# Patient Record
Sex: MALE | Race: WHITE | Employment: UNEMPLOYED | URBAN - NONMETROPOLITAN AREA
[De-identification: names, ages, dates, MRNs, and addresses within clinical notes are randomized per-mention and may not be internally consistent; named-entity substitution may affect disease eponyms.]

---

## 2022-02-14 ENCOUNTER — HOSPITAL ENCOUNTER (EMERGENCY)
Age: 62
Discharge: HOME | End: 2022-02-14
Attending: STUDENT IN AN ORGANIZED HEALTH CARE EDUCATION/TRAINING PROGRAM
Payer: OTHER GOVERNMENT

## 2022-02-14 VITALS
BODY MASS INDEX: 18.61 KG/M2 | OXYGEN SATURATION: 99 % | RESPIRATION RATE: 16 BRPM | WEIGHT: 145 LBS | TEMPERATURE: 97.8 F | SYSTOLIC BLOOD PRESSURE: 108 MMHG | HEART RATE: 108 BPM | HEIGHT: 74 IN | DIASTOLIC BLOOD PRESSURE: 90 MMHG

## 2022-02-14 DIAGNOSIS — Z71.1 FEARED CONDITION NOT DEMONSTRATED: Primary | ICD-10-CM

## 2022-02-14 PROCEDURE — 99285 EMERGENCY DEPT VISIT HI MDM: CPT

## 2022-02-14 RX ORDER — ASPIRIN 325 MG
325 TABLET ORAL DAILY
Status: ON HOLD | COMMUNITY
End: 2022-07-21 | Stop reason: HOSPADM

## 2022-02-14 RX ORDER — ATORVASTATIN CALCIUM 10 MG/1
10 TABLET, FILM COATED ORAL DAILY
Status: ON HOLD | COMMUNITY
End: 2022-07-21 | Stop reason: SDUPTHER

## 2022-02-14 NOTE — ED NOTES
Pt AVS reviewed, pt expressed understanding.  Pt d/c at this time     Christine Wellington RN  02/14/22 [id number]

## 2022-02-14 NOTE — ED PROVIDER NOTES
[facility] EMERGENCY DEPARTMENT      CHIEF COMPLAINT  Altered Mental Status (Pt [facility]. Pt appears A&Ox4, pt reports he has been sick with pneumonia recently. Denies any needs today. )     HISTORY OF PRESENT ILLNESS  Janis Hu is a 57 y.o. male  who presents [facility] the ED [facility] with concerns for altered mental status. Patient states that he was being seen there for a checkup after recent diagnosis of pneumonia. He is unsure why exactly he is in here. He denies any current complaints or concerns. Denies cough, chest pain or shortness of breath. Denies fevers. He denies any other complaints or concerns. No other complaints, modifying factors or associated symptoms. I have reviewed the following from the nursing documentation. Past Medical History:   Diagnosis Date    CVA (cerebral vascular accident) [facility])      History reviewed. No pertinent surgical history. History reviewed. No pertinent family history. Social History     Socioeconomic History    Marital status: Not on file     Spouse name: Not on file    Number of children: Not on file    Years of education: Not on file    Highest education level: Not on file   Occupational History    Not on file   Tobacco Use    Smoking status: Former Smoker     Packs/day: 1.00     Years: 54.00     Pack years: 54.00    Smokeless tobacco: Current User     Types: Chew, Snuff   Substance and Sexual Activity    Alcohol use:  Yes     Alcohol/week: 14.0 standard drinks     Types: 14 Cans of beer per week     Comment: 2 twisted teas a day    Drug use: Never    Sexual activity: Not on file   Other Topics Concern    Not on file   Social History Narrative    Not on file     Social Determinants of Health     Financial Resource Strain:     Difficulty of Paying Living Expenses: Not on file   Food Insecurity:     Worried About Running Out of Food in the Last Year: Not on file    Dennise of Food in the Last Year: Not on file Transportation Needs:     Lack of Transportation (Medical): Not on file    Lack of Transportation (Non-Medical): Not on file   Physical Activity:     Days of Exercise per Week: Not on file    Minutes of Exercise per Session: Not on file   Stress:     Feeling of Stress : Not on file   Social Connections:     Frequency of Communication with Friends and Family: Not on file    Frequency of Social Gatherings with Friends and Family: Not on file    Attends Christian Services: Not on file    Active Member of [address] [place] [address] [employer] or Organizations: Not on file    Attends Club or Organization Meetings: Not on file    Marital Status: Not on file   Intimate Partner Violence:     Fear of Current or Ex-Partner: Not on file    Emotionally Abused: Not on file    Physically Abused: Not on file    Sexually Abused: Not on file   Housing Stability:     Unable to Pay for Housing in the Last Year: Not on file    Number of [place] in the Last Year: Not on file    Unstable Housing in the Last Year: Not on file     No current facility-administered medications for this encounter. Current Outpatient Medications   Medication Sig Dispense Refill    aspirin 325 MG tablet Take 325 mg by mouth daily      atorvastatin (LIPITOR) 10 MG tablet Take 10 mg by mouth daily       No Known Allergies    REVIEW OF SYSTEMS  10 systems reviewed, pertinent positives per HPI otherwise noted to be negative. PHYSICAL EXAM  BP (!) 108/90   Pulse 108   Resp 16   Ht 6' 2\" (1.88 m)   Wt 145 lb (65.8 kg)   SpO2 99%   BMI 18.62 kg/m²    GENERAL APPEARANCE: Awake and alert. Cooperative. No acute distress. HENT: Normocephalic. Atraumatic. NECK: Supple. EYES: PERRL. EOM's grossly intact. HEART/CHEST: RRR. No murmurs. LUNGS: Respirations unlabored. CTAB. Good air exchange. Speaking comfortably in full sentences. ABDOMEN: No tenderness. Soft. Non-distended. No masses. No organomegaly. No guarding or rebound.    MUSCULOSKELETAL: No extremity edema. Compartments soft. No deformity. No tenderness in the extremities. All extremities neurovascularly intact. SKIN: Warm and dry. No acute rashes. NEUROLOGICAL: Alert and oriented x 4. CN's 2-12 intact. No gross facial drooping. Strength 5/5, sensation intact. Gait normal.  PSYCHIATRIC: Normal mood and affect. LABS  I have reviewed all labs for this visit. No results found for this visit on 02/14/22. RADIOLOGY  No orders to display     ED COURSE/MDM  Patient seen and evaluated. Old records reviewed. Labs and imaging reviewed and results discussed with patient. Patient is a 69-year-old male, who was sent from the [place] with concerns for possible altered mental status. Full HPI detailed above. Upon arrival in the ED, vitals remarkable for mild tachycardia, otherwise reassuring. Patient is resting comfortably and is in no acute distress. His neurologic exam is normal.  He is completely alert and oriented, can tell me today's date and who the president is. He states that he was sent here from the [place] to \"get checked out\" but he reports that they did not tell him anything more than that. He denies any chest pain, cough, shortness of breath, dysuria hematuria, or any other complaints or concerns aside from being hungry. Did attempt to obtain at least a urine here in the department however patient is requesting to leave and at this time he is alert and oriented and I do not feel that he is holdable. He does remain mildly tachycardic, heart rate was checked after he started walking out of the department I believe is secondary to him ambulating. He is otherwise afebrile and appears nontoxic. Is not altered at this point in time as stated above do not feel that I can hold him given the fact that he is fully alert and oriented and does have a ride home. He is given return precautions for the ED. He is comfortable and agreed with plan of care and was discharged.     During the patient's ED course, the patient was given:  Medications - No data to display     CLINICAL IMPRESSION  1. Feared condition not demonstrated        Blood pressure (!) 108/90, pulse 108, resp. rate 16, height 6' 2\" (1.88 m), weight 145 lb (65.8 kg), SpO2 99 %. DISPOSITION  Irbalta Arreola was discharged to home in good condition. Patient was given scripts for the following medications. I counseled patient how to take these medications. New Prescriptions    No medications on file       Follow-up with:  [facility]) Pre-Services  [phone]  Schedule an appointment as soon as possible for a visit   As needed    [id number] [facility] Emergency Department  [address] [place] [address],[address]  [phone]  Go to   If symptoms worsen      DISCLAIMER: This chart was created using Dragon dictation software. Efforts were made by me to ensure accuracy, however some errors may be present due to limitations of this technology and occasionally words are not transcribed correctly.        Abad Hernandez MD  02/14/22 [id number]

## 2022-06-29 ENCOUNTER — HOSPITAL ENCOUNTER (INPATIENT)
Age: 62
LOS: 23 days | Discharge: HOME HEALTH | DRG: 057 | End: 2022-07-22
Attending: STUDENT IN AN ORGANIZED HEALTH CARE EDUCATION/TRAINING PROGRAM | Admitting: STUDENT IN AN ORGANIZED HEALTH CARE EDUCATION/TRAINING PROGRAM
Payer: OTHER GOVERNMENT

## 2022-06-29 PROBLEM — I63.9 ACUTE CVA (CEREBROVASCULAR ACCIDENT) (HCC): Status: ACTIVE | Noted: 2022-06-29

## 2022-06-29 PROCEDURE — 1280000000 HC REHAB R&B

## 2022-06-29 PROCEDURE — 6370000000 HC RX 637 (ALT 250 FOR IP): Performed by: STUDENT IN AN ORGANIZED HEALTH CARE EDUCATION/TRAINING PROGRAM

## 2022-06-29 RX ORDER — TRAZODONE HYDROCHLORIDE 50 MG/1
50 TABLET ORAL NIGHTLY PRN
Status: DISCONTINUED | OUTPATIENT
Start: 2022-06-29 | End: 2022-07-22 | Stop reason: HOSPADM

## 2022-06-29 RX ORDER — ENOXAPARIN SODIUM 100 MG/ML
40 INJECTION SUBCUTANEOUS DAILY
Status: DISCONTINUED | OUTPATIENT
Start: 2022-06-30 | End: 2022-07-22 | Stop reason: HOSPADM

## 2022-06-29 RX ORDER — NICOTINE 21 MG/24HR
1 PATCH, TRANSDERMAL 24 HOURS TRANSDERMAL DAILY
Status: DISCONTINUED | OUTPATIENT
Start: 2022-06-29 | End: 2022-07-22 | Stop reason: HOSPADM

## 2022-06-29 RX ORDER — ACETAMINOPHEN 325 MG/1
650 TABLET ORAL EVERY 4 HOURS PRN
Status: DISCONTINUED | OUTPATIENT
Start: 2022-06-29 | End: 2022-07-22 | Stop reason: HOSPADM

## 2022-06-29 RX ORDER — ATORVASTATIN CALCIUM 10 MG/1
10 TABLET, FILM COATED ORAL NIGHTLY
Status: DISCONTINUED | OUTPATIENT
Start: 2022-06-29 | End: 2022-07-22 | Stop reason: HOSPADM

## 2022-06-29 RX ORDER — ASPIRIN 81 MG/1
81 TABLET, CHEWABLE ORAL DAILY
Status: DISCONTINUED | OUTPATIENT
Start: 2022-06-30 | End: 2022-07-22 | Stop reason: HOSPADM

## 2022-06-29 RX ORDER — POLYETHYLENE GLYCOL 3350 17 G/17G
17 POWDER, FOR SOLUTION ORAL DAILY PRN
Status: DISCONTINUED | OUTPATIENT
Start: 2022-06-29 | End: 2022-07-22 | Stop reason: HOSPADM

## 2022-06-29 RX ORDER — ACETAMINOPHEN 325 MG/1
650 TABLET ORAL EVERY 4 HOURS PRN
Status: DISCONTINUED | OUTPATIENT
Start: 2022-06-29 | End: 2022-06-29 | Stop reason: SDUPTHER

## 2022-06-29 RX ADMIN — ATORVASTATIN CALCIUM 10 MG: 10 TABLET, FILM COATED ORAL at 21:12

## 2022-06-29 ASSESSMENT — PAIN SCALES - GENERAL
PAINLEVEL_OUTOF10: 0

## 2022-06-29 NOTE — PROGRESS NOTES
NURSING ASSESSMENT:  [address]    [facility]     Rehab Dx/Hx: Acute CVA (cerebrovascular accident) [facility]) [I63.9]   :1960  QDY:[id number]  Date of Admit: 2022  Room #: 0170/0170-01    Subjective:   Patient admitted to [email or web address] from  via stretcher  Alert and oriented x4. Oriented to room and call light system. Oriented to rehab routine and therapy schedules. Informed about care conferences and ordering of meals with PCA. Drug / Medication Review:   Medications were reviewed by RN at time of admission  [x]  No potential or actual clinically significant medication issues were noted. []  Potential or actual clinically significant medication issues were found and MD was notified. (Specified below if applicable)   []  Allergy to medication   []  Drug interactions (drug/drug, drug/food, drug/disease interactions)   []  Duplicate drug   []  Omission (drug missing from prescribed regimen)   []  Non adherence   []  Adverse reaction   []  Wrong patient, drug, dose, route, time error   []  Ineffective drug therapy    Patient Mood Interview    Symptom Presence                 Symptom Frequency  0. No (enter 0 in column 2)                 0. Never or 1 day  1. Yes (enter 0-3 in column 2)     1. 2-6 days (several days)  9. No response (leave column 2 blank    2. 7-11 days (half or more days)                                 3. 12-14 days (nearly everyday 1. Symptom Presence 2. Symptom Frequency               Enter scores in boxes     A. Little interest or pleasure in doing things   0 0   B. Feeling down, depressed, or hopeless   0 0   If either A or B is coded 2 or 3, CONTINUE asking the questions below. If not, END the interview. Serafin Newman falling or staying asleep, or sleeping too much       D. Feeling tired or having little energy       E. Poor appetite or overeating       F.  Feelign bad about yourself - or that you are a failure or have let yourself or your family down     G. Trouble concentrating on things, such as reading the newspaper or watching television     H. Moving or speaking so slowly that other people could have noticed. Or the opposite- being so fidgety or restless that you have been moving around a lot more than usual.      I. Thoughts that you would be better off dead, or of hurting yourself in some way. Total Severity: Add scores for all frequency responses in column 2       Social isolation (from [place])     How often do you feel lonely or isolated from those around you? [x] 0. Never  [] 1. Rarely  [] 2. Sometimes  [] 3. Often  [] 4. Always  [] 8. Patient unable to respond. 4 Eyes Skin Assessment   The patient is being assessed for: Admission     I agree that 2 RN's have performed a thorough Head to Toe Skin Assessment on the patient. ALL assessment sites listed below have been assessed. Areas assessed by both nurses:   [x]   Head, Face, and Ears   [x]   Shoulders, Back, and Chest, Abdomen  [x]   Arms, Elbows, and Hands   [x]   Coccyx, Sacrum, and Ischium  [x]   Legs, Feet, and Heel     Does the Patient have Skin Breakdown?   No         Tom Prevention initiated:  Yes   Wound Care Orders initiated:   Not Applicable      [facility] nurse consulted for Pressure Injury (Stage 3,4, Unstageable, DTI, NWPT, Complex wounds)and New or Established Ostomies:   Not Applicable    Primary Nurse eSignature: Killian Pisano  Co-signer eSignature:  Catherine Fang RN

## 2022-06-29 NOTE — LETTER
[address] [address]  Phone: [phone]             July 5, 2022    Patient:    Date of Birth:    Date of Visit: 6/29/2022       To Whom It May Concern:    Bernadette Quintero has an immediate family member with a debilitating condition in our facility beginning 6/29/2022 and is still admitted at this time. .      Sincerely,       Rob Isabel RN         Signature:__________________________________

## 2022-06-29 NOTE — PROGRESS NOTES
ARU PATIENT TREATMENT PLAN  [facility], [address]   [phone]    Indioyvonne Arminda    : 1960  Acct #: [de-identified]  MRN: [id number]   PHYSICIAN:  Catarino Marie MD  Primary Problem    Patient Active Problem List   Diagnosis    Acute CVA (cerebrovascular accident) ([address]    Moderate malnutrition ([facility]       Rehabilitation Diagnosis:     Acute CVA (cerebrovascular accident) ([facility] [I63.9]       ADMIT DATE:2022    Patient Goals:\"to get back to normal\"    Admitting Impairments: Pt. Admitted s/p CVA resulting in Decreased functional mobility ; Decreased endurance;Decreased coordination;Decreased posture;Decreased ADL status; Decreased sensation;Decreased balance;Decreased strength;Decreased safe awareness;Decreased high-level IADLs;Decreased cognition;Decreased fine motor control  Barriers: endurance, weakness  Participation: Good     CARE PLAN     NURSING:  [place] while on this unit will:     [x] Be continent of bowel and bladder     [x] Have an adequate number of bowel movements  [] Urinate with no urinary retention >300ml in bladder  [] Complete bladder protocol with george removal  [] Maintain O2 SATs at ___%  [] Have pain managed while on ARU       [] Be pain free by discharge   [x] Have no skin breakdown while on ARU  [] Have improved skin integrity via wound measurements  [] Have no signs/symptoms of infection at the wound site  [x] Be free from injury during hospitalization   [] Complete education with patient/family with understanding demonstrated for:  [] Adjustment   [x] Other:   Nursing interventions may include bowel/bladder training, education for medical assistive devices, medication education, O2 saturation management, energy conservation, stress management techniques, fall prevention, alarms protocol, seating and positioning, skin/wound care, pressure relief instruction,dressing changes,  infection protection, DVT prophylaxis, and/or assistance with in room safety with transfers to bed, toilet, wheelchair, shower as well as bathroom activities and hygiene. Patient/caregiver education for:   [x] Disease/sustained injury/management      [x] Medication Use   [] Surgical intervention   [x] Safety   [x] Body mechanics and or joint protection   [x] Health maintenance         PHYSICAL THERAPY:  Goals:                  Short Term Goals  Time Frame for Short term goals: 10 days (7/9/22)  Short term goal 1: Pt will perform bed mobility with min A  Short term goal 2: Pt will perform transfers with min A  Short term goal 3: Pt will ambulate 22' with quad cane and mod A  Short term goal 4: Pt will negotiate 4 steps with HR and mod A            Long Term Goals  Time Frame for Long term goals : 21 days (7/20/22)  Long term goal 1: Pt will perform bed mobility mod I  Long term goal 2: Pt will perform transfers with supervision  Long term goal 3: Pt will ambulate 48' with quad cane and CGA  Long term goal 4: Pt will propel wheelchair 150' mod I  Long term goal 5: Pt will negotiate 4 steps with HR and min A  These goals were reviewed with this patient at the time of assessment and Megan Mena is in agreement.      Plan of Care: Pt to be seen 5 out of 7 days per week, 60 mins (exact) per day for 21 days (exact)                   Current Treatment Recommendations: Strengthening,ROM,Balance training,Functional mobility training,Transfer training,Endurance training,Wheelchair mobility training,Gait training,Stair training,Neuromuscular re-education,Manual Therapy - Soft Tissue Mobilization,Patient/Caregiver education & training,Safety education & training,Home exercise program,Equipment evaluation, education, & procurement,Modalities,Therapeutic activities,ADL/Self-care training,IADL training      OCCUPATIONAL THERAPY:  Goals:             Short Term Goals  Time Frame for Short term goals: within 7 days by 7/6/22  Short Term Goal 1: Pt will perform toilet transfer with Mod A and LRAD  Short Term Goal 2: Pt will perform LB dressing with Min A and AE PRN  Short Term Goal 3: Pt will perform UB dressing with Min A and appropriate katlyn techniques  Short Term Goal 4: Pt will perform functional dynamic balance activity in stance for >5 minutes with Min A and LRAD  Short Term Goal 5: Pt will perform 2-3 grooming tasks with Min A for during tasks standing vs sitting with LRAD and Min A for balance :  Long Term Goals  Time Frame for Long term goals : within 21 days by  Long Term Goal 1: Pt will perform TB dressing Mod I  Long Term Goal 2: Pt will perfrom TB bathing Mod I and AE PRN  Long Term Goal 3: Pt will perform simple IADL task in stance with LRAD for >7 minutes with supervision  Long Term Goal 4: Pt will self feed with R hand with Min A  Long Term Goal 5: Add goal after objective R vs L hand assessment completed with  strength, box and blocks, etc. :    These goals were reviewed with this patient at the time of assessment and Rosario Medina is in agreement    Plan of Care:  Pt to be seen 5 out of 7 days per week, 60   mins (exact) per day for 21 days (exact)  Current Treatment Recommendations: Strengthening,Balance training,Functional mobility training,Endurance training,Neuromuscular re-education,Positioning,Equipment evaluation, education, & procurement,Modalities,Patient/Caregiver education & training,Safety education & training,Self-Care / ADL,Home management training,Cognitive/Perceptual training,Coordination training,Sensory integraion      SPEECH THERAPY:   Goals:    Speech/lang/cog goals:             Short-term Goals  Timeframe for Short-term Goals: 14 days (7/13/22)  Goal 4: The pt will complete laryngeal/pharyngeal strengthening exercises in 10/10 trials, min cues. Short-term Goals  Timeframe for Short-term Goals: 14 days (7/13/22)  Goal 1: The pt will complete graded recall tasks with 90% accuracy, no cues.   Goal 2: The pt will complete executive compensatory speech strategies during structured speech tasks with 70% accuracy, mod cues. Timeframe for Short-term Goals: 14 days (7/13/22)  These goals were reviewed with this patient at the time of assessment and Kaur Vasquez is in agreement    Plan of Care: Pt to be seen 5 out of 7 days per week, 60 mins (exact) per day for 21 days (exact)             Dysphagia: HOB 90* and 30\" after meals; small bites/sips; alternate solids/liquids every 3-5 bites; oral care after every meal           Speech/Language/Cognition: Compensatory strategy training and carryover, recall/STM, problem solving, reasoning, exec function, thought organization, attention. CASE MANAGEMENT:  Goals:   Assist patient/family with discharge planning, patient/family counseling,   and coordination with insurance during ARU stay.     QIM / IRF MATTHEW SCORES:  ITEM CURRENT SCORE GOAL   Eating CARE Score: 3 Discharge Goal: Independent   Oral Hygiene CARE Score: 1 Discharge Goal: Independent   Toileting Hygiene CARE Score: 2 Discharge Goal: Independent   Shower/Bathe Self CARE Score: 1 Discharge Goal: Independent   Upper Body Dressing CARE Score: 2 Discharge Goal: Independent   Lower Body Dressing CARE Score: 2 Discharge Goal: Independent   On/Off Footwear CARE Score: 2 Discharge Goal: Independent   Roll Left & Right CARE Score: 3 Discharge Goal: Independent   Sit to Lying  CARE Score: 3 Discharge Goal: Independent   Lying to Sitting EOB CARE Score: 2 Discharge Goal: Independent   Sit to Stand CARE Score: 3 Discharge Goal: Supervision or touching assistance   Chair/Bed to Chair Transfer CARE Score: 3 Discharge Goal: Supervision or touching assistance   Toilet Transfer CARE Score: 2 Discharge Goal: Independent   Car Transfer CARE Score: 1 Discharge Goal: Supervision or touching assistance   Walk 10 Feet CARE Score: 88 Discharge Goal: Supervision or touching assistance   Walk 50 Feet, 2 Turns CARE Score: 88 Discharge Goal: Supervision or touching assistance   Walk 150 Feet CARE Score: 88 Discharge Goal: Supervision or touching assistance   Walk 10 Feet, Uneven Surface CARE Score: 88 Discharge Goal: Supervision or touching assistance   1 Step (Curb) CARE Score: 88 Discharge Goal: Supervision or touching assistance   4 Steps CARE Score: 88 Discharge Goal: Supervision or touching assistance   12 Steps CARE Score: 88 Discharge Goal: Supervision or touching assistance    Object CARE Score: 88 Discharge Goal: Supervision or touching assistance   Wheel 50 feet, 2 turns       Wheel 150 Feet              Rene [place] will be seen a minimum of 3 hours of therapy per day, a minimum of 5 out of 7 days per week. [] In this rare instance due to the nature of this patient's medical involvement, this patient will be seen 15 hours per week (900 minutes within a 7 day period). Treatments may include therapeutic exercises, gait training, neuromuscular re-ed, transfer training, community reintegration, bed mobility, w/c mobility and training, self care, home mgmt, cognitive training, energy conservation,dysphagia tx, speech/language/communication therapy, group therapy, and patient/family education. In addition, dietician/nutritionist may monitor calorie count as well as intake and collaboratively work with SLP on dietary upgrades. Neuropsychology/Psychology may evaluate and provide necessary support.     Medical issues being managed closely and that require 24 hour availability of a physician:   [x] Swallowing Precautions  [x] Bowel/Bladder Fx  [] Weight bearing precautions   [] Wound Care    [] Pain Mgmt   [x] Infection Protection   [x] DVT Prophylaxis   [x] Fall Precautions  [x] Fluid/Electrolyte/Nutrition Balance   [x] Voice Protection   [] Respiratory  [] Other:    Medical Prognosis: [x] Good  [] Fair    [] Guarded   Total expected IRF days 21  Anticipated discharge destination:    [] Home Independently   [] Home Modified Independent

## 2022-06-30 PROBLEM — E44.0 MODERATE MALNUTRITION (HCC): Status: ACTIVE | Noted: 2022-06-30

## 2022-06-30 LAB
ANION GAP SERPL CALCULATED.3IONS-SCNC: 8 MMOL/L (ref 3–16)
BASOPHILS ABSOLUTE: 0.1 K/UL (ref 0–0.2)
BASOPHILS RELATIVE PERCENT: 1.1 %
BUN BLDV-MCNC: 13 MG/DL (ref 7–20)
CALCIUM SERPL-MCNC: 9.5 MG/DL (ref 8.3–10.6)
CHLORIDE BLD-SCNC: 100 MMOL/L (ref 99–110)
CO2: 27 MMOL/L (ref 21–32)
CREAT SERPL-MCNC: 0.8 MG/DL (ref 0.8–1.3)
EOSINOPHILS ABSOLUTE: 0.1 K/UL (ref 0–0.6)
EOSINOPHILS RELATIVE PERCENT: 1.3 %
GFR AFRICAN AMERICAN: >60
GFR NON-AFRICAN AMERICAN: >60
GLUCOSE BLD-MCNC: 111 MG/DL (ref 70–99)
HCT VFR BLD CALC: 43.9 % (ref 40.5–52.5)
HEMATOLOGY PATH CONSULT: NORMAL
HEMATOLOGY PATH CONSULT: YES
HEMOGLOBIN: 14.9 G/DL (ref 13.5–17.5)
LYMPHOCYTES ABSOLUTE: 1.2 K/UL (ref 1–5.1)
LYMPHOCYTES RELATIVE PERCENT: 22.3 %
MCH RBC QN AUTO: 37.7 PG (ref 26–34)
MCHC RBC AUTO-ENTMCNC: 33.8 G/DL (ref 31–36)
MCV RBC AUTO: 111.3 FL (ref 80–100)
MONOCYTES ABSOLUTE: 0.5 K/UL (ref 0–1.3)
MONOCYTES RELATIVE PERCENT: 9.5 %
NEUTROPHILS ABSOLUTE: 3.4 K/UL (ref 1.7–7.7)
NEUTROPHILS RELATIVE PERCENT: 65.8 %
PDW BLD-RTO: 13.7 % (ref 12.4–15.4)
PLATELET # BLD: 173 K/UL (ref 135–450)
PMV BLD AUTO: 7.6 FL (ref 5–10.5)
POTASSIUM REFLEX MAGNESIUM: 4 MMOL/L (ref 3.5–5.1)
RBC # BLD: 3.95 M/UL (ref 4.2–5.9)
SLIDE REVIEW: ABNORMAL
SODIUM BLD-SCNC: 135 MMOL/L (ref 136–145)
WBC # BLD: 5.2 K/UL (ref 4–11)

## 2022-06-30 PROCEDURE — 97116 GAIT TRAINING THERAPY: CPT

## 2022-06-30 PROCEDURE — 97167 OT EVAL HIGH COMPLEX 60 MIN: CPT

## 2022-06-30 PROCEDURE — 97530 THERAPEUTIC ACTIVITIES: CPT

## 2022-06-30 PROCEDURE — 92610 EVALUATE SWALLOWING FUNCTION: CPT

## 2022-06-30 PROCEDURE — 97162 PT EVAL MOD COMPLEX 30 MIN: CPT

## 2022-06-30 PROCEDURE — 80048 BASIC METABOLIC PNL TOTAL CA: CPT

## 2022-06-30 PROCEDURE — 85025 COMPLETE CBC W/AUTO DIFF WBC: CPT

## 2022-06-30 PROCEDURE — 92523 SPEECH SOUND LANG COMPREHEN: CPT

## 2022-06-30 PROCEDURE — 97112 NEUROMUSCULAR REEDUCATION: CPT

## 2022-06-30 PROCEDURE — 1280000000 HC REHAB R&B

## 2022-06-30 PROCEDURE — 6370000000 HC RX 637 (ALT 250 FOR IP): Performed by: STUDENT IN AN ORGANIZED HEALTH CARE EDUCATION/TRAINING PROGRAM

## 2022-06-30 PROCEDURE — 97535 SELF CARE MNGMENT TRAINING: CPT

## 2022-06-30 PROCEDURE — 6360000002 HC RX W HCPCS: Performed by: STUDENT IN AN ORGANIZED HEALTH CARE EDUCATION/TRAINING PROGRAM

## 2022-06-30 RX ADMIN — ATORVASTATIN CALCIUM 10 MG: 10 TABLET, FILM COATED ORAL at 21:00

## 2022-06-30 RX ADMIN — ENOXAPARIN SODIUM 40 MG: 100 INJECTION SUBCUTANEOUS at 08:14

## 2022-06-30 RX ADMIN — TRAZODONE HYDROCHLORIDE 50 MG: 50 TABLET ORAL at 21:00

## 2022-06-30 RX ADMIN — ASPIRIN 81 MG: 81 TABLET, CHEWABLE ORAL at 08:15

## 2022-06-30 ASSESSMENT — PAIN SCALES - GENERAL
PAINLEVEL_OUTOF10: 0
PAINLEVEL_OUTOF10: 1
PAINLEVEL_OUTOF10: 0

## 2022-06-30 NOTE — PROGRESS NOTES
Physical Therapy  Facility/Department: [facility]  Rehabilitation Physical Therapy Initial Assessment    NAME: Mikie Mcduffie  : 1960 (63 y.o.)  MRN: [id number]  CODE STATUS: Full Code    Date of Service: 22      Past Medical History:   Diagnosis Date    CVA (cerebral vascular accident) ([address]      No past surgical history on file. Chart Reviewed: Yes  Patient assessed for rehabilitation services?: Yes  Family / Caregiver Present: No  Referring Practitioner: Luigi Vasquez MD  Referral Date : 22  Diagnosis: acute CVA  Other (Comment): mildly impulsive  General Comment  Comments: Pt supine in bed upon arrival.    Restrictions:  Restrictions/Precautions: Fall Risk  Position Activity Restriction  Other position/activity restrictions: R sided weakness     SUBJECTIVE  Subjective: Pt agreeable to therapy. Eager to get up and walk. Denies pain.     Prior Level of Function:  Social/Functional History  Lives With: Family (brother)  Type of Home: House  Home Layout: Two level,Able to Live on Main level with bedroom/bathroom  Home Access: Level entry (through rear)  Bathroom Shower/Tub: Tub/Shower unit  Bathroom Toilet: Standard  Home Equipment: Cane,Walker, rolling,Wheelchair-manual  Has the patient had two or more falls in the past year or any fall with injury in the past year?: Yes (pt reports more than 5 falls in the last year)  ADL Assistance: Independent  Homemaking Assistance: Independent  Homemaking Responsibilities: Yes  Meal Prep Responsibility: Primary  Laundry Responsibility: Primary  Cleaning Responsibility: Primary  Shopping Responsibility: Primary  Ambulation Assistance: Independent  Transfer Assistance: Independent  Active : Yes  Occupation: Retired  Type of Occupation: adrian     OBJECTIVE  Vision  Vision: Impaired  Vision Exceptions: Wears glasses for reading    Cognition  Overall Cognitive Status: Exceptions  Arousal/Alertness: Appropriate responses to stimuli  Following Commands: Follows one step commands with repetition; Follows one step commands consistently  Attention Span: Attends with cues to redirect  Safety Judgement: Decreased awareness of need for assistance;Decreased awareness of need for safety  Insights: Decreased awareness of deficits  Initiation: Requires cues for some  Sequencing: Requires cues for some    ROM  PROM RLE (degrees)  RLE PROM: WFL  AROM LLE (degrees)  LLE AROM : WFL    Strength  Strength RLE  Strength RLE: Exception  Comment: R hip flex 2/5, hip abd 2/5, knee ext 2/5, ankle PF/DF 3/5  Strength LLE  Strength LLE: WFL    Sensation  Overall Sensation Status: Impaired  Additional Comments: reports numbness on R side of body including UE and LE    Functional Mobility  Bed mobility  Rolling to Left: Minimal assistance  Rolling to Right: Moderate assistance  Supine to Sit: Maximum assistance  Sit to Supine: Minimal assistance  Scooting: Minimal assistance  Transfers  Sit to Stand: Moderate Assistance  Stand to sit: Moderate Assistance  Bed to Chair: Moderate assistance (to L with cues for safety)  Stand Pivot Transfers:  Moderate Assistance  Car Transfer: Dependent/Total (attempted car transfer, however pt unable to clear bottom off wheelchair to transfer into car)  Comment: sit-stand from EOB and wheelchair with mod A and cues for hand placement; pt able to stand from w/c to katlyn walker with mod A; sit-stand from w/c to parallel bars with min A  Balance  Sitting - Static: Fair  Sitting - Dynamic: Fair  Standing - Static: Poor;+  Standing - Dynamic: Poor  Comments: unsafe to attempt picking up object from floor    Environmental Mobility  Ambulation  Surface: level tile  Device: Parallel Bars  Assistance: Minimal assistance  Quality of Gait: step to gait pattern, decresed foot clearance on R with foot dragging ~25% of the time, improved with verbal cues; unsteady at times; highly dependent on use of parallel bar for support  Distance: 10' and 20'  Comments: unsafe to attempt ambulation on uneven surface  More Ambulation?: No  Stairs/Curb  Stairs?: No (unsafe to attempt at this time)  Wheelchair Activities  Wheelchair Size: 20\"  Wheelchair Type: Standard  Wheelchair Cushion: None  Level of Assistance for pressure relief activities: Stand by assistance  Wheelchair Parts Management: Yes  Left Brakes Level of Assistance: Dependent/Total  Right Brakes Level of Assistance: Dependent/Total  Propulsion: No       ASSESSMENT  Vitals  Heart Rate: 75  BP: 118/79  MAP (Calculated): 92  SpO2: 96 %    Activity Tolerance  Activity Tolerance: Patient tolerated evaluation without incident    Assessment  Assessment: Pt is a 59 y/o male who presents to rehab with acute CVA. Pt was independent prior to admit living with brother in 2 story house. Pt currently requires max A for bed mobility and mod A for transfers. Pt also demonstrates decreased safety awareness and some insight into current deficits. Pt would benefit from continued skilled PT to address current limtiations. Treatment Diagnosis: impaired functional mobility  Therapy Prognosis: Good  Decision Making: Medium Complexity  Discharge Recommendations: 24 hour supervision or assist;Patient would benefit from continued therapy after discharge  PT Equipment Recommendations  Equipment Needed:  (TBD pending progress)    CLINICAL IMPRESSION   Pt is a 59 y/o male who presents to rehab with acute CVA. Pt was independent prior to admit living with brother in 2 story house. Pt currently requires max A for bed mobility and mod A for transfers. Pt also demonstrates decreased safety awareness and some insight into current deficits. Pt would benefit from continued skilled PT to address current limtiations.     GOALS  Patient Goals   Patient goals : \"to be able to walk again and use my R arm\"  Short Term Goals  Time Frame for Short term goals: 10 days (7/9/22)  Short term goal 1: Pt will perform bed mobility with min A  Short term goal 2: Pt will perform transfers with min A  Short term goal 3: Pt will ambulate 22' with quad cane and mod A  Short term goal 4: Pt will negotiate 4 steps with HR and mod A  Long Term Goals  Time Frame for Long term goals : 21 days (7/20/22)  Long term goal 1: Pt will perform bed mobility mod I  Long term goal 2: Pt will perform transfers with supervision  Long term goal 3: Pt will ambulate 48' with quad cane and CGA  Long term goal 4: Pt will propel wheelchair 150' mod I  Long term goal 5: Pt will negotiate 4 steps with HR and min A    PLAN OF CARE  Frequency: 1-2 treatment sessions per day, 5-7 days per week  Plan  Plan:  minutes of therapy at least 5 out of 7 days a week  Plan weeks: 21 days  Current Treatment Recommendations: Strengthening;ROM;Balance training;Functional mobility training;Transfer training; Endurance training; Wheelchair mobility training;Gait training;Stair training;Neuromuscular re-education;Manual Therapy - Soft Tissue Mobilization;Patient/Caregiver education & training; Safety education & training;Home exercise program;Equipment evaluation, education, & procurement; Modalities; Therapeutic activities  Safety Devices  Type of Devices: All darrel prominences offloaded; All fall risk precautions in place;Call light within reach; Chair alarm in place;Gait belt;Patient at risk for falls; Left in chair;Nurse notified    EDUCATION  Education  Education Given To: Patient  Education Provided: Role of Therapy; Safety;Equipment; Mobility Training; Fall Prevention Strategies;Transfer Training;Plan of Care  Education Provided Comments: Educated on safety with transfers and positioning of R leg  Education Method: Demonstration;Verbal  Barriers to Learning: Cognition  Education Outcome: Verbalized understanding;Continued education needed    ELOS:   Plan weeks: 21 days    Therapy Time   Individual Concurrent Group Co-treatment   Time In 0900         Time Out 1000         Minutes 60           Timed Code Treatment Minutes: [address] [facility], [address] [place] [address], 06/30/22 at 10:29 AM

## 2022-06-30 NOTE — PROGRESS NOTES
Speech Language Pathology  Facility/Department: [facility]  Initial Speech/Language/Cognitive Assessment    NAME: Le Hilliard  : 1960   MRN: [id number]  ADMISSION DATE: 2022  ADMITTING DIAGNOSIS: has Acute CVA (cerebrovascular accident) [facility]) on their problem list.  DATE ONSET: Pt admitted to [address] [place] [address] on 22    Date of Eval: 2022   Evaluating Therapist: GEN Trevizo    RECENT RESULTS MRI Head WO Contrast (22): IMPRESSION:   1.  Acute infarct involving the left corona radiata and posterior left putamen. No evidence of hemorrhagic transformation or significant mass effect. 2.  Small focal subacute infarct in the right parietal.   3.  Multiple additional scattered remote-appearing infarcts within the bilateral MCA and left PCA distributions. 4.  Moderate chronic small vessel ischemic disease with diffuse cerebral and cerebellar volume loss. Primary Complaint: Pt s/p L MCA CVA, transferred to [address] [place] [address] from [facility]. Pain:  Pain Assessment  Pain Assessment: 0-10  Pain Level: 0    Assessment:  Cognitive Diagnosis: Mild cognitive deficit / reduced insight into deficits, poor safety awareness  Speech Diagnosis: Moderate-severe dysarthria   Pt pleasant and cooperative throughout evaluation, seen upright in recliner. Pt presents with right-sided oral/facial weakness and numbness per pt, moderate-severe dysarthria with pt's responses >1-2 words. When discussing pt's speech, he stated \"well I'm a [place] boy\", but later on did state that his speech has changed/is worse than baseline. He also has multiple teeth missing. The pt reported that he independently managed all of his own household tasks PTA, shared some duties with his brother (I.e. yard work). The pt was administered the [address] ([address] [facility] [facility]) Examination this date to assess cognition.   The pt scored a 26/28 with a score of 25 or greater considered WNL per the SLUMS (*pt has less than a high school education per pt). The pt's score was also adjusted from 30 to 28 d/t drawing aspect for clock omitted (pt unable to write/utilize dominant hand). See pt's scores on each subtest below:    [address] Status (SLUMS) Examination   Section / subtest   Score Comments   Orientation   3/3    Short-term recall   3/5    Calculations   3/3    Naming   3/3    Attention: number repetition   2/2    Visuospatial tasks: Clock drawing and shape identification   4/4 Drawing aspect of clock omitted (*pt unable to write/use dominant hand); target time completed verbally with pt     Auditory comprehension 8/8     Total score:  26/28  Pt has less than a high school education       The pt demonstrated difficulty with short-term recall only. He independently recalled 3/5 words after <5 minute delay. The pt's strengths included orientation, auditory comprehension, abstract language, and visuo-spatial tasks. Per PT/OT, pt demonstrated reduced insight into severity of deficits and was impulsive at times during their evaluations. Pt would benefit from continued ST during acute stay to address high-level cognition/safety awareness, as well as, pt's significant dysarthria. Recommendations:  Requires SLP Intervention: Yes  Duration of Treatment: 5x/week, 21 days    Plan:   Goals:  Short-term Goals  Timeframe for Short-term Goals: 14 days (7/13/22)  Goal 1: The pt will complete graded recall tasks with 90% accuracy, no cues. Goal 2: The pt will complete executive function tasks (i.e. planning, deductive reasoning, inferential, functional organization tasks) with 90% accuracy no cues. Goal 3: The pt will complete high-level problem-solving tasks (*particularly related to safety scenarios) with 95% accuracy, no cues. Goal 4: The pt will complete articulatory agility tasks with 70% accuracy, mod cues. Goal 5:  The pt will effectively use compensatory speech strategies during structured speech tasks with 70% accuracy, mod cues. Long-term Goals  Timeframe for Long-term Goals: 21 days (7/20/22)  Goal 1: The pt will effectively use reviewed compensatory strategies for improved safety and independence upon d/c. Goal 2: The pt will effectively use reviewed compensatory speech strategies for improved speech intelligibility in conversation with unfamiliar listener. Patient/family involved in developing goals and treatment plan: Yes    Subjective:  Social/Functional History  Lives With: Family (Brother)  Active : Yes  Occupation: Retired  Type of Occupation: , adrian   Leisure & Hobbies: hunting, fishing  Additional Comments: pt reported that he did not take any medications PTA. Pt managed his own finances, cleaning, cooking, etc. independently per pt. Vision  Vision Exceptions: Wears glasses for reading  Hearing  Hearing: Within functional limits       Objective: Auditory Comprehension  Comprehension: Exceptions  Basic Questions: [facility]  One Step Commands: [facility]    Reading Comprehension  Reading Status:  (Did not formally assess this date)    Expression  Primary Mode of Expression: Verbal    Verbal Expression  Verbal Expression: Within functional limits    Written Expression  Dominant Hand: Right (RUE flaccid; writing tasks deferred)  Written Expression: Exceptions to [facility]    Pragmatics/Social Functioning  Pragmatics: Within functional limits    Cognition:      Orientation  Overall Orientation Status: Within Functional Limits  Attention  Attention: Within Functional Limits  Memory  Memory: Exceptions to [facility]  Short-term Memory: Mild  Problem Solving  Problem Solving: Exceptions to [facility]  Verbal Reasoning Skills:  Moderate (Reduced safety awareness per PT/OT)  Executive Function Skills:  (To be further assessed)  Managing Finances:  (To be assessed)  Managing Medications:  (To be assessed)  Numeric Reasoning  Numeric Reasoning: Within Functional Limits  Abstract Reasoning  Abstract Reasoning: Within Functional Limits  Safety/Judgment  Safety/Judgment: Exceptions to [facility]  Insight: Moderate    Impulsive: Moderate      Prognosis:  Speech Therapy Prognosis  Prognosis: Good  Prognosis Considerations: Age; Family/Community Support;Severity of Impairments;Medical Diagnosis; Potential;Participation Level;Previous Level of Function  Individuals consulted  Consulted and agree with results and recommendations: Patient    Education:  Patient Education:Pt educated on reason for referral, role of ST, assessment results and recommendations. Patient Education Response: Verbalizes understanding  Safety Devices in place: Yes  Type of devices: Left in chair;Call light within reach; Chair alarm in place    Therapy Time:   Individual Concurrent Group Co-treatment   Time In 1030         Time Out 1115         Minutes 45           eval speech sound rodolfo Palacio M.S. [facility]  Speech-language pathologist  [phone]

## 2022-06-30 NOTE — CARE COORDINATION
Case Management ARU Admission [address]     Rehab Dx/Hx: Acute CVA (cerebrovascular accident) [facility]) [I63.9]   APF:[birth year]  XPW:[id number]  Date of Admit: 6/29/2022  Room #: 6930/0399-41       Objective:  met with the patient to complete initial assessment and review the role of Case Management while on the ARU. Patient educated on team conferences. Discussed family training with the patient/family on how it is encouraged on the unit. Order for \"discharge planning\" has been addressed. Family Present: No   Primary :    Health Care Decision Maker: Brother   Current PCP:  Jp Stacy       Admit date:  6/29/2022   Insurance: VACCN/VA CCN OPTUM   Precert required for SNF:  [] No       [x]Yes   3 Night stay required: [x] No       []Yes   Admitting diagnosis: Acute CVA (cerebrovascular accident) ([address] [I63.9]       Current home situation: Independent PLOF. Lives with brother in a 2 level home with a level entry into home from the back. DME at home: [x] Walker>rolling     [x] Cane       [] RTS        [] BSC      [] Shower Chair        [] O2       [] HHN     [] CPAP       [] BiPap      [] Hospital Bed       [x] W/C>manual        [] Other:    Medical concerns requiring training: no   Medication concerns:  Require financial assistance with meds? [x] No       [] If yes, please explain:   [x] No       []Yes       Services prior to admission: none   Preference for [facility] or OP Therapy: [] Home health       [] Outpatient       [x] No preference   Patient's goal(s): Return to PLOF   Working or volunteering PTA?  unemployed       Transportation needs: brother   Has lack of transportation kept you from medical appointments, meetings, work, or ADL's? [] Yes, it has kept me from medical appointments or from getting my meds  [] Yes, It has kept me from non-medical meetings, appointments, work, or getting things I need  [x] No  [] Pt unable to

## 2022-06-30 NOTE — PLAN OF CARE
Bedside swallow evaluation completed this date. Anshu Watson M.S. [address] [facility]  Speech-language pathologist  [id number]      Speech/lang/cog evaluation completed this date.     Anshu Watson M.S. [address] [facility]  Speech-language pathologist  [phone]

## 2022-06-30 NOTE — CONSULTS
Comprehensive Nutrition Assessment    Type and Reason for Visit:  Consult    Nutrition Recommendations/Plan:   1. Continue soft and bite sized diet, mildly thick liquids  2. Diet texture/liquid consistency per SLP  3. Continue Magic cups BID  4. Encourage PO intakes  5. Monitor nutrition adequacy, pertinent labs, bowel habits, wt changes, and clinical progress     Malnutrition Assessment:  Malnutrition Status: Moderate malnutrition (06/30/22 6429)    Context:  Acute Illness     Findings of the 6 clinical characteristics of malnutrition:  Weight Loss:  Unable to assess     Body Fat Loss:  Mild body fat loss Orbital   Muscle Mass Loss:  Mild muscle mass loss Temples (temporalis),Clavicles (pectoralis & deltoids)    Nutrition Assessment:    Consulted for ONS. Pt newly admitted to ARU s/p acute CVA. Pt reports good appetite, eating dinner during visit. PO intakes % of meals and ONS this admission. Pt endorses weight loss over the past 6mo-1yr, unsure of exact timeframe. States UBW used to be 165#. CBW of 145#. Unable to confirm d/t no wt hx per EMR. Noted mild muscle and fat loss. Encouraged PO intakes and magic cups added BID. Pt voiced understanding, favorable to ONS. Will continue to monitor. Nutrition Related Findings:    Na 135. No BM yet. Wound Type: None       Current Nutrition Intake & Therapies:    Average Meal Intake: %  Average Supplements Intake: %  ADULT DIET; Dysphagia - Soft and Bite Sized; Mildly Thick ([place])  ADULT ORAL NUTRITION SUPPLEMENT; Lunch, Dinner; Frozen Oral Supplement    Anthropometric Measures:  Height: 6' 2\" (188 cm)  Ideal Body Weight (IBW): 190 lbs (86 kg)       Current Body Weight: 145 lb (65.8 kg), 76.3 % IBW. Weight Source: Bed Scale  Current BMI (kg/m2): 18.6  Usual Body Weight: 165 lb (74.8 kg) (per pt)  % Weight Change (Calculated): -12.1                    BMI Categories: Normal Weight (BMI 18.5-24. 9)    Estimated Daily Nutrient Needs:  Energy Requirements Based On: Kcal/kg (25-30)  Weight Used for Energy Requirements: Current  Energy (kcal/day): 2962-9785  Weight Used for Protein Requirements: Current (1.2-1.4)  Protein (g/day): 79-92  Method Used for Fluid Requirements: 1 ml/kcal  Fluid (ml/day): 7033-5981    Nutrition Diagnosis:   · Inadequate oral intake related to inadequate protein-energy intake as evidenced by poor intake prior to [place] Controls loss,Criteria as identified in malnutrition assessment    Nutrition Interventions:   Food and/or Nutrient Delivery: Continue Current Diet,Continue Oral Nutrition Supplement  Nutrition Education/Counseling: No recommendation at this time  Coordination of Nutrition Care: Continue to monitor while inpatient  Plan of Care discussed with: Patient, family    Goals:     Goals: PO intake 50% or greater,prior to discharge       Nutrition Monitoring and Evaluation:   Behavioral-Environmental Outcomes: None Identified  Food/Nutrient Intake Outcomes: Food and Nutrient Intake,Supplement Intake  Physical Signs/Symptoms Outcomes: Biochemical Data,Nutrition Focused Physical Findings,Weight    Discharge Planning:    Continue current diet,Continue Oral Nutrition Supplement     [address]: [id number]

## 2022-06-30 NOTE — PLAN OF CARE
Patient remains free from falls and injuries. Patient does not get up without asking for staff help this shift.   Patient has call light within reach and non skid footwear on. Mauro Lopes RN

## 2022-06-30 NOTE — PROGRESS NOTES
Speech Language Pathology  Clinical Bedside Swallow Assessment  Facility/Department: [facility]        Recommendations:  Diet recommendation: IDDSI 6 Soft and Bite Sized Solids; IDDSI 2 Mildly Thick (nectar) Liquids; Meds crushed in puree as able  Instrumentation: pt will benefit from repeat instrumental swallow study in the future, will continue to monitor  Risk management: upright for all intake, stay upright for at least 30 mins after intake, small bites/sips, set-up assistance / distant supervision with intake, alternate bites/sips, slow rate of intake, general aspiration precautions and hold PO and contact SLP if s/s of aspiration or worsening respiratory status develop. Bandar Frye  : 1960 (60 y.o.)   MRN: [id number]  ROOM: [address] [place] [address]  ADMISSION DATE: 2022  PATIENT DIAGNOSIS(ES): Acute CVA (cerebrovascular accident) ([facility] [I63.9]  No chief complaint on file. Patient Active Problem List    Diagnosis Date Noted    Acute CVA (cerebrovascular accident) ([facility] 2022     Past Medical History:   Diagnosis Date    CVA (cerebral vascular accident) ([facility]      No past surgical history on file. No Known Allergies    DATE ONSET: Pt admitted to [address] [place] [address] on 22    Date of Evaluation: 2022   Evaluating Therapist: GEN Levy    Chart Reviewed: : [x] Yes [] No    Current Diet: ADULT DIET; Dysphagia - Soft and Bite Sized; Mildly Thick ([place])  ADULT ORAL NUTRITION SUPPLEMENT; Lunch, Dinner; Frozen Oral Supplement    Recent Chest Radiography: [] Chest XR   [] CT of Chest  Date: n/a  Impressions: n/a    Pain: no c/o pain    Reason for Referral  Marbin Kurtz was referred for a bedside swallow evaluation to assess the efficiency of their swallow function, identify signs and symptoms of aspiration and make recommendations regarding safe dietary consistencies, effective compensatory strategies, and safe eating environment.     Assessment    Medical record review/interview: Pt s/p L MCA CVA, transferred to [address] [place] [address] from [facility]. Predisposing dysphagia risk factors: Current smoker  Clinical signs of possible chronic dysphagia: N/A  Precipitating dysphagia risk factors: acute neurological event    Patient Complaints:  Odynophagia: [] Yes [x] No  Globus Sensation: [] Yes [x] No  SOB with PO intake: [] Yes [x] No  Increased WOB with PO intake: [] Yes [x] No  Reflux Sx's: [] Yes [x] No  Weight loss: [] Yes [x] No  Coughing/Choking with PO intake: [x] Yes [] No (*pt endorsed occasional difficulty drinking liquids prior to CVA)  Reduced Appetite: [] Yes [x] No    Additional Reported Symptoms/Complaints/Hospital Course: Pt s/p L MCA CVA. MBSS completed on 6/28/22 at [facility]. SLP note states \"Patient presents with oropharyngeal dysphagia secondary to perioral weakness w/ disorganized oral A-P transit, reduced hyolaryngeal elevation and delayed swallow initiation to the pyriforms with thin liquids, resulting in observed trace aspiration of thin liquids during the swallow which is mostly silent in nature. No observed penetration/aspiration of nectar-thick liquids, honey-thick liquids, puree, soft solids or hard solids. Chin tuck, small bolus size and cough/re-swallow did not improve swallow safety with thin liquids. Based on today's assessment, a Dysphagia 3 (mechanical soft) diet with nectar-thick liquids is recommended. Pt may have ice chips and small sips of thin water between meals after oral care\". Pt's goals: \"to get better\"    Vitals/labs:   Temp: n/a  SpO2: 97% on RA  RR: 16/min  BP: 118/79  HR: 84      CBC:   Recent Labs     06/30/22  0738   WBC 5.2   HGB 14.9         BMP:  Recent Labs     06/30/22  0737   *   K 4.0      CO2 27   BUN 13   CREATININE 0.8   GLUCOSE 111*          Cranial nerve exam:   CN V (trigeminal): ophthalmic, maxillary, and mandibular facial sensation- Impaired R  CN VII (facial):  Impaired R and weak  CN IX/X crushed in puree as able  Instrumentation: pt will benefit from repeat instrumental swallow study in the future, will continue to monitor  Risk management: upright for all intake, stay upright for at least 30 mins after intake, small bites/sips, set-up assistance / distant supervision with intake, alternate bites/sips, slow rate of intake, general aspiration precautions and hold PO and contact SLP if s/s of aspiration or worsening respiratory status develop.       Prognosis: Good    Recommended Intervention:   [x] Dysphagia tx  [] Videostroboscopy                      [] NPO   [x] MBS       [x] Speech/Cog Eval    [] Therapeutic PO Trials     [x] Ice Chips   [] Other:  [] FEES                                                 Dysphagia Therapeutic Intervention:   []  Bolus control Exercises  [x]  Oral Motor Exercises  [x]  [employer] Protocol  []  Thermal Stimulation  []  Oral Care    [x]  Vital Stim/NMES  [x]  Laryngeal Exercises  [x]  Patient/Family Education  [x]  Pharyngeal Exercises  []  Therapeutic PO trials with SLP  [x]  Diet tolerance monitoring  []  Other:     Referrals:  [] ENT    [] PT  [] Pulmonology [] GI  [] Neurology  [] RD  [] OT   []     Goals:  Short Term Goals:   Timeframe for Short Term Goals (14 days, 7/13/22)  Goal 1: The patient will tolerate recommended diet with no clinical s/s of aspiration 5/5  Goal 2: The patient/caregiver will demonstrate understanding of compensatory swallow strategies, for improved swallow safety  Goal 3: The patient will tolerate instrumental assessment when able   Goal 4: The patient will complete laryngopharyngeal strengthening exercises with 90% acc given min cues    Long Term Goals:   Timeframe for Long Term Goals (21 days, 7/20/22)  Goal 1: The patient will tolerate least restrictive diet with no clinical s/s of aspiration or worsening respiratory/pulmonary status    Treatment:  Skilled instruction completed with patient re: evidenced based practice regarding recommendations and POC, importance of oral care to reduce adverse affects in the event of aspiration, and instruction of recommended compensatory strategies developed based upon clinical exam. Pt able to recall/demonstrate compensatory strategies with min cues. Pt Education: SLP educated the patient re: Role of SLP, rationale for completion of assessment, recommendations and POC  Pt Education Response: verbalized understanding    Duration/Frequency of Tx: 5x/week for LOS, 21 days (7/20/22)    Individuals Consulted:   [x]  Patient     []  NP         []  RN   []  RD                   []  MD      []  Family Member                        []  PA    []  Other:      Safety Devices / Report:  [x]  All fall risk precautions in place []  Safety handoff completed with RN  []  Bed alarm in place  []  Left in bed     [x]  Chair alarm in place  [x]  Left in chair   [x]  Call light in reach   []  Other:                        Total Treatment Time / Charges       Time in Time out Total Time / units   Swallow Eval/Tx Time  6975 1130 15 min / 1 unit     Signature:  Caryle Kite, M.S. [id number] [facility]  Speech-language pathologist  [id number]

## 2022-06-30 NOTE — PROGRESS NOTES
Occupational Therapy  Facility/Department: [facility]  Rehabilitation Occupational Therapy Evaluation       Date: 22  Patient Name: Estiven Barahona       Room: [facility]  MRN: [id number]  Account: [de-identified]   : 1960  (63 y.o.) Gender: male     Referring Practitioner: Shannan Cole MD  Diagnosis: Acute CVA       Restrictions  Restrictions/Precautions: Fall Risk  Other position/activity restrictions: R sided weakness; up with assist    Subjective  Subjective: Pt in chair upon arrival, agreeable to therapy eval, RN cleared OT; pt denied pain          Vitals  Temp: 98 °F (36.7 °C)  Heart Rate: 84  Resp: 16  BP: 121/77  Oxygen Therapy  SpO2: 98 %  Pulse Oximetry Type: Intermittent  Pulse Oximeter Device Mode: Intermittent  Pulse Oximeter Device Location: Finger  O2 Device: None (Room air)  Level of Consciousness: Alert (0)    Objective     Cognition  Overall Cognitive Status: Exceptions  Arousal/Alertness: Appropriate responses to stimuli  Following Commands: Follows one step commands with repetition; Follows one step commands consistently  Attention Span: Attends with cues to redirect  Memory: Appears intact  Safety Judgement: Decreased awareness of need for assistance;Decreased awareness of need for safety  Problem Solving: Assistance required to generate solutions;Assistance required to identify errors made  Insights: Decreased awareness of deficits  Initiation: Requires cues for some  Sequencing: Requires cues for some  Cognition Comment: impulsive tendencies  Orientation  Orientation Level: Oriented to time;Oriented to situation;Oriented to person;Oriented to place (unable to state name of place but knew it was a place for rehab; accepted orientation to [facility] with street names for further specification)     Perception  Overall Perceptual Status: Impaired  Unilateral Attention: Cues to attend to right side of body;Cues to maintain midline in sitting;Cues to maintain midline in standing  Initiation: Cues to initiate tasks  Motor Planning: Cues to use objects appropriately  Sensation  Overall Sensation Status: Impaired  Additional Comments: reports numbness on R side of body including UE and LE     ROM  LUE AROM (degrees)  LUE AROM : WFL  Left Hand AROM (degrees)  Left Hand AROM: WFL (dupuytren's contracture ulnar side)  RUE PROM (degrees)  RUE PROM: WFL  RUE AROM (degrees)  RUE AROM : Exceptions  RUE General AROM: hypotonic  Right Hand PROM (degrees)  Right Hand PROM: WFL (dupuytren's contracture ulnar side)  Right Hand AROM (degrees)  Right Hand AROM: Exceptions  Right Hand General AROM: hypotonic    Strength    LUE Strength  Gross LUE Strength: WFL  RUE Strength  Gross RUE Strength: Exceptions to WFL (muscle activation felt, 2/5)  R Hand General: 2/5 (minimal AROM digit flexion noted)    Fine Motor Skills  Coordination  Movements Are Fluid And Coordinated: No  Coordination and Movement Description: Fine motor impairments;Gross motor impairments;Decreased speed;Decreased accuracy; Right UE       Hand Assessment  Hand Dominance  Hand Dominance: Right    Functional Mobility  Balance  Sitting Balance: Contact guard assistance  Standing Balance: Maximum assistance  Standing Balance  Time: x30-60 seconds  Activity: static standing at RW at start of session, static standing at edge of chair to trial platform walker and while donning LB clothing over hips  Transfers  Sit to stand: Moderate assistance  Stand to sit: Moderate assistance  Transfer Comments: cues for safe hand placement and for management of RUE  Toilet Transfers  Toilet - Technique: Ambulating  Equipment Used: Standard toilet (with L grab bar)  Toilet Transfer: Maximum assistance  Toilet Transfers Comments: cues for safe hand placement; decreased proprioception when going to initate sit down to toilet  Shower Transfers  Shower - Transfer From: Blanca Burkett - Transfer Type: To  Shower - Transfer To:  Transfer tub bench  Shower - Technique: Ambulating  Shower Transfers: Dependent  Shower Transfers Comments: RW with Max A to TTB in walk in shower; Use of Kyleigh Ohm with Mod A to stand from TTB  Social/Functional History  Bathroom Equipment: Grab bars in shower (bench, not typical bathroom but could use if needed)  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; per OT pt reported   Leisure & Hobbies: hunting    ADL  Feeding: Setup; Thickened liquids  Grooming: Maximum assistance; Increased time to complete;Verbal cueing;Setup  Grooming Skilled Clinical Factors: in stance with Kyleigh Ohm at sink to brush teeth; Assistance to stabilize toothpase in R hand, to screw cap back on; faciliated weight bearing through R hand on Maikel Cashing; Dep for cleaning under fingernails on R hand  UE Bathing: Maximum assistance  UE Bathing Skilled Clinical Factors: assistance to wash back, to rinse UB, and for drying; educated on lifting RUE with L hand for axillary cleaning  LE Bathing: Maximum assistance  LE Bathing Skilled Clinical Factors: to wash posterior buttocks and B lower legs, to rinse, and to dry  UE Dressing: Maximum assistance  UE Dressing Skilled Clinical Factors: educated on katlyn dressing techniques when gown donning  LE Dressing: Maximum assistance  LE Dressing Skilled Clinical Factors: pt able to thread L foot into brief and to pull up pants to knees once threaded, pt managed standing with Mod A to platform walker, required dep Assistance for pulling up over hips  Toileting: Maximum assistance  Toileting Skilled Clinical Factors: cues for task sequencing, pt urinated on toilet with CGA for balance; did not have brief on at time of toileting; anticipate pt would be dependent for posterior robert care after BM    Goals  Patient Goals   Patient goals : \"to get back to normal\"  Short Term Goals  Time Frame for Short term goals: within 7 days by 7/6/22  Short Term Goal 1: Pt will perform toilet transfer with Mod A and LRAD  Short Term Goal 2: Pt will perform LB dressing with Min A and AE PRN  Short Term Goal 3: Pt will perform UB dressing with Min A and appropriate katlyn techniques  Short Term Goal 4: Pt will perform functional dynamic balance activity in stance for >5 minutes with Min A and LRAD  Short Term Goal 5: Pt will perform 2-3 grooming tasks with Min A for during tasks standing vs sitting with LRAD and Min A for balance  Long Term Goals  Time Frame for Long term goals : within 21 days by  Long Term Goal 1: Pt will perform TB dressing Mod I  Long Term Goal 2: Pt will perfrom TB bathing Mod I and AE PRN  Long Term Goal 3: Pt will perform simple IADL task in stance with LRAD for >7 minutes with supervision  Long Term Goal 4: Pt will self feed with R hand with Min A  Long Term Goal 5: Add goal after objective R vs L hand assessment completed with  strength, box and blocks, etc.    Assessment  Performance deficits / Impairments: Decreased functional mobility ; Decreased endurance;Decreased coordination;Decreased posture;Decreased ADL status; Decreased sensation;Decreased balance;Decreased strength;Decreased safe awareness;Decreased high-level IADLs;Decreased cognition;Decreased fine motor control  Assessment: Mr. Gabriel Martinez is a pleasant and very motivated 61 yo male who presents to the ARU s/p L brain stroke with R side hemiparesis. PTA pt lived at home with brother, managing the household, ADLs and IADLs independently, ambulating without AD. Currently, Cheryll Leventhal is most limited by R sided weakness, muscle grade RUE grossly 2/5 witth some noted movement, including bicep activation and digit flexor activation. Max A for ambulation with RW and platform walker, Max A for Bathing and Dressing and for toilet transfer. Pt will benefit from contined OT while admitted.  Upon d/c pt will likely require 24/7 assist/supervision at home and OT  Prognosis: Good  Decision Making: High Complexity  Discharge Recommendations: Home with Home health OT;24 hour supervision or assist  Plan  Times per Week: 5 of 7 days  Current Treatment Recommendations: Strengthening;Balance training;Functional mobility training; Endurance training;Neuromuscular re-education;Positioning;Equipment evaluation, education, & procurement; Modalities; Patient/Caregiver education & training; Safety education & training;Self-Care / ADL; Home management training;Cognitive/Perceptual training;Coordination training;Sensory integraion       Therapy Time   Individual Concurrent Group Co-treatment   Time In 1230         Time Out 1400         Minutes 90         Timed Code Treatment Minutes: 75 Minutes (15 minute eval)       Nikhil Sequeira, OT

## 2022-06-30 NOTE — H&P
Patient: Gallito Zapata  [id number]  Date: 6/30/2022      Chief Complaint: CVA    History of Present Illness/Hospital Course:  Gallito Zapata is a 60year old male with a past medical history significant for left sided stroke due to ICA dissection (2018) without residual deficits, HLD, and nicotine dependence who presented to  on 6/26/22 with acute onset of right face, arm, and leg weakness. Initial NIH was 9. CT head was negative for acute process. He was given tPA. MRI brain revealed acute infarct involving the left corona radiata and posterior left putamen. His hospital course was complicated by hyperbilirubinemia. He was admitted to [facility] on 6/29/22 due to functional deficits below his baseline. Today he reports continued right sided weakness. He reports that he has some strength in his right leg, but is unable to move his right arm. He denies any numbness, tingling, or pain. He is right handed and concerned about not being able to use his right arm. Prior Level of Function:  Independent in self care, indoor mobility, stairs, functional cognition    Current Level of Function:  Setup/clean-up for eating, oral hygiene  Mod assist for toileting hygiene, sit to stand  Max assist for lower body dressing, putting on/taking off footwear, lying to sitting on side of bed  Dependent for chair/bed transfers     has a past medical history of CVA (cerebral vascular accident) ([facility] [address] has no past surgical history on file. reports that he has quit smoking. He has a 54.00 pack-year smoking history. His smokeless tobacco use includes chew and snuff. He reports current alcohol use of about 14.0 standard drinks of alcohol per week. He reports that he does not use drugs. family history is not on file.     Current Facility-Administered Medications   Medication Dose Route Frequency Provider Last Rate Last Admin    acetaminophen (TYLENOL) tablet 650 mg  650 mg Oral Q4H PRN Moi Thompson MD        enoxaparin (LOVENOX) injection 40 mg  40 mg SubCUTAneous Daily Shea Jolly MD   40 mg at 06/30/22 5581    polyethylene glycol (GLYCOLAX) packet 17 g  17 g Oral Daily PRN Shea Jolly MD        aspirin chewable tablet 81 mg  81 mg Oral Daily Shea Jolly MD   81 mg at 06/30/22 0815    atorvastatin (LIPITOR) tablet 10 mg  10 mg Oral Nightly Shea Jolly MD   10 mg at 06/29/22 2112    nicotine (NICODERM CQ) 21 MG/24HR 1 patch  1 patch TransDERmal Daily Shea Jolly MD   1 patch at 06/30/22 0815    traZODone (DESYREL) tablet 50 mg  50 mg Oral Nightly PRN Shea Jolly MD           No Known Allergies    Current Facility-Administered Medications   Medication Dose Route Frequency Provider Last Rate Last Admin    acetaminophen (TYLENOL) tablet 650 mg  650 mg Oral Q4H PRN Shea Jolly MD        enoxaparin (LOVENOX) injection 40 mg  40 mg SubCUTAneous Daily Shea Jolly MD   40 mg at 06/30/22 8707    polyethylene glycol (GLYCOLAX) packet 17 g  17 g Oral Daily PRN Shea Jolly MD        aspirin chewable tablet 81 mg  81 mg Oral Daily Shea Jolly MD   81 mg at 06/30/22 0815    atorvastatin (LIPITOR) tablet 10 mg  10 mg Oral Nightly Shea Jolly MD   10 mg at 06/29/22 2112    nicotine (NICODERM CQ) 21 MG/24HR 1 patch  1 patch TransDERmal Daily Shea Jolly MD   1 patch at 06/30/22 0815    traZODone (DESYREL) tablet 50 mg  50 mg Oral Nightly PRN Shea Jolly MD             REVIEW OF SYSTEMS:   CONSTITUTIONAL: negative for fevers, chills, diaphoresis, activity change, appetite change, fatigue, night sweats and unexpected weight change. EYES: negative for blurred vision, eye discharge, visual disturbance and icterus. HEENT: negative for hearing loss, tinnitus, ear drainage, sinus pressure, nasal congestion, epistaxis and snoring. RESPIRATORY: Negative for hemoptysis, cough, sputum production.    CARDIOVASCULAR: negative for chest pain, palpitations, exertional chest pressure/discomfort, edema, syncope. GASTROINTESTINAL: negative for nausea, vomiting, diarrhea, constipation, blood in stool and abdominal pain. GENITOURINARY: negative for frequency, dysuria, urinary incontinence, decreased urine volume, and hematuria. HEMATOLOGIC/LYMPHATIC: negative for easy bruising, bleeding and lymphadenopathy. ALLERGIC/IMMUNOLOGIC: negative for recurrent infections, angioedema, anaphylaxis and drug reactions. ENDOCRINE: negative for weight changes and diabetic symptoms including polyuria, polydipsia and polyphagia. MUSCULOSKELETAL: negative for pain, joint swelling, decreased range of motion and muscle weakness. NEUROLOGICAL: negative for headaches, slurred speech, unilateral weakness. PSYCHIATRIC/BEHAVIORAL: negative for hallucinations, behavioral problems, confusion and agitation. All pertinent positives are noted in the HPI. Physical Examination:  Vitals:   Patient Vitals for the past 24 hrs:   BP Temp Temp src Pulse Resp SpO2   06/30/22 1446 121/77 -- -- 84 -- 98 %   06/30/22 1004 118/79 -- -- 75 -- 96 %   06/30/22 0813 118/79 98 °F (36.7 °C) Axillary 75 16 96 %   06/29/22 2045 122/84 98.1 °F (36.7 °C) Oral 85 16 --     Psych: Stable mood, normal judgement, normal affect   Const: No distress  Eyes: Conjunctiva noninjected, no icterus noted; pupils equal, round, and reactive to light. HENT: Atraumatic, normocephalic; Oral mucosa moist  Neck: Trachea midline, neck supple. No thyromegaly noted. CV: Regular rate and rhythm, no murmur rub or gallop noted  Resp: Lungs clear to auscultation bilaterally, no rales wheezes or ronchi, no retractions. Respirations unlabored. GI: Soft, nontender, nondistended. Normal bowel sounds. No palpable masses. Neuro: Alert, oriented, appropriate. Right facial droop otherwise no cranial nerve deficits noted. Sensation intact to light touch. Motor examination reveals right hemiparesis. Given the patients complex condition and risk of further medical complications, rehabilitation services cannot be safely provided at a lower level of care such as a skilled nursing facility. I have compared the patients medical and functional status at the time of the preadmission screening and the same on this date, and there are no significant changes. By signing this document, I acknowledge that I have personally performed a full physical examination on this patient within 24 hours of admission to this inpatient rehabilitation facility and have determined the patient to be able to tolerate the above course of treatment at an intensive level for a reasonable period of time. I will be completing a detailed individualized Plan of Care for this patient by day four of the patients stay based upon the Preadmission Screen, this Post-Admission Evaluation, and the therapy evaluations. Rehabilitation Diagnosis:  Stroke, 1.2, Right Body (L Brain)    Assessment and Plan:  Trisha Lagunas is a 60year old male with a past medical history significant for left sided stroke due to ICA dissection (2018) without residual deficits, HLD, and nicotine dependence who presented to  on 6/26/22 with acute onset of right face, arm, and leg weakness, found to have acute CVA. He was admitted to [facility] on 6/29/22 due to functional deficits below his baseline. Acute CVA  - acute infarct involving the left corona radiata and posterior left putamen  - etiology suspected to be small vessel  - s/p tPA  - with right hemiparesis, dysarthria, dysphagia  - secondary stroke prevention with asa and statin  - follow up in stroke clinic outpatient  - PT, OT, ST    Hyperbilirubinemia  - suspected due to [place] Syndrome  - follow up with PCP    Nicotine Dependence  - quit smoking 2 years ago but still wearing patches  - continue nicotine patch    Bowels: Schedule stool softener. Follow bowel movements. Enema or suppository if needed. Bladder: Check PVR x 3. [address] if PVR > 200ml or if any volume is > 500 ml. Sleep: Trazodone provided prn. Follow up appointments: PCP, Stroke clinic    [address] MD 6/30/2022, 3:46 PM

## 2022-07-01 PROCEDURE — 6360000002 HC RX W HCPCS: Performed by: STUDENT IN AN ORGANIZED HEALTH CARE EDUCATION/TRAINING PROGRAM

## 2022-07-01 PROCEDURE — 92526 ORAL FUNCTION THERAPY: CPT

## 2022-07-01 PROCEDURE — 1280000000 HC REHAB R&B

## 2022-07-01 PROCEDURE — 97116 GAIT TRAINING THERAPY: CPT

## 2022-07-01 PROCEDURE — 6370000000 HC RX 637 (ALT 250 FOR IP): Performed by: STUDENT IN AN ORGANIZED HEALTH CARE EDUCATION/TRAINING PROGRAM

## 2022-07-01 PROCEDURE — 92507 TX SP LANG VOICE COMM INDIV: CPT

## 2022-07-01 PROCEDURE — 97129 THER IVNTJ 1ST 15 MIN: CPT

## 2022-07-01 PROCEDURE — 97112 NEUROMUSCULAR REEDUCATION: CPT

## 2022-07-01 PROCEDURE — 97530 THERAPEUTIC ACTIVITIES: CPT

## 2022-07-01 RX ADMIN — ASPIRIN 81 MG: 81 TABLET, CHEWABLE ORAL at 08:20

## 2022-07-01 RX ADMIN — ATORVASTATIN CALCIUM 10 MG: 10 TABLET, FILM COATED ORAL at 20:36

## 2022-07-01 RX ADMIN — TRAZODONE HYDROCHLORIDE 50 MG: 50 TABLET ORAL at 20:36

## 2022-07-01 RX ADMIN — ENOXAPARIN SODIUM 40 MG: 100 INJECTION SUBCUTANEOUS at 08:20

## 2022-07-01 ASSESSMENT — PAIN SCALES - GENERAL
PAINLEVEL_OUTOF10: 0

## 2022-07-01 NOTE — PROGRESS NOTES
Speech Language Pathology  [facility]: ACUTE REHAB UNIT  SPEECH-LANGUAGE PATHOLOGY      [x] Daily  [] Weekly Care Conference Note  [] Discharge    Ruby Chavira      TFW:[birth year]  MID:[id number]  Rehab Dx/Hx: Acute CVA (cerebrovascular accident) ([facility] [address] [I63.9]    Precautions: falls  Home situation: lives with brother, manages own finances, did not take any meds PTA, active   ST Dx: [] Aphasia  [x] Dysarthria  [] Apraxia   [] Oropharyngeal dysphagia [x] Cognitive Impairment  [] Other:   Date of Admit: 6/29/2022  Room #: 0166/0166-01    Current functional status (updated daily):         Pt being seen for : [x] Speech/Language Treatment  [] Dysphagia Treatment [x] Cognitive Treatment  [] Other:  Communication: []WFL  [] Aphasia  [x] Dysarthria  [] Apraxia  [] Pragmatic Impairment [] Non-verbal  [] Hearing Loss  [] Other:   Cognition: [] WFL  [x] Mild  [] Moderate  [] Severe [] Unable to Assess  [] Other:  Memory: [] WFL  [x] Mild  [] Moderate  [] Severe [] Unable to Assess  [] Other:  Behavior: [x] Alert  [x] Cooperative  [x]  Pleasant  [] Confused  [] Agitated  [] Uncooperative  [] Distractible [] Motivated  [] Self-Limiting [] Anxious  [] Other:  Endurance:  [x] Adequate for participation in SLP sessions  [] Reduced overall  [] Lethargic  [] Other:  Safety: [x] No concerns at this time  [] Reduced insight into deficits  []  Reduced safety awareness [] Not following call light procedures  [] Unable to Assess  [] Other:    Current Diet Order:ADULT DIET;  Dysphagia - Soft and Bite Sized; Mildly Thick ([place])  ADULT ORAL NUTRITION SUPPLEMENT; Lunch, Dinner; Frozen Oral Supplement  Swallowing Precautions: upright for all intake, stay upright for at least 30 mins after intake, small bites/sips, set-up assistance / distant supervision with intake, alternate bites/sips, slow rate of intake, general aspiration precautions and hold PO and contact SLP if s/s of aspiration or worsening respiratory status develop.           Date: 7/1/2022      Tx session 1  [id number] Tx session 2  [id number]   Total Timed Code Min 15 0   Total Treatment Minutes 30 30   Individual Treatment Minutes 30 30   Group Treatment Minutes 0 0   Co-Treat Minutes 0 0   Variance/Reason:  0 0   Pain Denies Denies   Pain Intervention [] RN notified  [] Repositioned  [] Intervention offered and patient declined  [x] N/A  [] Other: [] RN notified  [] Repositioned  [] Intervention offered and patient declined  [x] N/A  [] Other:   Subjective     Pt alert and oriented, cooperative and agreeable to participate in therapy. Pt seen sitting upright in bedside chair. Pt alert and oriented, cooperative and agreeable to participate in therapy. Pt seen sitting upright in bedside chair. Objective:  Goals     Dysphagia Goals:  Short-term Goals  Timeframe for Short-term Goals: 14 days (7/13/22)    Goal 1. The patient will tolerate recommended diet without observed clinical signs of aspiration. Did not target. Did not target with recommended diet recommendations. SLP did introduce FFWP (RN aware, sheet left hanging on patients wall in room)  -thin liquids via cup x10  -pt presented with delayed throat clearing on 50% of trials, and one delayed cough        Goal 2. The patient/caregiver will demonstrate understanding of compensatory strategies for improved swallowing safety. Did not target. Pt able to verbalize strict guidelines for FFWP and aspiration precautions. Handout left hanging on pt's wall in room regarding information about FFWP. Goal 3. The patient will tolerate instrumental swallowing procedure Ongoing- not clinically warranted at this time. Ongoing- not clinically warranted at this time. Goal 4: The pt will complete laryngeal/pharyngeal strengthening exercises in 10/10 trials, min cues.     Did not target.  -Effortful swallow: x20  -isometric tongue tip hold for 5 secs: x10  -jaw jut hold for 5 secs: x10  -resistive jaw opening hold for 5 seconds: x10  -falsetto \"ee\": x10     *New goal added 07/01/22*  Goal 5. The pt will complete OME's for improved oral motor function, coordination, strength, and ROM in 10/10 trials, min cues. OME's:  -smile: x10   -pucker: x10  -smile/pucker: x10  -open mouth wide: x10  -cheek puff hold for 3 seconds: x10  -eye/cheek squint: x10  -pucker to affected side: x10   Did not target. Speech/Lang/Cog goals:  Short-term Goals  Timeframe for Short-term Goals: 14 days (7/13/22)    Goal 1: The pt will complete graded recall tasks with 90% accuracy, min-no cues. Functional recall-word progression task:  -pt given a set of three words and asked to repeat them in order that they occur  -ex: Sept, May, Nov= May, Sept, Nov  -100% acc indep  Did not target. Goal 2: Goal 2: The pt will complete executive function tasks (i.e. planning, deductive reasoning, inferential, functional organization tasks) with 90% accuracy no cues. Did not target  Did not target. Goal 3: The pt will complete high-level problem-solving tasks (*particularly related to safety scenarios) with 95% accuracy, no cues. Did not target. Did not target. Goal 4: The pt will complete articulatory agility tasks with 70% accuracy, mod cues. Did not target. Did not target. Goal 5: The pt will effectively use compensatory speech strategies during structured speech tasks with 70% accuracy, mod cues. Discussed use of SLOB acronym for improved speech intelligibility  -slow, loud, over articulation, breath support  -pt's speech was judged to be ~50% intelligible indep, given mod cues for use of strategies   Pt's speech overall 50% intelligible throughout this tx session, however improved indep carryover of speech strategies for improved intelligibility.     Other areas targeted: N/A N/A   Education:   edu provided re: rationale for tx tasks provided, SLOB for speech strategies   Edu provided re: FFWP, strict aspiration precautions, safe Doris IWLSON CCC-SLP  Speech-Language Pathologist  [phone]

## 2022-07-01 NOTE — PROGRESS NOTES
rolling in W/c, pt required cues to pit R foot up with L leg to clear floor  Transfers  Surface: To mat;From chair with arms; Wheelchair  Additional Factors: Set-up; Verbal cues; Increased time to complete;Hand placement cues; With handrails  Device: Walker (and // bars)  Sit to Stand  Assistance Level: Minimal assistance; Requires x 2 assistance  Stand to Sit  Assistance Level: Minimal assistance; Requires x 2 assistance  Sit Pivot  Assistance Level: Moderate assistance  Skilled Clinical Factors: to L, W/C to mat   Neuromuscular Education  Neuromuscular education: Yes  NDT Treatment: Gait ;Upper extremity;Standing;Sitting  Facilitation techniques: OT facilitated R forearm weight bearing while performing elbow props x6 on mat; OT facilitated WB through hand on // bars during ambulation; throughout session OT providing tactile input on shoulder girdle to retract/prevent protaction/winging  Weight Bearing  Weight Bearing Technique: Yes  RUE Weight Bearing: Extended arm standing;Forearm seated  LUE Weight Bearing: Forearm seated  Response To Weight Bearing Technique: improved response to WB technique/tactile/Kashia cueing for outstretch R and L palm     Assessment  Assessment  Assessment: Madison Rojo is progressing in therapy steadily, currently limited by R hemiparesis. Demo'd poor safety awareness, requiring consistent cueing for RUE safety. Functional Mobility: Min A x2 to stand up to // bars; ambulation within // bars  Neuromuscular re education:  WB on fore arm seated and extended hand in stance  Activity was tolerated well, pt required no undue rest breaks. Continue OT POC to address performance deficits in ADLs and functional mobility. Assessment Second Session: Verbal discussion with MD regarding use of Dimple Bertrand MD cleared use. Confirmed with pt that there were no contraindications, pt agreeable and motivated to use e stim. Min A for transfer with RW bed to chair.   Program for pt is under \"jf ot 6\" in Fara tablet, no PMI has been stored on device. - Program G: alternating extension and flexion, 2 trials, 30 s flexion/30s extension   - Program A: grasp/release, OT facilitated shoulder movement with verbal cues and directing pt for visual cue on screen when to activate flexion vs extension, grasping thera sponge and releasing.    - Program A: grasp/release, OT facilitated shoulder flexion and adduction/abduction, pt grasp/release various Squigz with less accuracy then yellow therasponge. Pt also instructed to verbalize color while grasping.    - Program C: Grasp: squeezing thera sponge with wrist neurtal, OT facilitating increased flexion, pt watching for increase feedback. Verbal cue of \"squeeze, squeeze, squeeze\" and relax. Pt with good tolerance of BioNess, minimal redness from pads, resolved quickly. Pt given handout for self PROM, instructed on elbow flexion and supination to begin with over the weekend. Demo'd teach back. Also given list of exercises to work on digit isolation and dextremity of L hand including, digit bends, wrist ext with palm beginning on table, and single digit extension with palm on table. Pt demo'd good teach back, limited by Dupuytren's contracture. Pt left in chair with needs within reach, alarm set, RUE supported on pillows. Activity Tolerance: Patient tolerated treatment well  Discharge Recommendations: Home with Home health OT;24 hour supervision or assist  OT Equipment Recommendations  Equipment Needed: Yes  Mobility Devices: ADL Assistive Devices; Almanzar Dessert; Wheelchair  Walker: Platform Right  Wheelchair: Standard; Wheelchair Cushion Standard  ADL Assistive Devices: Toileting - 3-in-1 Commode;Grab Bars - shower; Shower Chair with back; Reacher;Sock-Aid Hard;Elastic Shoe Laces  Other: Pt will benefit from a BSC to be used in the close confinements of the bedroom overnight to increase independence and safety during toileting considering pt's R sided hemiparesis and decreased dynamic balance. Safety Devices  Safety Devices in place: Yes  Type of devices:  (pt left on mat table with PT with gait belt donned at end of first session)    Patient Education  Education  Education Given To: Patient  Education Provided: Role of Therapy; ADL Function;Equipment; Mobility Training;Transfer Training;Energy Conservation; Safety;Precautions;Plan of Care;DME/Home Modifications  Education Provided Comments: disease specific: safety with R arm  Education Method: Verbal;Demonstration  Barriers to Learning: Cognition  Education Outcome: Verbalized understanding;Continued education needed    Plan  Plan  Times per Week: 5 of 7 days  Current Treatment Recommendations: Strengthening;Balance training;Functional mobility training; Endurance training;Neuromuscular re-education;Positioning;Equipment evaluation, education, & procurement; Modalities; Patient/Caregiver education & training; Safety education & training;Self-Care / ADL; Home management training;Cognitive/Perceptual training;Coordination training;Sensory integraion    Goals  Patient Goals   Patient goals : \"to get back to normal\"  Short Term Goals  Time Frame for Short term goals: within 7 days by 7/6/22  Short Term Goal 1: Pt will perform toilet transfer with Mod A and LRAD  Short Term Goal 2: Pt will perform LB dressing with Min A and AE PRN  Short Term Goal 3: Pt will perform UB dressing with Min A and appropriate katlyn techniques  Short Term Goal 4: Pt will perform functional dynamic balance activity in stance for >5 minutes with Min A and LRAD  Short Term Goal 5: Pt will perform 2-3 grooming tasks with Min A for during tasks standing vs sitting with LRAD and Min A for balance  Long Term Goals  Time Frame for Long term goals : within 21 days by  Long Term Goal 1: Pt will perform TB dressing Mod I  Long Term Goal 2: Pt will perfrom TB bathing Mod I and AE PRN  Long Term Goal 3: Pt will perform simple IADL task in stance with LRAD for >7 minutes with supervision  Long Term Goal 4: Pt will self feed with R hand with Min A  Long Term Goal 5: Add goal after objective R vs L hand assessment completed with  strength, box and blocks, etc.      Therapy Time   Individual Concurrent Group Co-treatment   Time In       0900   Time Out       0930   Minutes       30   Timed Code Treatment Minutes: 30 Minutes     Co-tx collaboration this date to safely meet goals and will have better occupational performance outcomes with in a co-treatment than 1:1 session.     Second Session Therapy Time:   Individual Concurrent Group Co-treatment   Time In 1300        Time Out 1400        Minutes 60          Timed Code Treatment Minutes:  60 Minutes    Total Treatment Minutes:  90 minutes    Rolando Baum, OT

## 2022-07-02 PROCEDURE — 6360000002 HC RX W HCPCS: Performed by: STUDENT IN AN ORGANIZED HEALTH CARE EDUCATION/TRAINING PROGRAM

## 2022-07-02 PROCEDURE — 97530 THERAPEUTIC ACTIVITIES: CPT

## 2022-07-02 PROCEDURE — 97535 SELF CARE MNGMENT TRAINING: CPT

## 2022-07-02 PROCEDURE — 92526 ORAL FUNCTION THERAPY: CPT

## 2022-07-02 PROCEDURE — 97129 THER IVNTJ 1ST 15 MIN: CPT

## 2022-07-02 PROCEDURE — 92507 TX SP LANG VOICE COMM INDIV: CPT

## 2022-07-02 PROCEDURE — 97110 THERAPEUTIC EXERCISES: CPT

## 2022-07-02 PROCEDURE — 6370000000 HC RX 637 (ALT 250 FOR IP): Performed by: STUDENT IN AN ORGANIZED HEALTH CARE EDUCATION/TRAINING PROGRAM

## 2022-07-02 PROCEDURE — 97116 GAIT TRAINING THERAPY: CPT

## 2022-07-02 PROCEDURE — 1280000000 HC REHAB R&B

## 2022-07-02 RX ADMIN — ASPIRIN 81 MG: 81 TABLET, CHEWABLE ORAL at 09:31

## 2022-07-02 RX ADMIN — ENOXAPARIN SODIUM 40 MG: 100 INJECTION SUBCUTANEOUS at 09:31

## 2022-07-02 RX ADMIN — ATORVASTATIN CALCIUM 10 MG: 10 TABLET, FILM COATED ORAL at 21:32

## 2022-07-02 RX ADMIN — TRAZODONE HYDROCHLORIDE 50 MG: 50 TABLET ORAL at 21:32

## 2022-07-02 ASSESSMENT — PAIN SCALES - GENERAL: PAINLEVEL_OUTOF10: 0

## 2022-07-02 NOTE — PROGRESS NOTES
Physical Therapy  Facility/Department: [facility]  Rehabilitation Physical Therapy Treatment Note    NAME: Zeus Johnson  : 1960 (60 y.o.)  MRN: [id number]  CODE STATUS: Full Code    Date of Service: 22       Restrictions:  Restrictions/Precautions: Fall Risk  Position Activity Restriction  Other position/activity restrictions: R sided weakness; up with assist     SUBJECTIVE  Subjective  Subjective: Pt agreeable to PT session this date  Pain: denied pain        Post Treatment Pain Screening: denied pain         OBJECTIVE  Orientation  Overall Orientation Status: Within Functional Limits    Functional Mobility  Balance  Sitting Balance: Supervision  Standing Balance: Supervision  Sit to Stand  Assistance Level: Moderate assistance (Assist w/ RLE: cued pt to position RLE safely with transfers/mov't)  Stand to Sit  Assistance Level: Moderate assistance      Environmental Mobility  Ambulation  Surface: Level surface  Device: Rolling walker (w/ R arm platform)  Distance: 90'  Activity: Within Unit  Assistance Level: Minimal assistance (Min-ModA to assist W/ RUE/RLE positioning and sequencing)  Gait Deviations: Decreased step length right;Decreased weight shift right;Decreased heel strike right; Wide base of support; Unsteady gait; Path deviations; Slow hortencia  Skilled Clinical Factors: Pt req manual cues at patellar tendon and medial HS for knee ext/flex during amb  PM session: pt amb 10' x 2, 90' x 1 platform RW min A ( cues for R LE placement and advancement)     PT Exercises  Am Exercise Treatment: Pt performed: standing balance w/ LUE reaching and placing for 3-5' 2xs w/ sitting  rest between: min mod A for balance and another handler to facilitate the activity.   Pm  Exercise Treatment:A/AROM Exercises: performed BLE TE 15X EACH (A & AAROM RLE): AP, SAQ, QS, GS, SLR, HIP ABD, LAQ, HS    ASSESSMENT/PROGRESS TOWARDS GOALS  Vital Signs  Temp:  (101/69  HR 92  O2 95% RA)    Assessment  Assessment: First session: Pt seen as cotx w/ OT to facilitate improved safety during mobility. Pt performed standing balance activities in platform walker w/ PT facilitating BLE and wt shifts and OT facilitating BUE facilitation. inpm session,  Pt also was able to perform seated balance/wt shifts for UE strengthening and transfers-ambulation in room, bathroom and hallway. Pt will cont to benefit from skilled PT services to improve function towards goals. Pt will cont to benefit from 24 hr sup and continued therapy after d/c. Second Session. Pt will cont to benefit from skilled PT services to improve function towards goals  Activity Tolerance: Patient tolerated treatment well  Discharge Recommendations: 24 hour supervision or assist;Patient would benefit from continued therapy after discharge  PT Equipment Recommendations  Equipment Needed: No    Goals  Short Term Goals  Short term goal 1: Pt will perform bed mobility with min A  -7/02 not met  Short term goal 2: Pt will perform transfers with min A  -7/02 not met  Short term goal 3: Pt will ambulate 25' with quad cane and mod A  -7/02 not met  Short term goal 4: Pt will negotiate 4 steps with HR and mod A  -7/02 not met    PLAN OF CARE/SAFETY  Plan  Plan:  minutes of therapy at least 5 out of 7 days a week  Plan weeks: 21 days  Current Treatment Recommendations: Strengthening;ROM;Balance training;Functional mobility training;Transfer training; Endurance training; Wheelchair mobility training;Gait training;Stair training;Neuromuscular re-education;Manual Therapy - Soft Tissue Mobilization;Patient/Caregiver education & training; Safety education & training;Home exercise program;Equipment evaluation, education, & procurement; Modalities; Therapeutic activities;ADL/Self-care training;IADL training  Safety Devices  Type of Devices: All darrel prominences offloaded; All fall risk precautions in place;Call light within reach;Gait belt;Patient at risk for falls;Nurse notified; Left in bed;Heels elevated for pressure relief;Bed alarm in place (am session: pt left in care of OT staff. pm session, pt left in bed w/ RN aware.)  Restraints  Restraints Initially in Place: No    EDUCATION  Education  Education Given To: Patient  Education Provided: Role of Therapy; Safety;Equipment; Mobility Training; Fall Prevention Strategies;Transfer Training;Plan of Care;Precautions; ADL Function;DME/Home Modifications  Education Provided Comments: Dx specific\" pt educated on importance of mobility for current deficits. Pt educated on importance of awareness of RUE/RLE for safety and improved NM control.   Education Method: Demonstration;Verbal  Barriers to Learning: Cognition  Education Outcome: Verbalized understanding;Continued education needed        Therapy Time   Individual Concurrent Group Co-treatment   Time In       930   Time Out       1000   Minutes       30     Timed Code Treatment Minutes: 30 Minutes     Second Session Therapy Time:   Individual Concurrent Group Co-treatment   Time In 1330        Time Out 1400         Minutes 30           Timed Code Treatment Minutes:  [facility] [facility], 07/02/22 at 3:02 PM     .

## 2022-07-02 NOTE — PROGRESS NOTES
develop.           Date: 7/2/2022      Tx session 1  [id number] Tx session 2  Targeted goals in first session. [id number]   Total Timed Code Min 15 0   Total Treatment Minutes 30 30   Individual Treatment Minutes 30 30   Group Treatment Minutes 0 0   Co-Treat Minutes 0 0   Variance/Reason:  0 0   Pain Denies Denies   Pain Intervention [] RN notified  [] Repositioned  [] Intervention offered and patient declined  [x] N/A  [] Other: [] RN notified  [] Repositioned  [] Intervention offered and patient declined  [x] N/A  [] Other:   Subjective     Pt alert and oriented, cooperative and agreeable to participate in therapy. Pt seen sitting upright in bedside chair. N/A   Objective:  Goals     Dysphagia Goals:  Short-term Goals  Timeframe for Short-term Goals: 14 days (7/13/22)    Goal 1. The patient will tolerate recommended diet without observed clinical signs of aspiration. Observed pt with soft and bite sized solids and nectar thick liquids for breakfast tray. Pt with occasional wet sounding vocal quality (observed prior to accepting any liquids) and pt noted that is his baseline. Pt noted he chews on the left side of his mouth because he has more teeth on that side. Adequate mastication, bolus manipulation, a-p transfer. No coughing observed with solids. Observed several sips of nectar via straw and no anterior loss, coughing, or throat clears observed.      -thin liquids via cup x4 small sips and x1 medium sip.   -Pt demonstrated a slightly delayed throat clear on 1/4 small sips and 1/1 medium sip. N/A       Goal 2. The patient/caregiver will demonstrate understanding of compensatory strategies for improved swallowing safety. Pt required reminders to take small sips with thin liquid trials. N/A     Goal 3. The patient will tolerate instrumental swallowing procedure Ongoing- not clinically warranted at this time.   N/A   Goal 4: The pt will complete laryngeal/pharyngeal strengthening exercises in 10/10 trials, min cues. -jaw jut hold for 5 secs: x10  -resistive jaw opening hold for 5 seconds: x10  -tongue protrusion x10   -tongue elevation x10  N/A     *New goal added 07/01/22*  Goal 5. The pt will complete OME's for improved oral motor function, coordination, strength, and ROM in 10/10 trials, min cues. OME's:  -smile: x10   -smile/pucker: x10  -cheek puff hold with ST lightly tapping the cheeks x10. N/A    Speech/Lang/Cog goals:  Short-term Goals  Timeframe for Short-term Goals: 14 days (7/13/22)    Goal 1: The pt will complete graded recall tasks with 90% accuracy, min-no cues. Functional recall-mental manipulation task:  -pt given a set of three words and asked to repeat them after manipulating the order.   -ex: pot, mug, chair, dime. Repeate the words in order of smallest to largest.   -75% acc (4/5) indep   -increased to 100% accuracy with min cues. N/A    Goal 2: Goal 2: The pt will complete executive function tasks (i.e. planning, deductive reasoning, inferential, functional organization tasks) with 90% accuracy no cues. Did not target  N/A   Goal 3: The pt will complete high-level problem-solving tasks (*particularly related to safety scenarios) with 95% accuracy, no cues. Did not target. N/A   Goal 4: The pt will complete articulatory agility tasks with 70% accuracy, mod cues. Provided mod cuing for dysdiadochokinesia speech tasks. Keely  N/A   Goal 5: The pt will effectively use compensatory speech strategies during structured speech tasks with 70% accuracy, mod cues. Reviewed use of SLOB acronym for improved speech intelligibility  -slow, loud, over articulation, breath support    Created visuals and practiced stopping after a few words and taking a new breath for longer sentences / phrases. -pt's speech was judged to be ~50% intelligible indep and increased throughout the session when pt demonstrated strategies discussed with cuing provided.      N/A   Other areas targeted: N/A N/A   Education:   edu provided re: rationale for tx tasks provided, SLOB for speech strategies        Safety Devices: [x] Call light within reach  [x] Chair alarm activated  [] Bed alarm activated  [] Other: [x] Call light within reach  [x] Chair alarm activated  [] Bed alarm activated  [] Other:    Assessment: Tx session 1: Pt pleasant and cooperative, agreeable to participate. Pt's overall speech intelligibility improved when he was mindful of SLOB techniques and provided cuing. Pt completed OME's (see above) with occasional min cues provided. Pt seen with breakfast tray and also completed thin water PO trials. Pt completed mental manipulation / recall task, improving throughout the task. Pt motivated to improve. Continue goals above. Plan: Continue as per plan of care. Additional Information:     Barriers toward progress: None  Discharge recommendations:  [] Home independently  [x] Home with assistance []  24 hour supervision  [] ECF [] Other:  Continued Tx Upon Discharge: ? [] Yes [] No [x] TBD based on progress while on ARU [] Vital Stim indicated [] Other:   Estimated discharge date: TBD    Interventions used this date:  [x] Speech/Language Treatment  [] Instruction in HEP [] Group [] Dysphagia Treatment [x] Cognitive Treatment   [] Other:       Total Time Breakdown / Charges    Time in Time out Total Time / units   Cognitive Tx 0800 0815 15 min/ 1 unit    Speech Tx 0815 0845 30 min/ 1 unit    Dysphagia Tx 0845 0900 15 min/ 1 unit        Electronically Signed by     Louisa Lebron [address] [place] [address] SLP   [phone]

## 2022-07-02 NOTE — PLAN OF CARE
Problem: Safety - Adult  Goal: Free from fall injury  7/2/2022 1120 by María Bloom RN  Outcome: Progressing  Note: Pt is high fall risk. Pt will remain free from falls. No falls sustained thus far this shift. Call light within reach. Bed side table within reach. Wheels locked. Bed in lowest position. Bed check in place. Pt instructed to call out for assistance. Pt expressed understanding & calls out appropriately. Pt calls out for toileting needs.   7/1/2022 2342 by Taniya Starr RN  Outcome: Progressing

## 2022-07-03 PROCEDURE — 6370000000 HC RX 637 (ALT 250 FOR IP): Performed by: STUDENT IN AN ORGANIZED HEALTH CARE EDUCATION/TRAINING PROGRAM

## 2022-07-03 PROCEDURE — 1280000000 HC REHAB R&B

## 2022-07-03 PROCEDURE — 6360000002 HC RX W HCPCS: Performed by: STUDENT IN AN ORGANIZED HEALTH CARE EDUCATION/TRAINING PROGRAM

## 2022-07-03 RX ADMIN — ATORVASTATIN CALCIUM 10 MG: 10 TABLET, FILM COATED ORAL at 21:24

## 2022-07-03 RX ADMIN — ASPIRIN 81 MG: 81 TABLET, CHEWABLE ORAL at 09:28

## 2022-07-03 RX ADMIN — ENOXAPARIN SODIUM 40 MG: 100 INJECTION SUBCUTANEOUS at 09:28

## 2022-07-03 RX ADMIN — TRAZODONE HYDROCHLORIDE 50 MG: 50 TABLET ORAL at 21:24

## 2022-07-03 ASSESSMENT — PAIN SCALES - GENERAL: PAINLEVEL_OUTOF10: 0

## 2022-07-04 LAB
ANION GAP SERPL CALCULATED.3IONS-SCNC: 12 MMOL/L (ref 3–16)
BASOPHILS ABSOLUTE: 0 K/UL (ref 0–0.2)
BASOPHILS RELATIVE PERCENT: 0.7 %
BUN BLDV-MCNC: 14 MG/DL (ref 7–20)
CALCIUM SERPL-MCNC: 10.1 MG/DL (ref 8.3–10.6)
CHLORIDE BLD-SCNC: 99 MMOL/L (ref 99–110)
CO2: 25 MMOL/L (ref 21–32)
CREAT SERPL-MCNC: 0.7 MG/DL (ref 0.8–1.3)
EOSINOPHILS ABSOLUTE: 0.1 K/UL (ref 0–0.6)
EOSINOPHILS RELATIVE PERCENT: 1.3 %
GFR AFRICAN AMERICAN: >60
GFR NON-AFRICAN AMERICAN: >60
GLUCOSE BLD-MCNC: 173 MG/DL (ref 70–99)
HCT VFR BLD CALC: 44.6 % (ref 40.5–52.5)
HEMATOLOGY PATH CONSULT: NO
HEMOGLOBIN: 15.5 G/DL (ref 13.5–17.5)
LYMPHOCYTES ABSOLUTE: 1.3 K/UL (ref 1–5.1)
LYMPHOCYTES RELATIVE PERCENT: 24 %
MCH RBC QN AUTO: 38.3 PG (ref 26–34)
MCHC RBC AUTO-ENTMCNC: 34.7 G/DL (ref 31–36)
MCV RBC AUTO: 110.3 FL (ref 80–100)
MONOCYTES ABSOLUTE: 0.7 K/UL (ref 0–1.3)
MONOCYTES RELATIVE PERCENT: 13.4 %
NEUTROPHILS ABSOLUTE: 3.4 K/UL (ref 1.7–7.7)
NEUTROPHILS RELATIVE PERCENT: 60.6 %
PDW BLD-RTO: 13.9 % (ref 12.4–15.4)
PLATELET # BLD: 180 K/UL (ref 135–450)
PMV BLD AUTO: 8 FL (ref 5–10.5)
POTASSIUM REFLEX MAGNESIUM: 4.3 MMOL/L (ref 3.5–5.1)
RBC # BLD: 4.04 M/UL (ref 4.2–5.9)
SODIUM BLD-SCNC: 136 MMOL/L (ref 136–145)
WBC # BLD: 5.5 K/UL (ref 4–11)

## 2022-07-04 PROCEDURE — 1280000000 HC REHAB R&B

## 2022-07-04 PROCEDURE — 80048 BASIC METABOLIC PNL TOTAL CA: CPT

## 2022-07-04 PROCEDURE — 97530 THERAPEUTIC ACTIVITIES: CPT

## 2022-07-04 PROCEDURE — 97535 SELF CARE MNGMENT TRAINING: CPT

## 2022-07-04 PROCEDURE — 97129 THER IVNTJ 1ST 15 MIN: CPT

## 2022-07-04 PROCEDURE — 92507 TX SP LANG VOICE COMM INDIV: CPT

## 2022-07-04 PROCEDURE — 97112 NEUROMUSCULAR REEDUCATION: CPT

## 2022-07-04 PROCEDURE — 97116 GAIT TRAINING THERAPY: CPT

## 2022-07-04 PROCEDURE — 92526 ORAL FUNCTION THERAPY: CPT

## 2022-07-04 PROCEDURE — 36415 COLL VENOUS BLD VENIPUNCTURE: CPT

## 2022-07-04 PROCEDURE — 6360000002 HC RX W HCPCS: Performed by: STUDENT IN AN ORGANIZED HEALTH CARE EDUCATION/TRAINING PROGRAM

## 2022-07-04 PROCEDURE — 97110 THERAPEUTIC EXERCISES: CPT

## 2022-07-04 PROCEDURE — 6370000000 HC RX 637 (ALT 250 FOR IP): Performed by: STUDENT IN AN ORGANIZED HEALTH CARE EDUCATION/TRAINING PROGRAM

## 2022-07-04 PROCEDURE — 85025 COMPLETE CBC W/AUTO DIFF WBC: CPT

## 2022-07-04 RX ADMIN — TRAZODONE HYDROCHLORIDE 50 MG: 50 TABLET ORAL at 20:30

## 2022-07-04 RX ADMIN — ATORVASTATIN CALCIUM 10 MG: 10 TABLET, FILM COATED ORAL at 20:30

## 2022-07-04 RX ADMIN — ENOXAPARIN SODIUM 40 MG: 100 INJECTION SUBCUTANEOUS at 08:09

## 2022-07-04 RX ADMIN — ASPIRIN 81 MG: 81 TABLET, CHEWABLE ORAL at 08:09

## 2022-07-04 ASSESSMENT — PAIN SCALES - GENERAL
PAINLEVEL_OUTOF10: 0

## 2022-07-04 NOTE — PROGRESS NOTES
Speech Language Pathology  [facility]: ACUTE REHAB UNIT  SPEECH-LANGUAGE PATHOLOGY      [x] Daily  [] Weekly Care Conference Note  [] Discharge    Shara Lucio      [facility]:[birth year]  YXU:[id number]  Rehab Dx/Hx: Acute CVA (cerebrovascular accident) ([facility] [address] [I63.9]    Precautions: falls  Home situation: lives with brother, manages own finances, did not take any meds PTA, active   ST Dx: [] Aphasia  [x] Dysarthria  [] Apraxia   [] Oropharyngeal dysphagia [x] Cognitive Impairment  [] Other:   Date of Admit: 6/29/2022  Room #: 0166/0166-01    Current functional status (updated daily):         Pt being seen for : [x] Speech/Language Treatment  [] Dysphagia Treatment [x] Cognitive Treatment  [] Other:  Communication: []WFL  [] Aphasia  [x] Dysarthria  [] Apraxia  [] Pragmatic Impairment [] Non-verbal  [] Hearing Loss  [] Other:   Cognition: [] WFL  [x] Mild  [] Moderate  [] Severe [] Unable to Assess  [] Other:  Memory: [] WFL  [x] Mild  [] Moderate  [] Severe [] Unable to Assess  [] Other:  Behavior: [x] Alert  [x] Cooperative  [x]  Pleasant  [] Confused  [] Agitated  [] Uncooperative  [] Distractible [] Motivated  [] Self-Limiting [] Anxious  [] Other:  Endurance:  [x] Adequate for participation in SLP sessions  [] Reduced overall  [] Lethargic  [] Other:  Safety: [x] No concerns at this time  [] Reduced insight into deficits  []  Reduced safety awareness [] Not following call light procedures  [] Unable to Assess  [] Other:    Current Diet Order:ADULT DIET;  Dysphagia - Soft and Bite Sized; Mildly Thick ([place])  ADULT ORAL NUTRITION SUPPLEMENT; Lunch, Dinner; Frozen Oral Supplement  Swallowing Precautions: upright for all intake, stay upright for at least 30 mins after intake, small bites/sips, set-up assistance / distant supervision with intake, alternate bites/sips, slow rate of intake, general aspiration precautions and hold PO and contact SLP if s/s of aspiration or worsening respiratory status develop.           Date: 7/4/2022      Tx session 1  [id number] Tx session 2  All goals targeted in session 1   Total Timed Code Min 60 0   Total Treatment Minutes 60 0   Individual Treatment Minutes 60 0   Group Treatment Minutes 0 0   Co-Treat Minutes 0 0   Variance/Reason:  0 0   Pain Denies    Pain Intervention [] RN notified  [] Repositioned  [] Intervention offered and patient declined  [x] N/A  [] Other: [] RN notified  [] Repositioned  [] Intervention offered and patient declined  [x] N/A  [] Other:   Subjective     Pt alert and oriented, cooperative and agreeable to participate in therapy. Pt seen sitting upright in bedside chair. Pt stated he is \"ready for therapy\" and wants to [facility] hard to improve. \"      Objective:  Goals     Dysphagia Goals:  Short-term Goals  Timeframe for Short-term Goals: 14 days (7/13/22)    Goal 1. The patient will tolerate recommended diet without observed clinical signs of aspiration. Pt seen drinking 6 oz of nectar thick liquid through a straw this date. Pt demonstrated minimal instances of throat clearing during intake, and x2 instances of wet vocal quality. The SLP cued pt to \"clear throat\". Pt able to take small sips from the straw, with min cues from SLP. Pt stated that he was \"fine\" with drinking the thickened liquids since it was recommended to him by \"medical experts that know what they are doing\". Pt stated that he ate his morning meal with \"no trouble swallowing. \" SLP saw pt's food tray after completion of breakfast, pt had eaten 100%. Pt drank x1 sip of TL for trial and produced immediate cough. SLP and pt discussed s/s of aspiration. Pt did not trial anymore TL during tx this date, d/t immediate cough. Goal 2. The patient/caregiver will demonstrate understanding of compensatory strategies for improved swallowing safety. SLP edu pt on safe swallow strategies: sitting upright, small bites/sips, taking break between sips, reasoning for nectar thick liquids. Pt voiced understanding, and stated that he is drinking nectar thick because \"he was told to\". Pt able to describe aspiration and reasoning for thickened liquids/pneumonia risk. Goal 3. The patient will tolerate instrumental swallowing procedure Ongoing- not clinically warranted at this time. Goal 4: The pt will complete laryngeal/pharyngeal strengthening exercises in 10/10 trials, min cues. -Effortful swallow: x5  -isometric tongue tip hold for 5 secs: x10  -jaw jut hold for 5 secs: x10  -falsetto \"ee\": x10    *New goal added 07/01/22*  Goal 5. The pt will complete OME's for improved oral motor function, coordination, strength, and ROM in 10/10 trials, min cues. OME's:  -smile: x10   -pucker: x10  -cheek puff hold for 3 seconds: x10      Speech/Lang/Cog goals:  Short-term Goals  Timeframe for Short-term Goals: 14 days (7/13/22)    Goal 1: The pt will complete graded recall tasks with 90% accuracy, min-no cues. Pt able to complete verbal phrase completion/recall task with 100% accuracy. Pt asked to repeat 1/10 recall d/t unintelligible speech. Pt able to use SLOB technique to improve intelligibility to 100%. Goal 2: Goal 2: The pt will complete executive function tasks (i.e. planning, deductive reasoning, inferential, functional organization tasks) with 90% accuracy no cues. Pt able to complete analyzing problem for medicine labels with 80% accuracy indep. Pt had difficulty reading the label (even with glasses on), so SLP read him label aloud. Goal 3: The pt will complete high-level problem-solving tasks (*particularly related to safety scenarios) with 95% accuracy, no cues. Pt able to complete Time word problem (high level problem solving task) with 60%accuracy indep. Pt able to improve to 80% with mod cues. Pt had difficulty with indep recall specific times/numbers and asked for repeats in 50% of questions.     Goal 4: The pt will complete articulatory agility tasks with 70% accuracy, mod cues.   Did not target. Goal 5: The pt will effectively use compensatory speech strategies during structured speech tasks with 70% accuracy, mod cues. Reviewed use of SLOB acronym for improved speech intelligibility  -slow, loud, over articulation, breath support. SLP wrote reasoning for SLOB technique on visual cue, to remind pt why/when to use strategy. -pt's speech was judged to be ~60% intelligible indep, given mod cues for use of strategies      Other areas targeted: N/A    Education:   edu provided re: rationale for tx tasks provided, SLOB for speech strategies, pt's indep performance on cog/speech tasks      Safety Devices: [x] Call light within reach  [x] Chair alarm activated  [] Bed alarm activated  [] Other: [] Call light within reach  [] Chair alarm activated  [] Bed alarm activated  [] Other:    Assessment:  Pt pleasant and cooperative, agreeable to participate. Pt's overall speech intelligibility judged to be ~60, improving with mod cues for use of speech strategies (SLOB acronym- slow, loud, over articulation, adequate breath support). Pt completed OME's (see above) with occasional min cues provided. Pt motivated to improve. Pt stated his goal was to [facility] hard in therapy\" so he can make progress. Continue goals above. Plan: Continue as per plan of care. Additional Information:     Barriers toward progress: None  Discharge recommendations:  [] Home independently  [x] Home with assistance []  24 hour supervision  [] ECF [] Other:  Continued Tx Upon Discharge: ? [] Yes [] No [x] TBD based on progress while on ARU [] Vital Stim indicated [] Other:   Estimated discharge date: TBD    Interventions used this date:  [x] Speech/Language Treatment  [] Instruction in HEP [] Group [x] Dysphagia Treatment [x] Cognitive Treatment   [] Other:       Total Time Breakdown / Charges    Time in Time out Total Time / units   Cognitive Tx 0845 0900 15 mins/ 1 unit   Speech Tx 0800 0830 30 mins/ 2 units Dysphagia Tx 0830 0845 15 mins 1 unit       Electronically Signed by     Hallie Cano  Speech Language Pathologist  [phone]

## 2022-07-04 NOTE — PROGRESS NOTES
Zeus Johnson  7/4/2022  [id number]    Chief Complaint: Acute CVA (cerebrovascular accident) [facility])    Subjective:   No acute events overnight. Today Danie Duval is seen in his room, resting in bed. He denies any acute complaints at this time. ROS: denies f/c, n/v, cp, sob  Objective:  Patient Vitals for the past 24 hrs:   BP Temp Temp src Pulse Resp SpO2   07/04/22 0808 106/73 98.2 °F (36.8 °C) Oral 87 16 98 %   07/03/22 2115 125/86 98.5 °F (36.9 °C) Oral 91 18 96 %     Gen: No distress, pleasant. Supine in bed  HEENT: Normocephalic, atraumatic. CV: Regular rate and rhythm. Resp: No respiratory distress. Abd: Soft, nontender   Ext: No edema. Neuro: Alert, oriented, appropriately interactive. Wt Readings from Last 3 Encounters:   06/29/22 145 lb 15.1 oz (66.2 kg)   02/14/22 145 lb (65.8 kg)       Laboratory data:   Lab Results   Component Value Date    WBC 5.5 07/04/2022    HGB 15.5 07/04/2022    HCT 44.6 07/04/2022    .3 (H) 07/04/2022     07/04/2022       Lab Results   Component Value Date/Time     07/04/2022 08:34 AM    K 4.3 07/04/2022 08:34 AM    CL 99 07/04/2022 08:34 AM    CO2 25 07/04/2022 08:34 AM    BUN 14 07/04/2022 08:34 AM    CREATININE 0.7 07/04/2022 08:34 AM    GLUCOSE 173 07/04/2022 08:34 AM    CALCIUM 10.1 07/04/2022 08:34 AM        Therapy progress:  PT  Position Activity Restriction  Other position/activity restrictions: R sided weakness; up with assist  Objective     Sit to Stand:  Moderate Assistance  Stand to sit: Moderate Assistance  Bed to Chair: Moderate assistance (to L with cues for safety)  Device: Parallel Bars  Assistance: Minimal assistance  Distance: 8' and 20'  OT  PT Equipment Recommendations  Equipment Needed: No  Toilet - Technique: Ambulating  Equipment Used: Standard toilet (with L grab bar)  Toilet Transfers Comments: cues for safe hand placement; decreased proprioception when going to initate sit down to toilet  Assessment        SLP  Current Diet : Soft and Bite-Sized             Body mass index is 18.74 kg/m². Assessment and Plan:  Cameron Ng is a 60year old male with a past medical history significant for left sided stroke due to ICA dissection (2018) without residual deficits, HLD, and nicotine dependence who presented to  on 6/26/22 with acute onset of right face, arm, and leg weakness, found to have acute CVA. He was admitted to [facility] on 6/29/22 due to functional deficits below his baseline.      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 Follow bowel movements. Enema or suppository if needed.      Bladder: Check PVR x 3. [address] if PVR > 200ml or if any volume is > 500 ml.      Sleep: Trazodone provided prn.      Follow up appointments: PCP, Stroke clinic    Trinity Conley MD 7/4/2022, 2:44 PM

## 2022-07-04 NOTE — PROGRESS NOTES
Physical Therapy  Facility/Department: [facility]  Rehabilitation Physical Therapy Treatment Note    NAME: Nyasia Alvarado  : 1960 (60 y.o.)  MRN: [id number]  CODE STATUS: Full Code    Date of Service: 22       Restrictions:  Restrictions/Precautions: Fall Risk  Position Activity Restriction  Other position/activity restrictions: R sided weakness; up with assist     SUBJECTIVE  Subjective  Subjective: Pt was sitting up in chair upon arrival to room with OT and agreeable to PT and OT co-treatment  Pain: Pt denied pain        Post Treatment Pain Screening: Denies pain         OBJECTIVE  Cognition  Overall Cognitive Status: Exceptions  Arousal/Alertness: Appropriate responses to stimuli  Following Commands: Follows one step commands with repetition; Follows one step commands consistently  Attention Span: Attends with cues to redirect  Memory: Appears intact  Safety Judgement: Decreased awareness of need for assistance;Decreased awareness of need for safety  Problem Solving: Assistance required to generate solutions;Assistance required to identify errors made  Insights: Decreased awareness of deficits  Initiation: Requires cues for some  Sequencing: Requires cues for some  Cognition Comment: impulsive tendencies; decreased awareness of RUE during mobility  Orientation  Overall Orientation Status: Within Functional Limits  Orientation Level: Oriented to time;Oriented to situation;Oriented to person;Oriented to place    Functional Mobility  Balance  Sitting Balance: Supervision  Standing Balance: Contact guard assistance (SBA to CGA at RW and in parallel bars)  Transfers  Surface: From chair with arms; Wheelchair  Additional Factors: Hand placement cues; Increased time to complete;Verbal cues  Device: Walker  Sit to Stand  Assistance Level: Minimal assistance; Moderate assistance (fluctuates b/w Min to Mod A from chair in room and W/C; cues for HP and RUE awareness)  Skilled Clinical Factors: improved RLE placement with verbal cues only this date  Stand to Sit  Assistance Level: Minimal assistance; Moderate assistance      Environmental Mobility  Ambulation  Surface: Level surface  Device: Rolling walker (R platform first trial progressing to R clamshell second trial)  Distance: 110 ft with platform RW + 150 ft with R clamshell RW  Activity: Within Unit  Assistance Level: Minimal assistance  Gait Deviations: Decreased step length right;Decreased heel strike right; Unsteady gait; Path deviations; Slow hortencia;Decreased weight shift bilateral (therapist facilitating increased lateral weight shift onto LLE leading to increased R foot clearance in swing although step length and foot clearance remained inconsistent; cues to maintain SERENE within RW with turns)  Stairs  Stair Height: 6''  Device: One handrail (L handrail)  Number of Stairs: 2  Additional Factors: Verbal cues; Hand placement cues; Non-reciprocal going up;Non-reciprocal going down; Increased time to complete  Assistance Level: Moderate assistance (R knee blocked; step by step cues for sequence)  Skilled Clinical Factors: Pre stair training completed in parallel bars before completing stairs; Pt performed step up onto 6\" step x 3 reps leading with LLE ascending and RLE descending (backward off step) with BUE support; assist to maintain  on R parallel bar  Wheelchair  Surface: Level surface  Device: Standard wheelchair  Additional Factors: Set-up; Verbal cues; Hand placement cues; Increased time to complete  Assistance Level for Propulsion: Stand by assist  Propulsion Method: Left lower extremity;Right lower extremity  Propulsion Quality: Slow velocity; Decreased fluidity  Propulsion Distance: 15 ft from parallel bars to stairs  Skilled Clinical Factors: Pt was able to complete brake managements on w/c with verbal cues and hand over hand initially to manage right break progressing to SBA             PT Exercises  A/AROM Exercises: marches on RLE AAROMx 10 reps      ASSESSMENT/PROGRESS TOWARDS GOALS       Assessment  Assessment: Pt tolerated treatment session well this date with continued slow gains in RUE and RLE strength allowing for gait and stair training progression with increased independence. Pt, however, remains at increased risk of falls as gait quality remains impaired with incsonsitent foot clearance on RLE and Pt easily distracted. Pt will continue to benefit from skilled PT services to maximize return to PLOF for safe d/c home when appropriate. Activity Tolerance: Patient tolerated treatment well  Discharge Recommendations: 24 hour supervision or assist;Patient would benefit from continued therapy after discharge  PT Equipment Recommendations  Equipment Needed: No    Goals  Short Term Goals  Short term goal 1: Pt will perform bed mobility with min A  -7/02 NT  Short term goal 2: Pt will perform transfers with min A  -7/04: not met; fluctuates b/w Min and mod A  Short term goal 3: Pt will ambulate 22' with quad cane and mod A  -7/04: Min A 150 ft with RW (platofmr vs clamshell)  Short term goal 4: Pt will negotiate 4 steps with HR and mod A  -7/04: Mod A 2 steps with HR    PLAN OF CARE/SAFETY  Plan  Plan:  minutes of therapy at least 5 out of 7 days a week  Plan weeks: 21 days  Specific Instructions for Next Treatment: R katlyn strength training and continue mobility progression  Current Treatment Recommendations: Strengthening;ROM;Balance training;Functional mobility training;Transfer training; Endurance training; Wheelchair mobility training;Gait training;Stair training;Neuromuscular re-education;Manual Therapy - Soft Tissue Mobilization;Patient/Caregiver education & training; Safety education & training;Home exercise program;Equipment evaluation, education, & procurement; Modalities; Therapeutic activities;ADL/Self-care training;IADL training  Safety Devices  Type of Devices: All darrel prominences offloaded; All fall risk precautions in place;Gait belt;Patient at risk for falls;Nurse notified (left in W/C with OT)  Restraints  Restraints Initially in Place: No    EDUCATION  Education  Education Given To: Patient  Education Provided: Role of Therapy; Safety;Equipment; Mobility Training; Fall Prevention Strategies;Transfer Training;Plan of Care;Precautions; Home Exercise Program  Education Method: Demonstration;Verbal  Barriers to Learning: Cognition  Education Outcome: Verbalized understanding;Continued education needed        Therapy Time   Individual Concurrent Group Co-treatment   Time In       0905   Time Out       0935   Minutes       30     Timed Code Treatment Minutes: Nikki [address], [address], DPT, 07/04/22 at 11:29 AM        Second session later in AM:  Pt resting in recliner chair upon arrival; agreeable to therapy. Pt denies any pain. Sitting LE exercises: x 20 BLE for all  Heel raise/toe taps with 2# ankle weights  LAQ with 2# ankle weights, slow and less ROM on RLE  Seated march with 2# ankle weight on LLE only  HS curl with green Tband, slow and less ROM on RLE    Standing LE exercises at RW:  Calf raises x 10 with min A support  Squats x 10, note R lateral lean with cues to correct and mod A support    Sit to/from stand at RW x 2 reps: min to mod A    Assessment: Pt required cues to perform slow controlled eccentric movements with LE exercises. Pt demonstrated R lateral lean with squatting activity at RW requiring mod A balance support. Pt returned to recliner with call light in reach and chair alarm on.     Second Session Therapy Time:   Individual Concurrent Group Co-treatment   Time In 1030        Time Out 1100        Minutes 30          Timed Code Treatment Minutes:   30 minutes    Total Treatment Minutes:   60 minutes  Heaven Brownlee, PT, DPT   #[phone]

## 2022-07-04 NOTE — PROGRESS NOTES
Occupational Therapy  Facility/Department: [facility]  Rehabilitation Occupational Therapy Daily Treatment Note    Date: 22  Patient Name: Kenia Ramey       Room: 1625/6428-91  MRN: [id number]  Account: [de-identified]   : 1960  (63 y.o.) Gender: male                    Past Medical History:  has a past medical history of CVA (cerebral vascular accident) ([facility] [address] Past Surgical History:   has no past surgical history on file. Restrictions  Restrictions/Precautions: Fall Risk  Other position/activity restrictions: R sided weakness; up with assist    Subjective  Subjective: Pt seated in chair and agreeable to OT/PT cotx  Restrictions/Precautions: Fall Risk             Objective     Cognition  Overall Cognitive Status: Exceptions  Arousal/Alertness: Appropriate responses to stimuli  Following Commands: Follows one step commands with repetition; Follows one step commands consistently  Attention Span: Attends with cues to redirect  Memory: Appears intact  Safety Judgement: Decreased awareness of need for assistance;Decreased awareness of need for safety  Problem Solving: Assistance required to generate solutions;Assistance required to identify errors made  Insights: Decreased awareness of deficits  Initiation: Requires cues for some  Cognition Comment: impulsive tendencies; decreased awareness of RUE during mobility  Orientation  Overall Orientation Status: Within Functional Limits  Orientation Level: Oriented to time;Oriented to situation;Oriented to person;Oriented to place   Perception  Overall Perceptual Status: Impaired  Unilateral Attention: Cues to attend to right side of body;Cues to maintain midline in sitting;Cues to maintain midline in standing  Initiation: Cues to initiate tasks  Motor Planning: Cues to use objects appropriately     ADL  Grooming/Oral Hygiene  Assistance Level: Supervision;Set-up  Skilled Clinical Factors: pt completed while seated in w/c; pt able to complete all parts  Upper Extremity Dressing  Assistance Level: Moderate assistance  Skilled Clinical Factors: pt cues for katlyn-technique; assist provided for threading RUE and adjusting sleeve on L arm  Lower Extremity Dressing  Assistance Level: Increased time to complete; Moderate assistance;Verbal cues  Skilled Clinical Factors: assist with threading RLE through pants, able to therad LLE, assist wiht B shoes and management of pants up/down with RW x2 A support  Putting On/Taking Off Footwear  Assistance Level: Increased time to complete;Maximum assistance  Skilled Clinical Factors: assist wiht donning B socks and tying shoes          Functional Mobility  Device: Wheelchair;Rolling walker (platform vs clamshell on RW)  Activity: To/From therapy gym  Assistance Level: Moderate assistance;Minimal assistance  Skilled Clinical Factors: use of platform to gym, clamshell on RW from gym; cues for hand placement and pacing; w/c follow provided  Transfers  Surface: From chair with arms; Wheelchair; To chair with arms  Additional Factors: Set-up; Verbal cues; Increased time to complete;Hand placement cues; With handrails  Device: Walker (platform vs clamshell RW, min-modA X2 with WC follow and max cues)  Sit to Stand  Assistance Level: Minimal assistance; Requires x 2 assistance; Moderate assistance  Stand to Sit  Assistance Level: Minimal assistance; Requires x 2 assistance; Moderate assistance  Skilled Clinical Factors: platform vs clamshell RW, min-modA X2 with WC follow and max cues  Bed To/From Chair  Technique: Stand step  Assistance Level: Moderate assistance;Minimal assistance; Requires x 2 assistance  Skilled Clinical Factors: platform vs clamshell RW, min-modA X2 with WC follow and max cues  Stand Pivot  Assistance Level: Moderate assistance; Requires x 2 assistance;Maximum assistance   Neuromuscular Education  Neuromuscular education: Yes  NDT Treatment: Gait ;Upper extremity;Standing;Sitting  Weight Bearing  Weight Bearing Technique: Yes  RUE Weight Bearing: Extended arm standing  Response To Weight Bearing Technique: improved response to WB technique/tactile/Craig cueing for outstretch R and L palm; increased WBing to clamshell vs platform RW during functional mobility tasks; hand ove rhand for safety with clamshell placement  OT Exercises  A/AROM Exercises: AAROM full shoulder fleion to 90* with assist to endpoint x10 reps, elbow flexion/extension and chest press with 2.5# dowel rossi 3x10 reps with AAROM to elbow  Dynamic Standing Balance Exercises: Pt tolerated 4x4 minutes for step up/down in // bars, stair training, and functional mobility with clamshell RW vs platform     Assessment  Assessment  Assessment: Patient progressing towards functional mobility goals, grossly min-modA x2 with WC follow needing mod cues for step length and SERENE but able to tolerate 2 trials-- platform RW and clamshell on RW. Pt with increased ability to complete stair training, OT providing cues for sequencing and safety as well as WBing/support to RUE while PT providing support at RLE in // bars and with step/stairs. Pt was then able to complete grasp/release activity with increased extension/flexion. Cont OT POC. Activity Tolerance: Patient tolerated treatment well  Discharge Recommendations: Home with Home health OT;24 hour supervision or assist  OT Equipment Recommendations  Equipment Needed: Yes  Mobility Devices: ADL Assistive Devices; [place]; Wheelchair  Walker: Platform Right  Wheelchair: Standard; Wheelchair Cushion Standard  ADL Assistive Devices: Toileting - 3-in-1 Commode;Grab Bars - shower; Shower Chair with back; Reacher;Sock-Aid Hard;Elastic Shoe Laces  Other: Pt will benefit from a BSC to be used in the close confinements of the bedroom overnight to increase independence and safety during toileting considering pt's R sided hemiparesis and decreased dynamic balance. Safety Devices  Safety Devices in place: Yes  Type of devices: Call light within reach; Chair alarm in place; Left in chair; All fall risk precautions in place; Patient at risk for falls    Patient Education  Education  Education Given To: Patient  Education Provided: Role of Therapy; ADL Function;Equipment; Mobility Training;Transfer Training;Energy Conservation; Safety;Precautions;Plan of Care;DME/Home Modifications  Education Provided Comments: disease specific: safety with R arm  Education Method: Verbal;Demonstration  Barriers to Learning: Cognition  Education Outcome: Verbalized understanding;Continued education needed    Plan  Plan  Times per Week: 5 of 7 days  Times per Day: Daily  Current Treatment Recommendations: Strengthening;Balance training;Functional mobility training; Endurance training;Neuromuscular re-education;Positioning;Equipment evaluation, education, & procurement; Modalities; Patient/Caregiver education & training; Safety education & training;Self-Care / ADL; Home management training;Cognitive/Perceptual training;Coordination training;Sensory integraion    Goals  Patient Goals   Patient goals : \"to get back to normal\"  Short Term Goals  Time Frame for Short term goals: within 7 days by 7/6/22  Short Term Goal 1: Pt will perform toilet transfer with Mod A and LRAD  Short Term Goal 2: Pt will perform LB dressing with Min A and AE PRN  Short Term Goal 3: Pt will perform UB dressing with Min A and appropriate katlyn techniques  Short Term Goal 4: Pt will perform functional dynamic balance activity in stance for >5 minutes with Min A and LRAD  Short Term Goal 5: Pt will perform 2-3 grooming tasks with Min A for during tasks standing vs sitting with LRAD and Min A for balance  Long Term Goals  Time Frame for Long term goals : within 21 days by  Long Term Goal 1: Pt will perform TB dressing Mod I  Long Term Goal 2: Pt will perfrom TB bathing Mod I and AE PRN  Long Term Goal 3: Pt will perform simple IADL task in stance with LRAD for >7 minutes with supervision  Long Term Goal 4: Pt will self feed with R hand with Min A  Long Term Goal 5: Add goal after objective R vs L hand assessment completed with  strength, box and blocks, etc.      Therapy Time   Individual Concurrent Group Co-treatment   Time In 0900 0905   Time Out 0905 0935   Minutes 5     30   Timed Code Treatment Minutes: 35 Minutes     Second Session Therapy Time:   Individual Concurrent Group Co-treatment   Time In 0935        Time Out 1000        Minutes 25          Timed Code Treatment Minutes:  25 Minutes    Total Treatment Minutes:  Loly 41, OTR/L

## 2022-07-05 PROCEDURE — 6360000002 HC RX W HCPCS: Performed by: STUDENT IN AN ORGANIZED HEALTH CARE EDUCATION/TRAINING PROGRAM

## 2022-07-05 PROCEDURE — 97116 GAIT TRAINING THERAPY: CPT

## 2022-07-05 PROCEDURE — 1280000000 HC REHAB R&B

## 2022-07-05 PROCEDURE — 6370000000 HC RX 637 (ALT 250 FOR IP): Performed by: STUDENT IN AN ORGANIZED HEALTH CARE EDUCATION/TRAINING PROGRAM

## 2022-07-05 PROCEDURE — 92526 ORAL FUNCTION THERAPY: CPT

## 2022-07-05 PROCEDURE — 92507 TX SP LANG VOICE COMM INDIV: CPT

## 2022-07-05 PROCEDURE — 97112 NEUROMUSCULAR REEDUCATION: CPT

## 2022-07-05 PROCEDURE — 97530 THERAPEUTIC ACTIVITIES: CPT

## 2022-07-05 RX ADMIN — ASPIRIN 81 MG: 81 TABLET, CHEWABLE ORAL at 08:45

## 2022-07-05 RX ADMIN — ATORVASTATIN CALCIUM 10 MG: 10 TABLET, FILM COATED ORAL at 20:30

## 2022-07-05 RX ADMIN — ENOXAPARIN SODIUM 40 MG: 100 INJECTION SUBCUTANEOUS at 08:45

## 2022-07-05 RX ADMIN — TRAZODONE HYDROCHLORIDE 50 MG: 50 TABLET ORAL at 20:30

## 2022-07-05 ASSESSMENT — PAIN SCALES - GENERAL
PAINLEVEL_OUTOF10: 2
PAINLEVEL_OUTOF10: 0
PAINLEVEL_OUTOF10: 0

## 2022-07-05 NOTE — PROGRESS NOTES
Speech Language Pathology  [facility]: ACUTE REHAB UNIT  SPEECH-LANGUAGE PATHOLOGY      [x] Daily  [] Weekly Care Conference Note  [] Discharge    Maite Mckeon      OGA:[birth year]  [facility][phone]  Rehab Dx/Hx: Acute CVA (cerebrovascular accident) ([facility] [address] [I63.9]    Precautions: falls  Home situation: lives with brother, manages own finances, did not take any meds PTA, active   ST Dx: [] Aphasia  [x] Dysarthria  [] Apraxia   [] Oropharyngeal dysphagia [x] Cognitive Impairment  [] Other:   Date of Admit: 6/29/2022  Room #: 0166/0166-01    Current functional status (updated daily):         Pt being seen for : [x] Speech/Language Treatment  [] Dysphagia Treatment [x] Cognitive Treatment  [] Other:  Communication: []WFL  [] Aphasia  [x] Dysarthria  [] Apraxia  [] Pragmatic Impairment [] Non-verbal  [] Hearing Loss  [] Other:   Cognition: [] WFL  [x] Mild  [] Moderate  [] Severe [] Unable to Assess  [] Other:  Memory: [] WFL  [x] Mild  [] Moderate  [] Severe [] Unable to Assess  [] Other:  Behavior: [x] Alert  [x] Cooperative  [x]  Pleasant  [] Confused  [] Agitated  [] Uncooperative  [] Distractible [] Motivated  [] Self-Limiting [] Anxious  [] Other:  Endurance:  [x] Adequate for participation in SLP sessions  [] Reduced overall  [] Lethargic  [] Other:  Safety: [x] No concerns at this time  [] Reduced insight into deficits  []  Reduced safety awareness [] Not following call light procedures  [] Unable to Assess  [] Other:    Current Diet Order:ADULT DIET;  Dysphagia - Soft and Bite Sized; Mildly Thick ([place])  ADULT ORAL NUTRITION SUPPLEMENT; Lunch, Dinner; Frozen Oral Supplement  Swallowing Precautions: upright for all intake, stay upright for at least 30 mins after intake, small bites/sips, set-up assistance / distant supervision with intake, alternate bites/sips, slow rate of intake, general aspiration precautions and hold PO and contact SLP if s/s of aspiration or worsening respiratory status develop.           Date: 7/5/2022      Tx session 1  0120-9450 Tx session 2  All goals targeted in session 1   Total Timed Code Min 0    Total Treatment Minutes 60    Individual Treatment Minutes 60    Group Treatment Minutes 0    Co-Treat Minutes 0    Variance/Reason:  0    Pain Denies    Pain Intervention [] RN notified  [] Repositioned  [] Intervention offered and patient declined  [x] N/A  [] Other: [] RN notified  [] Repositioned  [] Intervention offered and patient declined  [x] N/A  [] Other:   Subjective     Pt alert and oriented, cooperative and agreeable to participate in therapy. Pt seen sitting upright in bedside chair. Objective:  Goals     Dysphagia Goals:  Short-term Goals  Timeframe for Short-term Goals: 14 days (7/13/22)    Goal 1. The patient will tolerate recommended diet without observed clinical signs of aspiration. Oral care completed with pt today (brushing teeth, swish and spit concept) tolerating without any overt s/s of aspiration. FWWP implemented:  -pt observed with small single cup sips  -x15 total  -x2 instances of delayed throat clearing and wet vocal quality      Goal 2. The patient/caregiver will demonstrate understanding of compensatory strategies for improved swallowing safety. SLP provided edu provided importance of FFWP, safe swallow strategies, aspiration risk, current diet recommendations. Goal 3. The patient will tolerate instrumental swallowing procedure Ongoing- not clinically warranted at this time. Discussed MBS vs FEES, pt had a previous MBS, but is agreeable to FEES in future. Goal 4: The pt will complete laryngeal/pharyngeal strengthening exercises in 10/10 trials, min cues.     Exercises completed with vital stim/NMES in place:  -placement 3a at 8.0mA for 25 mins  -pt tolerated without any adverse reactions or side effects    -Effortful swallow: x20  -isometric tongue tip hold for 5 secs: x20  -jaw jut hold for 5 secs: x20  -falsetto \"ee\": x20  -CTAR: x20      *New goal added 07/01/22*  Goal 5. The pt will complete OME's for improved oral motor function, coordination, strength, and ROM in 10/10 trials, min cues. Exercises completed with vital stim/NMES in place:  -placement 4a at 7.0mA for 20 mins  -pt tolerated without any adverse reactions or side effects    OME's:  -smile: x30  -pucker: x30  -smile/pucker: x30  -cheek puff hold for 3 seconds: x30  -cheek/eye squint: x30  -pucker to affected side: x30  -lingual protrusion: x30  -lingual lateralization corner to corner of lips: x30      Speech/Lang/Cog goals:  Short-term Goals  Timeframe for Short-term Goals: 14 days (7/13/22)    Goal 1: The pt will complete graded recall tasks with 90% accuracy, min-no cues. Did not target. Goal 2: The pt will complete executive function tasks (i.e. planning, deductive reasoning, inferential, functional organization tasks) with 90% accuracy no cues. Did not target. Goal 3: The pt will complete high-level problem-solving tasks (*particularly related to safety scenarios) with 95% accuracy, no cues. Did not target. Goal 4: The pt will complete articulatory agility tasks with 70% accuracy, mod cues. Did not target. Goal 5: The pt will effectively use compensatory speech strategies during structured speech tasks with 70% accuracy, mod cues. Pt's overall speech judged to be ~50% intelligible throughout conversation today. Pt required mod cues to slow rate and over articulation. Pt stated he becomes frustrated at times, but is learning to adapt to strategies. Other areas targeted: N/A    Education:       Safety Devices: [x] Call light within reach  [x] Chair alarm activated  [] Bed alarm activated  [] Other: [] Call light within reach  [] Chair alarm activated  [] Bed alarm activated  [] Other:    Assessment: Pt pleasant and cooperative, agreeable to participate. Oral care completed with pt, FFWP implemented.  Pt observed with thin liquids via cup x15 sips total. x2 instances of delayed throat clearing and wet vocal quality noted. Pt tolerated vital stim/NMES placement 4a for oral motor function at 7.0mA for 20 mins, and placement 3a for pharyngeal/laryngeal strengthening at 8.0mA for 25 mins, tolerating without any adverse reactions or side effects. Pt completed all strengthening exercises with vital stim in place. Pt required mod cues for use of speech strategies for improved intelligibility today, specifically over articulation and slow rate. Pt aware of speech difficulty, but stated he is learning to use his strategies. Pt remains motivated to improve. Continue goals above. Plan: Continue as per plan of care. Additional Information:     Barriers toward progress: None  Discharge recommendations:  [] Home independently  [x] Home with assistance []  24 hour supervision  [] ECF [] Other:  Continued Tx Upon Discharge: ? [] Yes [] No [x] TBD based on progress while on ARU [] Vital Stim indicated [] Other:   Estimated discharge date: TBD    Interventions used this date:  [x] Speech/Language Treatment  [] Instruction in HEP [] Group [x] Dysphagia Treatment [] Cognitive Treatment   [] Other: Total Time Breakdown / Charges    Time in Time out Total Time / units   Cognitive Tx -- -- --   Speech Tx 1315 1330 15 min/ 1 unit    Dysphagia Tx 1230 1315 45 min/ 1 unit        Electronically Signed by     Francis Wallis. A CCC-SLP  Speech-Language Pathologist  [id number]

## 2022-07-05 NOTE — PROGRESS NOTES
Occupational Therapy  Facility/Department: [address] [place] [address]  Rehabilitation Occupational Therapy Daily Treatment Note    Date: 22  Patient Name: Zohreh Armijo       Room: [facility]6/2798-07  MRN: [id number]  Account: [de-identified]   : 1960  (63 y.o.) Gender: male                    Past Medical History:  has a past medical history of CVA (cerebral vascular accident) ([facility] [address] Past Surgical History:   has no past surgical history on file. Restrictions  Restrictions/Precautions: Fall Risk  Other position/activity restrictions: R sided weakness; up with assist    Subjective  Subjective: Pt up in chair upon arrival or OT/PT for co tx, reporting some R sided pain d/t \"sleeping funny\"  Restrictions/Precautions: Fall Risk             Objective     Cognition  Arousal/Alertness: Appropriate responses to stimuli  Following Commands: Follows one step commands with repetition; Follows one step commands consistently  Attention Span: Attends with cues to redirect  Memory: Appears intact  Safety Judgement: Decreased awareness of need for assistance;Decreased awareness of need for safety  Problem Solving: Assistance required to generate solutions;Assistance required to identify errors made  Insights: Decreased awareness of deficits  Initiation: Requires cues for some  Sequencing: Requires cues for some  Cognition Comment: impulsive tendencies; decreased awareness of RUE during mobility  Orientation  Overall Orientation Status: Within Functional Limits  Orientation Level: Oriented to time;Oriented to situation;Oriented to person;Oriented to place   Perception  Overall Perceptual Status: Impaired  Unilateral Attention: Cues to attend to right side of body;Cues to maintain midline in sitting;Cues to maintain midline in standing  Initiation: Cues to initiate tasks  Motor Planning: Cues to use objects appropriately                Functional Mobility  Device: Rolling walker (with R clam shell)  Activity: To/From therapy gym  Assistance Level: Minimal assistance  Skilled Clinical Factors: cues for hand placement and R side attention, cues for safety during transfers  Transfers  Surface: To mat;From mat; To chair with arms;From chair with arms  Additional Factors: Set-up; Verbal cues; Increased time to complete;Hand placement cues  Device: Walker (R clamshell)  Sit to Stand  Assistance Level: Moderate assistance; Requires x 2 assistance  Stand to Sit  Assistance Level: Minimal assistance; Requires x 2 assistance  Skilled Clinical Factors: cues for safety   Neuromuscular Education  Neuromuscular education: Yes  NDT Treatment: Gait ;Upper extremity;Standing;Sitting  Weight Bearing  Weight Bearing Technique: Yes  RUE Weight Bearing: Extended arm standing;Forearm standing  LUE Weight Bearing: Forearm standing  Response To Weight Bearing Technique: good response to WB, R hand WB into clam shell/functional flexion of hand while in stance with arm extended; In stance with PT faciliating posturing and Leg extension, OT facilitating WB through forearms onto Red yoga ball while flexing at hips forward x5, to R x5 and to L x5; verbal and tactile cues to maintain midline in stance, pt with R side lean as he fatigued     Assessment  Assessment  Assessment: Co-tx collaboration this date to safely meet goals and will have better occupational performance outcomes with in a co-treatment than 1:1 session. Marcial Jessica is progressing in therapy steadily, currently limited by R hemiparesis and safety awareness. Demo'd poor safety awareness, requiring consistent cueing during transfers and mobility. Functional Mobility: Grossly Min A x1 for ambulation with RW and R slam shell, Mod A x2 for sit to stand  TA: In stance to grasp/release bean bags onto corresponding colored dot with R hand crossing midline; x15 hip flexion in stance with WB onto forearms in different planes with yoga ball on mat table   Activity was tolerated well, pt required no undue rest breaks. Continue OT POC to address performance deficits in ADLs and functional mobility. Activity Tolerance: Patient tolerated treatment well  Discharge Recommendations: Home with Home health OT;24 hour supervision or assist  OT Equipment Recommendations  Equipment Needed: Yes  Mobility Devices: ADL Assistive Devices; [place]; Wheelchair  Walker: Rolling (R clam shell on RW)  Wheelchair: Standard; Wheelchair Cushion Standard  ADL Assistive Devices: Toileting - 3-in-1 Commode;Grab Bars - shower; Shower Chair with back; Reacher;Sock-Aid Hard;Elastic Shoe Laces  Other: Pt will benefit from a BSC to be used in the close confinements of the bedroom overnight to increase independence and safety during toileting considering pt's R sided hemiparesis and decreased dynamic balance. Safety Devices  Safety Devices in place: Yes  Type of devices: Call light within reach; Chair alarm in place; Left in chair; All fall risk precautions in place; Patient at risk for falls;Gait belt  Restraints  Initially in place: No    Patient Education  Education  Education Given To: Patient  Education Provided: Role of Therapy; ADL Function;Equipment; Mobility Training;Transfer Training;Energy Conservation; Safety;Precautions;Plan of Care;DME/Home Modifications  Education Provided Comments: disease specific: safety with R arm  Education Method: Verbal;Demonstration  Barriers to Learning: Cognition  Education Outcome: Verbalized understanding;Continued education needed    Plan  Plan  Times per Week: 5 of 7 days  Times per Day: Daily  Specific Instructions for Next Treatment: Box and Blocks assessment  Current Treatment Recommendations: Strengthening;Balance training;Functional mobility training; Endurance training;Neuromuscular re-education;Positioning;Equipment evaluation, education, & procurement; Modalities; Patient/Caregiver education & training; Safety education & training;Self-Care / ADL; Home management training;Cognitive/Perceptual training;Coordination training;Sensory integraion    Goals  Patient Goals   Patient goals : \"to get back to normal\"   Short Term Goals  Time Frame for Short term goals: within 7 days by 7/6/22   Short Term Goal 1: Pt will perform toilet transfer with Mod A and LRAD   Short Term Goal 2: Pt will perform LB dressing with Min A and AE PRN   Short Term Goal 3: Pt will perform UB dressing with Min A and appropriate katlyn techniques   Short Term Goal 4: Pt will perform functional dynamic balance activity in stance for >5 minutes with Min A and LRAD   Short Term Goal 5: Pt will perform 2-3 grooming tasks with Min A for during tasks standing vs sitting with LRAD and Min A for balance     Long Term Goals  Time Frame for Long term goals : within 21 days by 7/20/22  Long Term Goal 1: Pt will perform TB dressing Mod I   Long Term Goal 2: Pt will perfrom TB bathing Mod I and AE PRN   Long Term Goal 3: Pt will perform simple IADL task in stance with LRAD for >7 minutes with supervision   Long Term Goal 4: Pt will self feed with R hand with Min A   Long Term Goal 5: Add goal after objective R vs L hand assessment completed with  strength, box and blocks, etc.       Therapy Time   Individual Concurrent Group Co-treatment   Time In       0900   Time Out       0930   Minutes       30   Timed Code Treatment Minutes: 30 Minutes       Radhaine Cons, OT

## 2022-07-05 NOTE — PLAN OF CARE
No new skin breakdown noted at this time. Patient is able to ambulate and shift in bed/chair with minimal assist from staff. Bony prominences have slight blanchable redness.  Marta Velasco RN

## 2022-07-05 NOTE — PROGRESS NOTES
Occupational Therapy  Facility/Department: [facility] [place]  Rehabilitation Occupational Therapy Daily Treatment Note    Date: 22  Patient Name: Justin Pabon       Room: 9014/5890-58  MRN: [id number]  Account: [de-identified]   : 1960  (63 y.o.) Gender: male                    Past Medical History:  has a past medical history of CVA (cerebral vascular accident) ([facility] [address] Past Surgical History:   has no past surgical history on file. Restrictions  Restrictions/Precautions: Fall Risk  Other position/activity restrictions: R sided weakness; up with assist    Subjective  Subjective: Pt up in chair and agreeable to therapy, no pain  Restrictions/Precautions: Fall Risk             Objective     Cognition  Overall Cognitive Status: Exceptions  Arousal/Alertness: Appropriate responses to stimuli  Following Commands: Follows one step commands with repetition; Follows one step commands consistently  Attention Span: Attends with cues to redirect  Memory: Appears intact  Safety Judgement: Decreased awareness of need for assistance;Decreased awareness of need for safety  Problem Solving: Assistance required to generate solutions;Assistance required to identify errors made  Insights: Decreased awareness of deficits  Initiation: Requires cues for some  Sequencing: Requires cues for some  Cognition Comment: impulsive tendencies; decreased awareness of RUE during mobility  Orientation  Overall Orientation Status: Within Functional Limits  Orientation Level: Oriented to time;Oriented to situation;Oriented to person;Oriented to place         ADL  Grooming/Oral Hygiene  Assistance Level: Supervision;Set-up  Skilled Clinical Factors: pt completed seated in chair; pt able to complete all parts              OT Exercises  Exercise Treatment: seated for AAROM, completed table top activities with RUE and wash cloth, some assist with R elbow and forearm support.  Improved ability with shoulder extension but required assit with flexion Strengthening;Balance training;Functional mobility training; Endurance training;Neuromuscular re-education;Positioning;Equipment evaluation, education, & procurement; Modalities; Patient/Caregiver education & training; Safety education & training;Self-Care / ADL; Home management training;Cognitive/Perceptual training;Coordination training;Sensory integraion    Goals  Patient Goals   Patient goals : \"to get back to normal\" (Simultaneous filing. User may not have seen previous data.)  Short Term Goals  Time Frame for Short term goals: within 7 days by 7/6/22 (Simultaneous filing. User may not have seen previous data.)  Short Term Goal 1: Pt will perform toilet transfer with Mod A and LRAD (Simultaneous filing. User may not have seen previous data.)  Short Term Goal 2: Pt will perform LB dressing with Min A and AE PRN (Simultaneous filing. User may not have seen previous data.)  Short Term Goal 3: Pt will perform UB dressing with Min A and appropriate katlyn techniques (Simultaneous filing. User may not have seen previous data.)  Short Term Goal 4: Pt will perform functional dynamic balance activity in stance for >5 minutes with Min A and LRAD (Simultaneous filing. User may not have seen previous data.)  Short Term Goal 5: Pt will perform 2-3 grooming tasks with Min A for during tasks standing vs sitting with LRAD and Min A for balance (Simultaneous filing. User may not have seen previous data.)  Long Term Goals  Time Frame for Long term goals : within 21 days by (Simultaneous filing. User may not have seen previous data.)  Long Term Goal 1: Pt will perform TB dressing Mod I (Simultaneous filing. User may not have seen previous data.)  Long Term Goal 2: Pt will perfrom TB bathing Mod I and AE PRN (Simultaneous filing. User may not have seen previous data.)  Long Term Goal 3: Pt will perform simple IADL task in stance with LRAD for >7 minutes with supervision (Simultaneous filing.  User may not have seen previous data.)  Long

## 2022-07-05 NOTE — PROGRESS NOTES
[facility]  7/5/2022  [id number]    Chief Complaint: Acute CVA (cerebrovascular accident) [facility])    Subjective:   No acute events overnight. Today Alis Son is seen in his room, napping. He reports improvement in his right arm strength. He denies acute complaints at this time. ROS: denies f/c, n/v, cp, sob    Objective:  Patient Vitals for the past 24 hrs:   BP Temp Temp src Pulse Resp SpO2   07/05/22 0946 102/66 -- -- 87 -- 99 %   07/05/22 0904 102/66 -- -- 87 -- 99 %   07/05/22 0843 107/69 97.6 °F (36.4 °C) Oral 92 16 94 %   07/04/22 1930 105/71 98.3 °F (36.8 °C) Oral 86 16 97 %     Gen: No distress, pleasant. Asleep in chair  HEENT: Normocephalic, atraumatic. CV: extremities well perfused  Resp: No respiratory distress. Abd: Soft, nontender   Ext: No edema. Neuro: Alert, oriented, appropriately interactive. Wt Readings from Last 3 Encounters:   06/29/22 145 lb 15.1 oz (66.2 kg)   02/14/22 145 lb (65.8 kg)       Laboratory data:   Lab Results   Component Value Date    WBC 5.5 07/04/2022    HGB 15.5 07/04/2022    HCT 44.6 07/04/2022    .3 (H) 07/04/2022     07/04/2022       Lab Results   Component Value Date/Time     07/04/2022 08:34 AM    K 4.3 07/04/2022 08:34 AM    CL 99 07/04/2022 08:34 AM    CO2 25 07/04/2022 08:34 AM    BUN 14 07/04/2022 08:34 AM    CREATININE 0.7 07/04/2022 08:34 AM    GLUCOSE 173 07/04/2022 08:34 AM    CALCIUM 10.1 07/04/2022 08:34 AM        Therapy progress:  PT  Position Activity Restriction  Other position/activity restrictions: R sided weakness; up with assist  Objective     Sit to Stand:  Moderate Assistance  Stand to sit: Moderate Assistance  Bed to Chair: Moderate assistance (to L with cues for safety)  Device: Parallel Bars  Assistance: Minimal assistance  Distance: 8' and 20'  OT  PT Equipment Recommendations  Equipment Needed: No  Toilet - Technique: Ambulating  Equipment Used: Standard toilet (with L grab bar)  Toilet Transfers Comments: cues for safe hand placement; decreased proprioception when going to initate sit down to toilet  Assessment        SLP  Current Diet : Soft and Bite-Sized             Body mass index is 18.74 kg/m². Assessment and Plan:  Miryam Rainey is a 60year old male with a past medical history significant for left sided stroke due to ICA dissection (2018) without residual deficits, HLD, and nicotine dependence who presented to  on 6/26/22 with acute onset of right face, arm, and leg weakness, found to have acute CVA. He was admitted to [facility] on 6/29/22 due to functional deficits below his baseline.      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 Follow bowel movements. Enema or suppository if needed.      Bladder: Check PVR x 3. [facility] if PVR > 200ml or if any volume is > 500 ml.      Sleep: Trazodone provided prn.      Follow up appointments: PCP, Stroke clinic    Trinity Randolph MD 7/5/2022, 5:41 PM

## 2022-07-05 NOTE — PROGRESS NOTES
Physical Therapy  Facility/Department: [address] [place] [address]  Rehabilitation Physical Therapy Treatment Note    NAME: Jammie Duty  : 1960 (63 y.o.)  MRN: [id number]  CODE STATUS: Full Code    Date of Service: 22     Restrictions:  Restrictions/Precautions: Fall Risk  Position Activity Restriction  Other position/activity restrictions: R sided weakness; up with assist     SUBJECTIVE  Subjective  Subjective: Pt agreeable to therapy. Up in chair upon arrival.  Pain: Reports 3/10 pain in R shoulder        OBJECTIVE     Functional Mobility  Balance  Sitting Balance: Supervision  Standing Balance: Minimal assistance  Transfers  Device: Walker  Sit to Stand  Assistance Level: Moderate assistance  Skilled Clinical Factors: verbal cues for sequencing and RLE placement  Stand to Sit  Assistance Level: Minimal assistance  Skilled Clinical Factors: cues for safety      Environmental Mobility  Ambulation  Surface: Level surface  Device: Rolling walker (with R clamshell)  Distance: 120' x2 + short distances in gym  Assistance Level: Minimal assistance  Gait Deviations: Decreased step length right;Decreased heel strike right; Unsteady gait; Path deviations; Slow hortencia;Decreased weight shift bilateral  Skilled Clinical Factors: cues for weight shifting to L and increased R foot clearance during swing phase on R  Stairs  Stair Height: 6''  Device: One handrail (L)  Number of Stairs: 4  Additional Factors: Verbal cues; Hand placement cues; Non-reciprocal going up;Non-reciprocal going down; Increased time to complete  Assistance Level: Minimal assistance  Skilled Clinical Factors: cues for safety and sequencing stairs    PT Exercises  Dynamic Standing Balance Exercises: dynamic standing activity at table top with dynamic reaching outside SERENE x3 minutes with CGA;  Forward ball rolling on mat with red therapy ball - pt using BUEs on ball to roll ball forward x5 and diagonally to R and L x5 each with min A and cues to prevent R knee from buckling. 2nd session:  Pt up in chair upon arrival. Agreeable to therapy. Pt reports pain but does not rate. Sit-stands with mod A from a variety of surfaces with cues for technique and hand placement. Ambulation: 125' + short distances in gym with RW and clamshell on R, partial step through gait pattern, tactile cues to facilitate weight shifting to L to improve R foot clearance during swing phase  Dynamic balance: sidestepping R and L in parallel bars with focus on R foot clearance and step height; Forward and sidestepping up onto 4\" step with RLE with good foot clearance and accuracy x10 reps; Forward toe taps onto cone with RLE x5 reps with fair accuracy. Pt left sitting up in chair with all needs in reach and chair alarm on. ASSESSMENT/PROGRESS TOWARDS GOALS  Vital Signs  Heart Rate: 87  BP: 102/66  MAP (Calculated): 78  SpO2: 99 %    Assessment  Assessment: Pt continues to demonstrate improvement with gait training and strength in RLE; pt is grossly min/mod A. Verbal and tactile cues for safety with transfers and ambulation. Will benefit from continued therapy to address current limitations and maximize independence.   Activity Tolerance: Patient tolerated treatment well  Discharge Recommendations: 24 hour supervision or assist;Patient would benefit from continued therapy after discharge  PT Equipment Recommendations  Equipment Needed: No    Goals  Patient Goals   Patient goals : \"to be able to walk again and use my R arm\"  Short Term Goals  Time Frame for Short term goals: 10 days (7/9/22)  Short term goal 1: Pt will perform bed mobility with min A  -7/05 NT  Short term goal 2: Pt will perform transfers with min A  -7/05: not met; fluctuates b/w Min and mod A  Short term goal 3: Pt will ambulate 22' with quad cane and mod A  -7/05 Goal met  Short term goal 4: Pt will negotiate 4 steps with HR and mod A  -7/05 Goal met  Long Term Goals  Time Frame for Long term goals : 21 days (7/20/22)  Long term goal 1: Pt will perform bed mobility mod I  Long term goal 2: Pt will perform transfers with supervision  Long term goal 3: Pt will ambulate 48' with quad cane and CGA 7/05 update goal: Pt will ambulate 150' with RW and clamshell with supervision  Long term goal 4: Pt will propel wheelchair 150' mod I; 7/05 discontinue goal  Long term goal 5: Pt will negotiate 4 steps with HR and min A    PLAN OF CARE/SAFETY  Plan  Plan:  minutes of therapy at least 5 out of 7 days a week  Plan weeks: 21 days  Specific Instructions for Next Treatment: R katlyn strength training and continue mobility progression  Current Treatment Recommendations: Strengthening;ROM;Balance training;Functional mobility training;Transfer training; Endurance training; Wheelchair mobility training;Gait training;Stair training;Neuromuscular re-education;Manual Therapy - Soft Tissue Mobilization;Patient/Caregiver education & training; Safety education & training;Home exercise program;Equipment evaluation, education, & procurement; Modalities; Therapeutic activities;ADL/Self-care training;IADL training  Safety Devices  Type of Devices: All fall risk precautions in place;Call light within reach; Chair alarm in place;Gait belt;Nurse notified; Left in chair;Patient at risk for falls    EDUCATION  Education  Education Given To: Patient  Education Provided: Role of Therapy; Safety;Equipment; Mobility Training; Fall Prevention Strategies;Transfer Training;Plan of Care;Precautions; Home Exercise Program  Education Provided Comments: Safety with gait and transfers  Education Method: Demonstration;Verbal  Barriers to Learning: Cognition  Education Outcome: Verbalized understanding;Continued education needed    Therapy Time   Individual Concurrent Group Co-treatment   Time In       0900   Time Out       0930   Minutes       30     Timed Code Treatment Minutes: 30 Minutes      Second Session Therapy Time:   Individual Concurrent Group Co-treatment   Time In 1330        Time Out 1400        Minutes 30          Timed Code Treatment Minutes:  30    Total Treatment Minutes:  Esvin PT, 07/05/22 at 9:58 AM

## 2022-07-05 NOTE — PATIENT CARE CONFERENCE
[facility]  Inpatient Rehabilitation  Weekly Team Conference Note    Date: 2022  Patient Name: Karo Henderson        MRN: [id number]    : 1960  (60 y.o.)  Gender: male   Referring Practitioner: Akilah Solorzano MD  Diagnosis: acute CVA      Interventions to be utilized toward barriers to discharge, per discipline:  [address] observed barriers to dc: Limited family support, Communication deficit, Lower extremity weakness and Medication managment  Nursing interventions:  Family Education: no  Fall Risk:  Yes      PHYSICAL THERAPY  Physical therapy observed barriers to dc:    Baseline: independent without device   Current level: mod A for sit-stands, ambulating 150' with RW and clamshell and min A; min A for stair negotiation   Barriers to DC: R sided weakness, impaired safety awareness, decreased high level balance   Needs in order to achieve dc home/next level of care: will need 24hr assist; clamshell attachment for RW      Physical therapy interventions:   Current Treatment Recommendations: Strengthening,ROM,Balance training,Functional mobility training,Transfer training,Endurance training,Wheelchair mobility training,Gait training,Stair training,Neuromuscular re-education,Manual Therapy - Soft Tissue Mobilization,Patient/Caregiver education & training,Safety education & training,Home exercise program,Equipment evaluation, education, & procurement,Modalities,Therapeutic activities,ADL/Self-care training,IADL training    PT Goals:            Short Term Goals  Time Frame for Short term goals: 10 days (22)  Short term goal 1: Pt will perform bed mobility with min A  -7/05 NT  Short term goal 2: Pt will perform transfers with min A  -7/05: not met; fluctuates b/w Min and mod A  Short term goal 3: Pt will ambulate 22' with quad cane and mod A  -7/05 Goal met  Short term goal 4: Pt will negotiate 4 steps with HR and mod A  -7/05 Goal met            Long Term Goals  Time Frame for Long term goals : 21 days (7/20/22)  Long term goal 1: Pt will perform bed mobility mod I  Long term goal 2: Pt will perform transfers with supervision  Long term goal 3: Pt will ambulate 48' with quad cane and CGA 7/05 update goal: Pt will ambulate 150' with RW and clamshell with supervision  Long term goal 4: Pt will propel wheelchair 150' mod I; 7/05 discontinue goal  Long term goal 5: Pt will negotiate 4 steps with HR and min A    PT Assessment:    Assessment  Assessment: Pt continues to demonstrate improvement with gait training and strength in RLE; pt is grossly min/mod A. Verbal and tactile cues for safety with transfers and ambulation. Will benefit from continued therapy to address current limitations and maximize independence. Activity Tolerance: Patient tolerated treatment well  Discharge Recommendations: 24 hour supervision or assist;Patient would benefit from continued therapy after discharge  PT Equipment Recommendations  Equipment Needed: No    OCCUPATIONAL THERAPY  Occupational therapy observed barriers to dc:    Baseline: independent with no AD   Current level: Mod A x2 for sit to stand, Min A with clam shell on RW for mobility, Set up for oral care, Mod A for dressing, Max A for footwear   Barriers to DC: R sided weakness, safety awareness, high level of A req for ADLs and transfers    Needs in order to achieve dc home/next level of care: confirm 24/7 supervision at home (per pt report he lives with his brother); Toileting - 3-in-1 Commode;Grab Bars - shower; Shower Chair with back;  Reacher; Sock-Aid Hard; Elastic Shoe Laces    Occupational Therapy interventions:  Current Treatment Recommendations: Strengthening,Balance training,Functional mobility training,Endurance training,Neuromuscular re-education,Positioning,Equipment evaluation, education, & procurement,Modalities,Patient/Caregiver education & training,Safety education & training,Self-Care / ADL,Home management training,Cognitive/Perceptual training,Coordination training,Sensory integraion    OT Goals:  Patient Goals   Patient goals : \"to get back to normal\" (Simultaneous filing. User may not have seen previous data.)  Short Term Goals  Time Frame for Short term goals: within 7 days by 7/6/22 (Simultaneous filing. User may not have seen previous data.)  Short Term Goal 1: Pt will perform toilet transfer with Mod A and LRAD (Simultaneous filing. User may not have seen previous data.)  Short Term Goal 2: Pt will perform LB dressing with Min A and AE PRN (Simultaneous filing. User may not have seen previous data.)  Short Term Goal 3: Pt will perform UB dressing with Min A and appropriate katlyn techniques (Simultaneous filing. User may not have seen previous data.)  Short Term Goal 4: Pt will perform functional dynamic balance activity in stance for >5 minutes with Min A and LRAD (Simultaneous filing. User may not have seen previous data.)  Short Term Goal 5: Pt will perform 2-3 grooming tasks with Min A for during tasks standing vs sitting with LRAD and Min A for balance (Simultaneous filing. User may not have seen previous data.)  Long Term Goals  Time Frame for Long term goals : within 21 days by (Simultaneous filing. User may not have seen previous data.)  Long Term Goal 1: Pt will perform TB dressing Mod I (Simultaneous filing. User may not have seen previous data.)  Long Term Goal 2: Pt will perfrom TB bathing Mod I and AE PRN (Simultaneous filing. User may not have seen previous data.)  Long Term Goal 3: Pt will perform simple IADL task in stance with LRAD for >7 minutes with supervision (Simultaneous filing. User may not have seen previous data.)  Long Term Goal 4: Pt will self feed with R hand with Min A (Simultaneous filing. User may not have seen previous data.)  Long Term Goal 5: Add goal after objective R vs L hand assessment completed with  strength, box and blocks, etc. (Simultaneous filing.  User may not have seen previous data.)    OT Assessment: [place] Sousa is progressing in therapy steadily, currently limited by R hemiparesis and safety awareness. Demo'd poor safety awareness, requiring consistent cueing during transfers and mobility. Functional Mobility: Grossly Min A x1 for ambulation with RW and R slam shell, Mod A x2 for sit to stand  TA: In stance to grasp/release bean bags onto corresponding colored dot with R hand crossing midline; x15 hip flexion in stance with WB onto forearms in different planes with yoga ball on mat table   Activity was tolerated well, pt required no undue rest breaks. Continue OT POC to address performance deficits in ADLs and functional mobility. SPEECH THERAPY  Speech therapy observed barriers to dc:    Baseline: lives with brother, manages own finances, was not taking any meds PTA, active    Current level: mod-severe dysarthria, mod oropharyngeal dysphagia, mild high level cog    Barriers to DC: reduced speech intelligibility    Needs in order to achieve dc home/next level of care: carryover of compensatory strategies for improved speech intelligibility, tolerance of LRD, carryover of compensatory strategies for improved cognition    Speech Therapy interventions:  Dysphagia: Diet tolerance monitoring, safe swallow strategies, therapeutic PO trials with SLP, swallow strengthening exercises, vital stim/NMES  Speech/Language/Cognition: Compensatory strategy training and carryover, recall/STM, problem solving, reasoning, exec function, thought organization, attention. Dysphagia Goals:  Timeframe for Long-term Goals: 21 days (7/20/22)  Goal 1: The pt will tolerate safest and least restrictive diet without clinical s/s of aspiration or change in respiratory status. 07/05: progressing, ongoing     Short-term Goals  Timeframe for Short-term Goals: 14 days (7/13/22)  Goal 1: The pt will complete laryngeal/pharyngeal strengthening exercises in 10/10 trials, min cues.  07/05: progressing, ongoing  Goal 2. The patient will tolerate recommended diet without observed clinical signs of aspiration. 07/05: progressing, ongoing  Goal 3. The patient/caregiver will demonstrate understanding of compensatory strategies for improved swallowing safety. 07/05: progressing, ongoing  Goal 4. The patient will tolerate instrumental swallowing procedure. 07/05: not clinically warranted at this time, ongoing- will complete prior to d/c  Goal 5. The pt will complete OME's for improved oral motor function, coordination, strength, and ROM in 10/10 trials, min cues. 07/05: progressing, ongoing    Speech/Language/Cog Goals:  Timeframe for Long-term Goals: 21 days (7/20/22)  Goal 1: The pt will effectively use reviewed compensatory strategies for improved safety and independence upon d/c.07/05: progressing, ongoing  Goal 2: The pt will effectively use reviewed compensatory speech strategies for improved speech intelligibility in conversation with unfamiliar listener. 07/05: progressing, ongoing     Short-term Goals  Timeframe for Short-term Goals: 14 days (7/13/22)  Goal 1: The pt will complete graded recall tasks with 90% accuracy, min-no cues. 07/05: progressing, ongoing  Goal 2: The pt will complete executive function tasks (i.e. planning, deductive reasoning, inferential, functional organization tasks) with 90% accuracy no cues. 07/05: progressing, ongoing  Goal 3: The pt will complete high-level problem-solving tasks (*particularly related to safety scenarios) with 95% accuracy, no cues. 07/05: progressing, ongoing  Goal 4: The pt will complete articulatory agility tasks with 70% accuracy, mod cues. 07/05: progressing, ongoing  Goal 5: The pt will effectively use compensatory speech strategies during structured speech tasks with 70% accuracy, mod cues. 07/05: progressing, ongoing  Timeframe for Short-term Goals: 14 days (7/13/22)    ST Assessment:   Pt pleasant and cooperative, agreeable to participate.  Oral care completed with pt, FFWP implemented. Pt observed with thin liquids via cup x15 sips total. x2 instances of delayed throat clearing and wet vocal quality noted. Pt tolerated vital stim/NMES placement 4a for oral motor function at 7.0mA for 20 mins, and placement 3a for pharyngeal/laryngeal strengthening at 8.0mA for 25 mins, tolerating without any adverse reactions or side effects. Pt completed all strengthening exercises with vital stim in place. Pt required mod cues for use of speech strategies for improved intelligibility today, specifically over articulation and slow rate. Pt aware of speech difficulty, but stated he is learning to use his strategies. Pt remains motivated to improve. NUTRITION  Weight: 145 lb 15.1 oz (66.2 kg) (66.2 Kg) / Body mass index is 18.74 kg/m². Diet Order: ADULT DIET; Dysphagia - Soft and Bite Sized; Mildly Thick ([place] [address])  ADULT ORAL NUTRITION SUPPLEMENT; Lunch, Dinner; Frozen Oral Supplement  PO Meals Eaten (%): 76 - 100%  Education: No recommendation at this time    CASE MANAGEMENT  Assessment:58 yr old male. DxAcute CVA (cerebrovascular accident). Independent PLOF. Lives with brother in a 2 level home with a level entry into home from the back. Patient owns a rolling walker, cane and manual wheel chair. Therapy recommendations are 24 hour supervision or assist;Patient would benefit from continued therapy after discharge. Patient has [employer] for insurance, will have to go through them for home care approval.        Interdisciplinary Goals:   1.) Pt will perform functional dynamic balance activity in stance for >5 minutes with Min A and LRAD  2.) Pt will ambulate 150' with RW and CGA  3.) Pt will carryover use of speech strategies for improved speech intelligibility across all disciplines given min cues       [x]  Family Training discussed at conference and to be scheduled.       Discharge Plan   Estimated discharge date: 7/22/2022  Destination: Home health vs OP  Pass:No Services at Discharge: Home health vs OP PT/OT/ST  Equipment at Discharge: Shower chair, and RW with clam shell    Team Members Present at Conference:  : Justina Osei    Occupational Therapist: Dayna Garcia, OT, OTR/L   Physical Therapist: China Johansen, PETE  Speech Therapist: Nikki Reddy, [facility] SLP   Nurse: Ele Hernandez, RN  Dietician: Hue Lopez RD, LD   : Delaney Collins, OTR/L  Psychiatry: N/A    Family members present at conference: No      I led this team conference and I approve the established interdisciplinary plan of care as documented within the medical record of [facility].     MD: Electronically signed by Luigi Vasquez MD on 7/6/2022 at 3:01 PM

## 2022-07-06 PROCEDURE — 6360000002 HC RX W HCPCS: Performed by: STUDENT IN AN ORGANIZED HEALTH CARE EDUCATION/TRAINING PROGRAM

## 2022-07-06 PROCEDURE — 97535 SELF CARE MNGMENT TRAINING: CPT

## 2022-07-06 PROCEDURE — 92507 TX SP LANG VOICE COMM INDIV: CPT

## 2022-07-06 PROCEDURE — 6370000000 HC RX 637 (ALT 250 FOR IP): Performed by: STUDENT IN AN ORGANIZED HEALTH CARE EDUCATION/TRAINING PROGRAM

## 2022-07-06 PROCEDURE — 97112 NEUROMUSCULAR REEDUCATION: CPT

## 2022-07-06 PROCEDURE — 92526 ORAL FUNCTION THERAPY: CPT

## 2022-07-06 PROCEDURE — 1280000000 HC REHAB R&B

## 2022-07-06 PROCEDURE — 97116 GAIT TRAINING THERAPY: CPT

## 2022-07-06 RX ADMIN — ENOXAPARIN SODIUM 40 MG: 100 INJECTION SUBCUTANEOUS at 10:08

## 2022-07-06 RX ADMIN — TRAZODONE HYDROCHLORIDE 50 MG: 50 TABLET ORAL at 21:24

## 2022-07-06 RX ADMIN — ASPIRIN 81 MG: 81 TABLET, CHEWABLE ORAL at 10:09

## 2022-07-06 RX ADMIN — ATORVASTATIN CALCIUM 10 MG: 10 TABLET, FILM COATED ORAL at 21:24

## 2022-07-06 ASSESSMENT — PAIN SCALES - GENERAL
PAINLEVEL_OUTOF10: 0

## 2022-07-06 NOTE — PROGRESS NOTES
Speech Language Pathology  [facility]: ACUTE REHAB UNIT  SPEECH-LANGUAGE PATHOLOGY      [x] Daily  [] Weekly Care Conference Note  [] Discharge    Austin Jalloh      DXY:1/30/[birth year]23  HIA:[id number]  Rehab Dx/Hx: Acute CVA (cerebrovascular accident) ([facility] [address] [I63.9]    Precautions: falls  Home situation: lives with brother, manages own finances, did not take any meds PTA, active   ST Dx: [] Aphasia  [x] Dysarthria  [] Apraxia   [] Oropharyngeal dysphagia [x] Cognitive Impairment  [] Other:   Date of Admit: 6/29/2022  Room #: 0166/0166-01    Current functional status (updated daily):         Pt being seen for : [x] Speech/Language Treatment  [] Dysphagia Treatment [x] Cognitive Treatment  [] Other:  Communication: []WFL  [] Aphasia  [x] Dysarthria  [] Apraxia  [] Pragmatic Impairment [] Non-verbal  [] Hearing Loss  [] Other:   Cognition: [] WFL  [x] Mild  [] Moderate  [] Severe [] Unable to Assess  [] Other:  Memory: [] WFL  [x] Mild  [] Moderate  [] Severe [] Unable to Assess  [] Other:  Behavior: [x] Alert  [x] Cooperative  [x]  Pleasant  [] Confused  [] Agitated  [] Uncooperative  [] Distractible [] Motivated  [] Self-Limiting [] Anxious  [] Other:  Endurance:  [x] Adequate for participation in SLP sessions  [] Reduced overall  [] Lethargic  [] Other:  Safety: [x] No concerns at this time  [] Reduced insight into deficits  []  Reduced safety awareness [] Not following call light procedures  [] Unable to Assess  [] Other:    Current Diet Order:ADULT DIET;  Dysphagia - Soft and Bite Sized; Mildly Thick ([place])  ADULT ORAL NUTRITION SUPPLEMENT; Lunch, Dinner; Frozen Oral Supplement  Swallowing Precautions: upright for all intake, stay upright for at least 30 mins after intake, small bites/sips, set-up assistance / distant supervision with intake, alternate bites/sips, slow rate of intake, general aspiration precautions and hold PO and contact SLP if s/s of aspiration or worsening respiratory status develop.           Date: 7/6/2022      Tx session 1  0930 - 1000  Callie Paz MA CCC-SLP Tx session 2  1400 - 1430  Larisa Mcadams CCC-SLP   Total Timed Code Min 0 0   Total Treatment Minutes 30 30   Individual Treatment Minutes 30 30   Group Treatment Minutes 0 0   Co-Treat Minutes 0 0   Variance/Reason:  0 0   Pain Denies Denies   Pain Intervention [] RN notified  [] Repositioned  [] Intervention offered and patient declined  [x] N/A  [] Other: [] RN notified  [] Repositioned  [] Intervention offered and patient declined  [x] N/A  [] Other:   Subjective     Pt alert and cooperative, agreeable to tx. Pt upright in bedside chair for session. Pt alert and oriented, cooperative and agreeable to participate in therapy. Pt seen sitting upright in bedside chair for tx session. Objective:  Goals     Dysphagia Goals:  Short-term Goals  Timeframe for Short-term Goals: 14 days (7/13/22)    Goal 1. The patient will tolerate recommended diet without observed clinical signs of aspiration. Pt reported completing oral care with OT prior to SLP entering room. FFWP:  -pt required min cues to take small, single sip via cup  -x15 total  -pt with intermittent wet vocal quality and throat clearing  -1x immediate post-swallow cough; suspect larger sip    NTL via cup:  -no overt s/s of aspiration    Goal 2. The patient/caregiver will demonstrate understanding of compensatory strategies for improved swallowing safety. SLP provided edu re: FFWP, safe swallow strategies - small sips, slow rate. Pt verbalized understanding, but benefited from min cues. SLP provided edu re: thickened liquids, FFWP, current diet recs    Goal 3. The patient will tolerate instrumental swallowing procedure Ongoing- not clinically warranted at this time. Ongoing-not clinically warranted at this time, will complete in future. Goal 4: The pt will complete laryngeal/pharyngeal strengthening exercises in 10/10 trials, min cues. slow, loud, over-exaggerate, breath support. Pt benefited from mod cues in order to use during conversational speech. Pt's overall speech judged to be ~50% intelligible today, requiring mod cues for use of speech strategies. Pt aware of reduced intelligibility, asking several times. \"how am I doing with my strategies and speech\"      Other areas targeted: N/a    Education:   SLP edu pt re: SLOB speech strategies, safe swallow strategies, FFWP, rationale of exercises  edu provided re: ongoing use of speech strategies for improved intelligibility, safe swallow strategies, importance of s   Safety Devices: [x] Call light within reach  [x] Chair alarm activated  [] Bed alarm activated  [] Other: [x] Call light within reach  [x] Chair alarm activated  [] Bed alarm activated  [] Other:    Assessment: Tx session 1: Pt alert and cooperative, agreeable to tx. Edu re: SLOB speech strategies - mod cues to implement during conversation. OMEs completed this date given min cues. Pt also benefited from min cues to complete pharyngeal / laryngeal strengthening exercises. Pt with increased difficulty completing effortful swallow this date; pumping noted, as well as multiple attempts to initiate. Pt benefited from min cues to take small, single sips of thin liquid for FFWP. Pt with intermittent wet vocal quality and throat clearing. 1x immediate post-swallow cough. Continue goals above. Tx session 2: Pt pleasant and cooperative, agreeable to participate. Pt tolerated NTL via cup without any overt s/s of aspiration. Vital stim/NMES completed today, placement 3a at 7.5mA for 22 mins, tolerating without any adverse reactions or side effects. Pt completed pharyngeal/laryngeal strengthening exercises x20 with vital stim in place. Pt's overall speech judged to be ~50% intelligible without use of strategies again today, but significantly improved with use of strategies. Pt again, more aware today for use of speech strategies.  Pt remains strongly motivated to improve. Continue goals above. Plan: Continue as per plan of care. Additional Information:     Barriers toward progress: None  Discharge recommendations:  [] Home independently  [x] Home with assistance []  24 hour supervision  [] ECF [] Other:  Continued Tx Upon Discharge: ? [] Yes [] No [x] TBD based on progress while on ARU [] Vital Stim indicated [] Other:   Estimated discharge date: 07/22/22    Interventions used this date:  [x] Speech/Language Treatment  [] Instruction in HEP [] Group [x] Dysphagia Treatment [] Cognitive Treatment   [] Other: Total Time Breakdown / Charges    Time in Time out Total Time / units   Cognitive Tx -- -- --   Speech Tx 0950  1420 1000  1430 10 min / 1 unit  10 min/ 1 unit    Dysphagia Tx 0930  1400 0950  1420 20 min / 1 unit  20 min/ 1 unit        Electronically Signed by     Tx session 1:  Arlene Hampton MA CCC-SLP #[id number]  Speech Language Pathologist    Tx session 2:   Hipolito Knox. KATIE CCC-SLP  Speech-Language Pathologist  [id number]

## 2022-07-06 NOTE — PROGRESS NOTES
Comprehensive Nutrition Assessment    Type and Reason for Visit:  Reassess    Nutrition Recommendations/Plan:   1. Continue soft and bite sized diet, mildly thick liquids  2. Diet texture/liquid consistency per SLP  3. Continue Magic cups BID, add Chocolate Ensure compact BID   4. Please obtain updated weight  5. Monitor nutrition adequacy, pertinent labs, bowel habits, wt changes, and clinical progress     Malnutrition Assessment:  Malnutrition Status: Moderate malnutrition (06/30/22 1649)    Context:  Acute Illness     Findings of the 6 clinical characteristics of malnutrition:    Body Fat Loss:  Mild body fat loss Orbital   Muscle Mass Loss:  Mild muscle mass loss Temples (temporalis),Clavicles (pectoralis & deltoids)    Nutrition Assessment:    Follow up: Pt nutritionally stable AEB PO intakes % of meals and ONS. Pt reports good appetite, eating lunch during visit with >75% consumed. Pt favorable to adding magic cups, requested Ensure. Discussed option of chocolate ensure compact (mildly thick ONS), pt favorable. Most recent weight from 6/29, will order updated weight. Will continue to monitor. Nutrition Related Findings:    Labs reviewed.  7/3. Wound Type: None       Current Nutrition Intake & Therapies:    Average Meal Intake: %  Average Supplements Intake: %  ADULT DIET; Dysphagia - Soft and Bite Sized; Mildly Thick ([place])  ADULT ORAL NUTRITION SUPPLEMENT; Lunch, Dinner; Frozen Oral Supplement    Anthropometric Measures:  Height: 6' 2\" (188 cm)  Ideal Body Weight (IBW): 190 lbs (86 kg)       Current Body Weight: 145 lb (65.8 kg), 76.3 % IBW. Weight Source: Bed Scale  Current BMI (kg/m2): 18.6  Usual Body Weight: 165 lb (74.8 kg) (per pt)  % Weight Change (Calculated): -12.1                    BMI Categories: Normal Weight (BMI 18.5-24. 9)    Estimated Daily Nutrient Needs:  Energy Requirements Based On: Kcal/kg (25-30)  Weight Used for Energy Requirements: Current  Energy (kcal/day): 5917-3707  Weight Used for Protein Requirements: Current (1.2-1.4)  Protein (g/day): 79-92  Method Used for Fluid Requirements: 1 ml/kcal  Fluid (ml/day): 1469-6943    Nutrition Diagnosis:   · Inadequate oral intake related to inadequate protein-energy intake as evidenced by poor intake prior to [place] Controls loss,Criteria as identified in malnutrition assessment    Nutrition Interventions:   Food and/or Nutrient Delivery: Continue Current Diet,Modify Oral Nutrition Supplement  Nutrition Education/Counseling: No recommendation at this time  Coordination of Nutrition Care: Continue to monitor while inpatient  Plan of Care discussed with: Patient    Goals:  Previous Goal Met: Progressing toward Goal(s)  Goals: PO intake 50% or greater,prior to discharge       Nutrition Monitoring and Evaluation:   Behavioral-Environmental Outcomes: None Identified  Food/Nutrient Intake Outcomes: Food and Nutrient Intake,Supplement Intake  Physical Signs/Symptoms Outcomes: Biochemical Data,Nutrition Focused Physical Findings,Weight    Discharge Planning:    Continue current diet,Continue Oral Nutrition Supplement     [address] [facility], [address]: [id number]

## 2022-07-06 NOTE — PROGRESS NOTES
Karo Henderson  7/6/2022  [id number]    Chief Complaint: Acute CVA (cerebrovascular accident) [facility])    Subjective:   No acute events overnight. Today Elliott Martino is seen in his room. He reports that he is feeling well and denies acute complaints at this time. ROS: denies f/c, n/v, cp, sob    Objective:  Patient Vitals for the past 24 hrs:   BP Temp Temp src Pulse Resp SpO2   07/06/22 1000 105/78 97.8 °F (36.6 °C) Oral 92 16 97 %   07/05/22 2030 112/73 97.9 °F (36.6 °C) Oral 88 16 98 %     Gen: No distress, pleasant. Asleep in chair  HEENT: Normocephalic, atraumatic. CV: extremities well perfused  Resp: No respiratory distress. Abd: Soft, nontender   Ext: No edema. Neuro: Alert, oriented, appropriately interactive. Wt Readings from Last 3 Encounters:   06/29/22 145 lb 15.1 oz (66.2 kg)   02/14/22 145 lb (65.8 kg)       Laboratory data:   Lab Results   Component Value Date    WBC 5.5 07/04/2022    HGB 15.5 07/04/2022    HCT 44.6 07/04/2022    .3 (H) 07/04/2022     07/04/2022       Lab Results   Component Value Date/Time     07/04/2022 08:34 AM    K 4.3 07/04/2022 08:34 AM    CL 99 07/04/2022 08:34 AM    CO2 25 07/04/2022 08:34 AM    BUN 14 07/04/2022 08:34 AM    CREATININE 0.7 07/04/2022 08:34 AM    GLUCOSE 173 07/04/2022 08:34 AM    CALCIUM 10.1 07/04/2022 08:34 AM        Therapy progress:  PT  Position Activity Restriction  Other position/activity restrictions: R sided weakness; up with assist; Nectar thick liquids  Objective     Sit to Stand:  Moderate Assistance  Stand to sit: Moderate Assistance  Bed to Chair: Moderate assistance (to L with cues for safety)  Device: Parallel Bars  Assistance: Minimal assistance  Distance: 8' and 20'  OT  PT Equipment Recommendations  Equipment Needed: No  Toilet - Technique: Ambulating  Equipment Used: Standard toilet (with L grab bar)  Toilet Transfers Comments: cues for safe hand placement; decreased proprioception when going to initate sit down to toilet  Assessment        SLP  Current Diet : Soft and Bite-Sized             Body mass index is 18.74 kg/m². Assessment and Plan:  Yo Correa is a 60year old male with a past medical history significant for left sided stroke due to ICA dissection (2018) without residual deficits, HLD, and nicotine dependence who presented to  on 6/26/22 with acute onset of right face, arm, and leg weakness, found to have acute CVA. He was admitted to [facility] on 6/29/22 due to functional deficits below his baseline.      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 Follow bowel movements. Enema or suppository if needed.      Bladder: Check PVR x 3. [facility] if PVR > 200ml or if any volume is > 500 ml.      Sleep: Trazodone provided prn.      Follow up appointments: PCP, Stroke clinic    Services: home health versus outpatient [address]  EDOD: 7/22/22    Interdisciplinary team conference was held today with entire rehab treatment team including PT, OT, SLP, Dietician, RN, and SW. Discussion focused on progress toward rehab goals and discharge planning. Barriers: endurance, dysphagia, balance. Total treatment time >35 min with greater than 50% spent in care coordination. [facility] MD 7/6/2022, 3:01 PM Name band;

## 2022-07-06 NOTE — PROGRESS NOTES
Occupational Therapy  Facility/Department: [facility]  Rehabilitation Occupational Therapy Daily Treatment Note    Date: 22  Patient Name: Shahzad Ball       Room: [facility]4/0451-  MRN: [id number]  Account: [de-identified]   : 1960  (63 y.o.) Gender: male                    Past Medical History:  has a past medical history of CVA (cerebral vascular accident) ([address] Past Surgical History:   has no past surgical history on file. Restrictions  Restrictions/Precautions: Fall Risk  Other position/activity restrictions: R sided weakness; up with assist; Nectar thick liquids    Subjective  Subjective: Pt up in chair, agreeable and motivated for OT session. RN cleared therapy; pt reporting mild pain but did not ellaborate  Restrictions/Precautions: Fall Risk             Objective     Cognition  Overall Cognitive Status: Exceptions  Arousal/Alertness: Appropriate responses to stimuli  Following Commands: Follows one step commands with repetition; Follows one step commands consistently  Attention Span: Appears intact  Memory: Appears intact  Safety Judgement: Decreased awareness of need for assistance;Decreased awareness of need for safety  Problem Solving: Assistance required to generate solutions;Assistance required to identify errors made  Insights: Decreased awareness of deficits  Initiation: Requires cues for some  Sequencing: Requires cues for some  Cognition Comment: impulsive tendencies; decreased awareness of RUE during mobility  Orientation  Overall Orientation Status: Within Functional Limits  Orientation Level: Oriented to time;Oriented to situation;Oriented to person;Oriented to place   Perception  Overall Perceptual Status: Impaired  Unilateral Attention: Cues to attend to right side of body;Cues to maintain midline in sitting;Cues to maintain midline in standing  Initiation: Cues to initiate tasks  Motor Planning: Cues to use objects appropriately     ADL  Grooming/Oral Hygiene  Assistance Level: Minimal assistance  Skilled Clinical Factors: Min A for balance in stance, VC for use of R hand as stabilizer, d/t toothpaste drying out required set up to place on toothbrsuh  Lower Extremity Dressing  Assistance Level: Increased time to complete;Verbal cues; Set-up; Moderate assistance  Skilled Clinical Factors: Mod A for threading R foot into personal PJ pants, and Min A for L leg d/t narrow cuff of pants and non skid socks; pt pulled up with L hand with Min A for balance in stance (pt's pants fell down d/t large size upon standing to doff); dep for tieing pant drawstring          Functional Mobility  Device: Rolling walker (with R clam shell)  Activity: To/From therapy gym  Supine to Sit  Assistance Level: Minimal assistance  Skilled Clinical Factors: cues for safety awareness  Scooting  Assistance Level: Stand by assist  Skilled Clinical Factors: backward into recliner    Transfers  Surface: To chair with arms;From chair with arms  Additional Factors: Set-up; Verbal cues; Increased time to complete;Hand placement cues  Device: Walker (with R clam shell)  Sit to Stand  Assistance Level: Moderate assistance; Requires x 2 assistance  Skilled Clinical Factors: progressing to Min- CGA x2 with education to push up with R hand and L hand from chair with arms  Stand to Sit  Assistance Level: Minimal assistance  Skilled Clinical Factors: cues for hand placement and R side attention, cues for safety during transfers including making sure that both legs touch chair and to walk forward to chair vs walking backward >10 feet     Neuromuscular Education  Neuromuscular education: Yes  NDT Treatment: Gait ;Upper extremity;Standing;Sitting  Facilitation techniques: OT facilitated flexion/extension of R hand with Bioness on Program G, use of grasp program to reach for cones and put into pattern, use of open hand for WB while in stance with arms extended  Weight Bearing  Weight Bearing Technique: Yes  RUE Weight Bearing: Extended arm standing  LUE Weight Bearing: Extended arm standing  Response To Weight Bearing Technique: good response to WB with clam shell and extended arm during mobility; fair response to open hand WB on high/low table while in stance, up to Mod A for balance with L foot on 2\" step to encourage R side WB and increasing self correction, pt reported that he felt \"off balance\" and accepted education as to purpose of activity    Perception: Impaired  Unilateral Attention: Cues to attend to right side of body; Cues to maintain midline in sitting; Cues to maintain midline in standing  Initiation: Cues to initiate tasks   Motor Planning: Cues to use object appropriately     Coordination   Gross Motor: functional mobility with Min-CGA with Max cueing for safety and sequencing with R clam shell on RW  Fine Motor: grasp/release with BioNess and cones, fair response, down grade task next trial to bean bags or sponge. Pt with AROM digit 2-5 extension and flexion though weak and decreased motor speed, minimal thumb abduction AROM this date    OT Exercises  A/AROM Exercises: x15 AAROM with fingers interlaced for elbow flexion and chest press while seated in chair  Dynamic Standing Balance Exercises: up and down 4 steps with Min A, OT facilitating R hand WB and grasp on railing; ambulation with RW and R clamshell in hallway ARU gym around nursing station to [facility] community room  Postural Correction Exercises: VC to correct in stance     Assessment  Assessment  Assessment: Madison Rojo is progressing in therapy well, currently limited by safety awareness and R hemiparesis. Demo'd poor safety awareness, requiring consistent cues for attention to R side during transfers and ambulation.    Functional Mobility: Mod Ax2 progressing up to CGA x2 with cues to push up with B hands from chair   ADL: Mod A for donning pants, Min A for oral care   Neuromuscular re education: BioNess and WB in stance with extended ar, MD perfect serve'ed and verbally campbell'ed e stim. Order placed by OT to acknowledge such. Activity was tolerated well, pt required no undue rest breaks. Continue OT POC to address performance deficits in ADLs and functional mobility. Recommend Home with 24/7 Assist/Supervision , HHOT and Outpatient OT upon d/c. Depending on availability of transport. Activity Tolerance: Patient tolerated treatment well  Discharge Recommendations: Home with Home health OT;24 hour supervision or assist; OP OT  OT Equipment Recommendations  Equipment Needed: Yes  Mobility Devices: ADL Assistive Devices  Walker: Rolling (with R clam shell)  Wheelchair: Standard; Wheelchair Cushion Standard  ADL Assistive Devices: Toileting - 3-in-1 Commode;Grab Bars - shower; Shower Chair with back; Reacher;Sock-Aid Hard;Elastic Shoe Laces  Other: Pt will benefit from a BSC to be used in the close confinements of the bedroom overnight to increase independence and safety during toileting considering pt's R sided hemiparesis and decreased dynamic balance. Safety Devices  Safety Devices in place: Yes  Type of devices: Call light within reach; Chair alarm in place; Left in chair; All fall risk precautions in place; Patient at risk for falls;Gait belt  Restraints  Initially in place: No    Patient Education  Education  Education Given To: Patient  Education Provided: Role of Therapy; ADL Function;Equipment; Mobility Training;Transfer Training;Energy Conservation; Safety;Precautions;Plan of Care;DME/Home Modifications; Home Exercise Program  Education Provided Comments: disease specific: safety with R arm during transfers and ambulation; AAROM with fingers interlaced and use of thera sponge to encourage grasp/release as HEP  Education Method: Verbal;Demonstration  Barriers to Learning: Cognition  Education Outcome: Verbalized understanding;Continued education needed    Plan  Plan  Times per Week: 5 of 7 days  Times per Day: Daily  Specific Instructions for Next Treatment: Box and Blocks assessment and trial of dressing stick and/or reacher for LB dressing  Current Treatment Recommendations: Strengthening;Balance training;Functional mobility training; Endurance training;Neuromuscular re-education;Positioning;Equipment evaluation, education, & procurement; Modalities; Patient/Caregiver education & training; Safety education & training;Self-Care / ADL; Home management training;Cognitive/Perceptual training;Coordination training;Sensory integraion    Goals  Patient Goals   Patient goals : \"to get back to normal\"  Short Term Goals  Time Frame for Short term goals: within 7 days by 7/6/22  Short Term Goal 1: Pt will perform toilet transfer with Mod A and LRAD  Short Term Goal 2: Pt will perform LB dressing with Min A and AE PRN- goal progressing 7/6  Short Term Goal 3: Pt will perform UB dressing with Min A and appropriate katlyn techniques  Short Term Goal 4: Pt will perform functional dynamic balance activity in stance for >5 minutes with Min A and LRAD- GOAL MET 7/6  Short Term Goal 5: Pt will perform 2-3 grooming tasks with Min A for during tasks standing vs sitting with LRAD and Min A for balance  Long Term Goals  Time Frame for Long term goals : within 21 days by  Long Term Goal 1: Pt will perform TB dressing Mod I  Long Term Goal 2: Pt will perfrom TB bathing Mod I and AE PRN  Long Term Goal 3: Pt will perform simple IADL task in stance with LRAD for >7 minutes with supervision  Long Term Goal 4: Pt will self feed with R hand with Min A  Long Term Goal 5: Add goal after objective R vs L hand assessment completed with  strength, box and blocks, etc.            Therapy Time   Individual Concurrent Group Co-treatment   Time In 0830 0900   Time Out 0900 0930   Minutes 30     30   Timed Code Treatment Minutes: 60 Minutes     Co-tx collaboration this date to safely meet goals and will have better occupational performance outcomes with in a co-treatment than 1:1 session.      Kiara Diaz OT

## 2022-07-06 NOTE — PROGRESS NOTES
Physical Therapy  Facility/Department: [facility]  Rehabilitation Physical Therapy Treatment Note    NAME: Teresa Green  : 1960 (63 y.o.)  MRN: [id number]  CODE STATUS: Full Code    Date of Service: 22     Restrictions:  Restrictions/Precautions: Fall Risk  Position Activity Restriction  Other position/activity restrictions: R sided weakness; up with assist     SUBJECTIVE  Subjective  Subjective: Pt agreeable to therapy. Up in gym with OT present. Pain: No c/o pain. OBJECTIVE     Functional Mobility  Balance  Sitting Balance: Supervision  Standing Balance: Minimal assistance  Sit to Stand  Assistance Level: Minimal assistance  Skilled Clinical Factors: cues for hand placement and safety  Stand to Sit  Assistance Level: Minimal assistance  Skilled Clinical Factors: cues for hand placement and sequencing    Environmental Mobility  Ambulation  Surface: Level surface  Device: Rolling walker (with R clamshell)  Distance: 210' + short distances in gym  Assistance Level: Minimal assistance  Gait Deviations: Decreased step length right;Decreased heel strike right; Unsteady gait; Path deviations; Slow hortencia;Decreased weight shift bilateral  Skilled Clinical Factors: tactile cues to faciliate increased L weight shift to improve R foot clearance; verbal cues for heel strike on R  Stairs  Stair Height: 6''  Device: Bilateral handrails  Number of Stairs: 4  Additional Factors: Verbal cues; Hand placement cues; Non-reciprocal going up;Non-reciprocal going down; Increased time to complete  Assistance Level: Minimal assistance  Skilled Clinical Factors: cues for safety and sequencing stairs  Skilled Clinical Factors - Comments: increased time and effort to bring RLE onto step    PT Exercises  Dynamic Standing Balance Exercises: Dynamic reaching activity in standing with min A and L foot on 2\" step to increase strength in RLE - verbal cues to prevent R knee from buckling    2nd session:  Pt up in chair upon arrival. Agreeable to therapy. Denies pain. Sit-stand with min A from a variety of surface heights    Ambulation: 100' and 160' with RW with R clamshell and min A, step through gait pattern with decreased foot clearance on R and decreased heel strike on R. Unsteady at times with cues for keeping SERENE within RW especially with turns    Dynamic standing balance: Sidestepping R and L in parallel bars stepping over bean bags x5 reps and stepping over cones x5 reps with improved foot clearance on R with cues. Step ups on 2\" step in parallel bars with RLE x5 reps and min A    Pt left sitting up in chair with all needs in reach and chair alarm on. ASSESSMENT/PROGRESS TOWARDS GOALS     Assessment  Assessment: Pt demonstrating improvement with sit-stand transfers this date requiring min A from a variety of surfaces. Pt able to tolerate increased gait distance however requires mod verbal cues for safety and min A. Will continue to progress mobility as pt tolerates.   Activity Tolerance: Patient tolerated treatment well  Discharge Recommendations: 24 hour supervision or assist;Patient would benefit from continued therapy after discharge  PT Equipment Recommendations  Equipment Needed: No    Goals  Patient Goals   Patient goals : \"to be able to walk again and use my R arm\"  Short Term Goals  Time Frame for Short term goals: 10 days (7/9/22)  Short term goal 1: Pt will perform bed mobility with min A  -7/05 NT  Short term goal 2: Pt will perform transfers with min A  -7/05: not met; fluctuates b/w Min and mod A  Short term goal 3: Pt will ambulate 22' with quad cane and mod A  -7/05 Goal met  Short term goal 4: Pt will negotiate 4 steps with HR and mod A  -7/05 Goal met  Long Term Goals  Time Frame for Long term goals : 21 days (7/20/22)  Long term goal 1: Pt will perform bed mobility mod I  Long term goal 2: Pt will perform transfers with supervision  Long term goal 3: Pt will ambulate 48' with quad cane and CGA 7/05 update goal: Pt will ambulate 150' with RW and clamshell with supervision  Long term goal 5: Pt will negotiate 4 steps with HR and min A    PLAN OF CARE/SAFETY  Plan  Plan:  minutes of therapy at least 5 out of 7 days a week  Plan weeks: 21 days  Specific Instructions for Next Treatment: R katlyn strength training and continue mobility progression  Current Treatment Recommendations: Strengthening;ROM;Balance training;Functional mobility training;Transfer training; Endurance training; Wheelchair mobility training;Gait training;Stair training;Neuromuscular re-education;Manual Therapy - Soft Tissue Mobilization;Patient/Caregiver education & training; Safety education & training;Home exercise program;Equipment evaluation, education, & procurement; Modalities; Therapeutic activities;ADL/Self-care training;IADL training  Safety Devices  Type of Devices: All fall risk precautions in place;Call light within reach; Chair alarm in place;Gait belt;Nurse notified; Left in chair;Patient at risk for falls    EDUCATION  Education  Education Given To: Patient  Education Provided: Role of Therapy; Safety;Equipment; Mobility Training; Fall Prevention Strategies;Transfer Training;Plan of Care;Precautions; Home Exercise Program  Education Provided Comments: Safety with gait and transfers  Education Method: Demonstration;Verbal  Barriers to Learning: Cognition  Education Outcome: Verbalized understanding;Continued education needed    Therapy Time   Individual Concurrent Group Co-treatment   Time In       0900   Time Out       0930   Minutes       30     Timed Code Treatment Minutes: 30 Minutes      Second Session Therapy Time:   Individual Concurrent Group Co-treatment   Time In  1330         Time Out  1400         Minutes  30         Timed Code Treatment Minutes:  30    Total Treatment Minutes:   PETE Mcallister, 07/06/22 at 9:57 AM

## 2022-07-06 NOTE — PLAN OF CARE
Problem: Safety - Adult  Goal: Free from fall injury  7/6/2022 1351 by FANNY AVILA  Outcome: Progressing  7/6/2022 0035 by Erendira Lund RN  Outcome: Progressing  Note: Pt. Free from falls or self injury at this time. Falls risk protocol maintained. Call light within reach.

## 2022-07-07 LAB
ANION GAP SERPL CALCULATED.3IONS-SCNC: 11 MMOL/L (ref 3–16)
BASOPHILS ABSOLUTE: 0.1 K/UL (ref 0–0.2)
BASOPHILS RELATIVE PERCENT: 1.1 %
BUN BLDV-MCNC: 13 MG/DL (ref 7–20)
CALCIUM SERPL-MCNC: 10.3 MG/DL (ref 8.3–10.6)
CHLORIDE BLD-SCNC: 101 MMOL/L (ref 99–110)
CO2: 26 MMOL/L (ref 21–32)
CREAT SERPL-MCNC: 0.7 MG/DL (ref 0.8–1.3)
EOSINOPHILS ABSOLUTE: 0.1 K/UL (ref 0–0.6)
EOSINOPHILS RELATIVE PERCENT: 1.7 %
GFR AFRICAN AMERICAN: >60
GFR NON-AFRICAN AMERICAN: >60
GLUCOSE BLD-MCNC: 120 MG/DL (ref 70–99)
HCT VFR BLD CALC: 44.2 % (ref 40.5–52.5)
HEMOGLOBIN: 15.5 G/DL (ref 13.5–17.5)
LYMPHOCYTES ABSOLUTE: 1.1 K/UL (ref 1–5.1)
LYMPHOCYTES RELATIVE PERCENT: 22.9 %
MCH RBC QN AUTO: 37.5 PG (ref 26–34)
MCHC RBC AUTO-ENTMCNC: 35.1 G/DL (ref 31–36)
MCV RBC AUTO: 106.9 FL (ref 80–100)
MONOCYTES ABSOLUTE: 0.8 K/UL (ref 0–1.3)
MONOCYTES RELATIVE PERCENT: 16.1 %
NEUTROPHILS ABSOLUTE: 2.9 K/UL (ref 1.7–7.7)
NEUTROPHILS RELATIVE PERCENT: 58.2 %
PDW BLD-RTO: 13.9 % (ref 12.4–15.4)
PLATELET # BLD: 196 K/UL (ref 135–450)
PMV BLD AUTO: 8.2 FL (ref 5–10.5)
POTASSIUM REFLEX MAGNESIUM: 4.1 MMOL/L (ref 3.5–5.1)
RBC # BLD: 4.13 M/UL (ref 4.2–5.9)
SODIUM BLD-SCNC: 138 MMOL/L (ref 136–145)
WBC # BLD: 5 K/UL (ref 4–11)

## 2022-07-07 PROCEDURE — 97112 NEUROMUSCULAR REEDUCATION: CPT

## 2022-07-07 PROCEDURE — 6370000000 HC RX 637 (ALT 250 FOR IP): Performed by: STUDENT IN AN ORGANIZED HEALTH CARE EDUCATION/TRAINING PROGRAM

## 2022-07-07 PROCEDURE — 92507 TX SP LANG VOICE COMM INDIV: CPT

## 2022-07-07 PROCEDURE — 6360000002 HC RX W HCPCS: Performed by: STUDENT IN AN ORGANIZED HEALTH CARE EDUCATION/TRAINING PROGRAM

## 2022-07-07 PROCEDURE — 97530 THERAPEUTIC ACTIVITIES: CPT

## 2022-07-07 PROCEDURE — 97535 SELF CARE MNGMENT TRAINING: CPT

## 2022-07-07 PROCEDURE — 80048 BASIC METABOLIC PNL TOTAL CA: CPT

## 2022-07-07 PROCEDURE — 36415 COLL VENOUS BLD VENIPUNCTURE: CPT

## 2022-07-07 PROCEDURE — 97129 THER IVNTJ 1ST 15 MIN: CPT

## 2022-07-07 PROCEDURE — 97116 GAIT TRAINING THERAPY: CPT

## 2022-07-07 PROCEDURE — 92526 ORAL FUNCTION THERAPY: CPT

## 2022-07-07 PROCEDURE — 85025 COMPLETE CBC W/AUTO DIFF WBC: CPT

## 2022-07-07 PROCEDURE — 1280000000 HC REHAB R&B

## 2022-07-07 RX ORDER — POLYVINYL ALCOHOL 14 MG/ML
2 SOLUTION/ DROPS OPHTHALMIC 2 TIMES DAILY
Status: DISCONTINUED | OUTPATIENT
Start: 2022-07-07 | End: 2022-07-22 | Stop reason: HOSPADM

## 2022-07-07 RX ADMIN — POLYVINYL ALCOHOL 2 DROP: 14 SOLUTION/ DROPS OPHTHALMIC at 12:39

## 2022-07-07 RX ADMIN — TRAZODONE HYDROCHLORIDE 50 MG: 50 TABLET ORAL at 19:39

## 2022-07-07 RX ADMIN — ASPIRIN 81 MG: 81 TABLET, CHEWABLE ORAL at 08:41

## 2022-07-07 RX ADMIN — ENOXAPARIN SODIUM 40 MG: 100 INJECTION SUBCUTANEOUS at 08:41

## 2022-07-07 RX ADMIN — POLYVINYL ALCOHOL 2 DROP: 14 SOLUTION/ DROPS OPHTHALMIC at 19:39

## 2022-07-07 RX ADMIN — ATORVASTATIN CALCIUM 10 MG: 10 TABLET, FILM COATED ORAL at 19:39

## 2022-07-07 ASSESSMENT — PAIN SCALES - GENERAL
PAINLEVEL_OUTOF10: 0

## 2022-07-07 NOTE — PROGRESS NOTES
Occupational Therapy  Facility/Department: [facility]  Rehabilitation Occupational Therapy Daily Treatment Note    Date: 22  Patient Name: Jass Huston       Room: 9268/2795-09  MRN: [id number]  Account: [de-identified]   : 1960  (63 y.o.) Gender: male                    Past Medical History:  has a past medical history of CVA (cerebral vascular accident) ([address] Past Surgical History:   has no past surgical history on file. Restrictions  Restrictions/Precautions: Fall Risk  Other position/activity restrictions: R sided weakness; up with assist; Nectar thick liquids    Subjective  Subjective: Pt up in chair at start of session, reporting R sided pain and cramping during quadruped but did not elaborate; able to continue treatment  Restrictions/Precautions: Fall Risk             Objective     Cognition  Overall Cognitive Status: Exceptions  Arousal/Alertness: Appropriate responses to stimuli  Following Commands: Follows one step commands with repetition; Follows one step commands consistently  Attention Span: Appears intact  Memory: Appears intact  Safety Judgement: Decreased awareness of need for assistance;Decreased awareness of need for safety  Problem Solving: Assistance required to generate solutions;Assistance required to identify errors made  Insights: Decreased awareness of deficits  Initiation: Requires cues for some  Sequencing: Requires cues for some  Orientation  Overall Orientation Status: Within Functional Limits  Orientation Level: Oriented to time;Oriented to situation;Oriented to person;Oriented to place   Perception  Overall Perceptual Status: Impaired  Unilateral Attention: Cues to attend to right side of body;Cues to maintain midline in sitting;Cues to maintain midline in standing  Initiation: Cues to initiate tasks  Motor Planning: Cues to use objects appropriately     ADL  Grooming/Oral Hygiene  Assistance Level: Stand by assist  Skilled Clinical Factors: use of L hand to comb hair and apply deodorant  Upper Extremity Bathing  Assistance Level: Contact guard assist  Skilled Clinical Factors: increased time to apply soap onto washcloth, pt self initiating use of R hand during tasks  Lower Extremity Bathing  Assistance Level: Minimal assistance  Skilled Clinical Factors: Min A for balance while in stance to wash posterior buttocks  Upper Extremity Dressing  Assistance Level: Contact guard assist  Skilled Clinical Factors: pt req cues for katlyn technique with good carry over for t shirt, increased time required to pull down over torso  Lower Extremity Dressing  Assistance Level: Moderate assistance  Skilled Clinical Factors: cues for katlyn technique for brief with assistance to manage RLE into leg holes, better donning with pants  Putting On/Taking Off Footwear  Equipment Provided: Sock aid  Assistance Level: Moderate assistance  Skilled Clinical Factors: education for use of sock aide, assistance to manage onto foot with verbal cues to pull          Functional Mobility  Device: Rolling walker (R clam shell)  Activity: To/From therapy gym; To/From bathroom  Assistance Level: Minimal assistance  Skilled Clinical Factors: cues for hand placement and R side attention, cues for safety during transfers  Transfers  Surface: To chair with arms;From chair with arms (TTB)  Additional Factors: Set-up; Verbal cues; Increased time to complete;Hand placement cues  Device: Walker (R clam shell)  Sit to Stand  Assistance Level: Minimal assistance  Skilled Clinical Factors: Min A x2 progressing to CGA x1, tactile faciliation of R hand to push up from surface  Stand to Sit  Assistance Level: Contact guard assist  Skilled Clinical Factors: cues for safety   Neuromuscular Education  Neuromuscular education: Yes  NDT Treatment: Gait ;Upper extremity;Standing;Sitting;Quadruped  Facilitation techniques: OT faciliated WB onto fists with tactile cueing on R tricep and support of shoulder girdle/elbow to prevent buckling, assisted pt into supported postion with forearms resting on blue yoga ball from quadruped. Mod A x2 to acheive quadruped on mat from standing, maintained position for ~7 minutes. Rolled on torso in front and to R side diagonally, increasing hip extension and weight through B arms. Pt reporting fatigue and R leg cramping and required Mod-Max A to transition to R side lying and eventually supine on mat  Weight Bearing  Weight Bearing Technique: Yes  RUE Weight Bearing: Extended arm standing;Quadriped; High kneeling  LUE Weight Bearing: Quadriped; High kneeling;Extended arm standing  Response To Weight Bearing Technique: improved balance with clam shell and RW this date, 7 minutes spent on mat with yoga ball  OT Exercises  A/AROM Exercises: AAROM for shoulder flexion and abduction/adduction, elbow flexion, supination/pronation, and wrist flexion/extension while supine on mat  Dynamic Standing Balance Exercises: forward and lateral stepping over 5 cones while in // bars with faciliation of grasp onto bar with R hand  Postural Correction Exercises: VC to correct in stance     Assessment  Assessment  Assessment: Marbin Kurtz is progressing in therapy steadily, currently limited by safety awareness and dynamic balance deficits. Demo'd fair safety awareness, requiring consistent cueing for safety during transfers. Functional Mobility: Min A - CGA x1-2 for sit <> stand with R clam shell walker  ADL: Min A for LB bathing while in stance, CGA for UB bathing, Mod A for LB dressing  Neuromuscular edu:  Bioness on RUE  Activity was tolerated well, pt required one prolonged rest break after quadriped . Continue OT POC to address performance deficits in ADLs and functional mobility. Co-tx collaboration this date to safely meet goals and will have better occupational performance outcomes with in a co-treatment than 1:1 session.      Activity Tolerance: Patient tolerated treatment well  Discharge Recommendations: Home with Home health OT;24 hour supervision or assist;Outpatient OT  OT Equipment Recommendations  Equipment Needed: Yes  Walker: Rolling (with R clam shell)  Wheelchair: Standard; Wheelchair Cushion Standard  ADL Assistive Devices: Toileting - 3-in-1 Commode;Grab Bars - shower; Shower Chair with back; Reacher;Sock-Aid Hard;Elastic Shoe Laces  Other: Pt will benefit from a BSC to be used in the close confinements of the bedroom overnight to increase independence and safety during toileting considering pt's R sided hemiparesis and decreased dynamic balance. Safety Devices  Safety Devices in place: Yes  Type of devices: Call light within reach; Chair alarm in place; Left in chair; All fall risk precautions in place; Patient at risk for falls;Gait belt  Restraints  Initially in place: No    Patient Education  Education  Education Given To: Patient  Education Provided: Role of Therapy; ADL Function;Equipment; Mobility Training;Transfer Training;Energy Conservation; Safety;Precautions;Plan of Care;DME/Home Modifications; Home Exercise Program  Education Provided Comments: disease specific: safety with R arm during transfers and ambulation; katlyn techniques  Education Method: Verbal;Demonstration  Barriers to Learning: Cognition  Education Outcome: Verbalized understanding;Continued education needed    Plan  Plan  Times per Week: 5 of 7 days  Times per Day: Daily  Specific Instructions for Next Treatment: trial of dressing stick  Current Treatment Recommendations: Strengthening;Balance training;Functional mobility training; Endurance training;Neuromuscular re-education;Positioning;Equipment evaluation, education, & procurement; Modalities; Patient/Caregiver education & training; Safety education & training;Self-Care / ADL; Home management training;Cognitive/Perceptual training;Coordination training;Sensory integraion    Goals  Patient Goals   Patient goals : \"to get back to normal\"  Short Term Goals  Time Frame for Short term goals: within 7 days by 7/6/22  Short Term Goal 1: Pt will perform toilet transfer with Mod A and LRAD  Short Term Goal 2: Pt will perform LB dressing with Min A and AE PRN- goal progressing 7/6  Short Term Goal 3: Pt will perform UB dressing with Min A and appropriate katlyn techniques- GOAL MET 7/7  Short Term Goal 4: Pt will perform functional dynamic balance activity in stance for >5 minutes with Min A and LRAD- GOAL MET 7/6  Short Term Goal 5: Pt will perform 2-3 grooming tasks with Min A for during tasks standing vs sitting with LRAD and Min A for balance  Long Term Goals  Time Frame for Long term goals : within 21 days by 7/20/22  Long Term Goal 1: Pt will perform TB dressing Mod I  Long Term Goal 2: Pt will perfrom TB bathing Mod I and AE PRN  Long Term Goal 3: Pt will perform simple IADL task in stance with LRAD for >7 minutes with supervision  Long Term Goal 4: Pt will self feed with R hand with Min A  Long Term Goal 5: Pt will increase Block and Box score on R hand by >4 blocks- goal progressing as of assessment 7/7 R hand = 1 block in 60 seconds.       Therapy Time   Individual Concurrent Group Co-treatment   Time In 1030     0900   Time Out 1130     0930   Minutes 60     30   Timed Code Treatment Minutes: 1225 [facility]

## 2022-07-07 NOTE — PROGRESS NOTES
Physical Therapy  Facility/Department: [facility]  Rehabilitation Physical Therapy Treatment Note    NAME: Madison Rojo  : 1960 (63 y.o.)  MRN: [id number]  CODE STATUS: Full Code    Date of Service: 22       Restrictions:  Restrictions/Precautions: Fall Risk  Position Activity Restriction  Other position/activity restrictions: R sided weakness; up with assist; Nectar thick liquids     SUBJECTIVE  Subjective  Subjective: Pt sitting up in chair upon approach. Agreeable to PT tx  Pain: 0/10 pain at rest; 5/10 pain with activity (cramping in R hip) improves with stretching    OBJECTIVE  Cognition  Overall Cognitive Status: Exceptions  Arousal/Alertness: Appropriate responses to stimuli  Following Commands: Follows one step commands with repetition; Follows one step commands consistently  Attention Span: Appears intact  Memory: Appears intact  Safety Judgement: Decreased awareness of need for assistance;Decreased awareness of need for safety  Problem Solving: Assistance required to generate solutions;Assistance required to identify errors made  Insights: Decreased awareness of deficits  Initiation: Requires cues for some  Sequencing: Requires cues for some  Orientation  Overall Orientation Status: Within Functional Limits  Orientation Level: Oriented to time;Oriented to situation;Oriented to person;Oriented to place    Functional Mobility  Supine to Sit  Assistance Level: Moderate assistance  Skilled Clinical Factors: Pt assisted from quadruped to R sidelying with mod(A)x2 then assisted from supine to sitting EOM with mod(A)x1 after rest break  Balance  Sitting Balance: Supervision  Standing Balance: Minimal assistance  Transfers  Surface: From chair with arms; Wheelchair;From mat  Additional Factors: Hand placement cues; Increased time to complete;Verbal cues  Device: Walker (with R clamshell)  Sit to Stand  Assistance Level: Minimal assistance  Skilled Clinical Factors: cues for hand placement and safety and Will await forms.    overall technique; CGA from higher surfaces  Stand to Sit  Assistance Level: Minimal assistance  Skilled Clinical Factors: cues for hand placement and sequencing; poor eccentric control      Environmental Mobility  Ambulation  Surface: Level surface  Device: Rolling walker  Distance: 110 ft x2  Activity: Within Unit      Weight Bearing  Weight Bearing Technique: Yes  RUE Weight Bearing: Quadriped  LUE Weight Bearing: Quadriped  Response To Weight Bearing Technique: Pt tolerates quadriped positiong x7 minutes while OT facilitates RUE WBing and PT facilitates hip positioning. Pt props up on blue exercises ball and performs forward reaches and lateral reaches in tall kneeling position to improve glute and hamstring activation and core control      ASSESSMENT/PROGRESS TOWARDS GOALS       Assessment  Assessment: Pt continues to progress well with therapy. Pt requires CGA for STS from elevated surfaces or min/mod(A) from lower surfaces. Pt requires frequent cues for hand positioning and attention to R hand. Pt tolerates quadriped position and tall kneeling positiong for ~7 minutes this date. Pt would benefit from continued skilled PT to address current deficits.   Activity Tolerance: Patient tolerated treatment well  Discharge Recommendations: 24 hour supervision or assist;Patient would benefit from continued therapy after discharge  PT Equipment Recommendations  Equipment Needed: No    Goals  Patient Goals   Patient goals : \"to be able to walk again and use my R arm\"  Short Term Goals  Time Frame for Short term goals: 10 days (7/9/22)  Short term goal 1: Pt will perform bed mobility with min A  -7/05 NT  Short term goal 2: Pt will perform transfers with min A  -7/05: not met; fluctuates b/w Min and mod A  Short term goal 3: Pt will ambulate 22' with quad cane and mod A  -7/05 Goal met  Short term goal 4: Pt will negotiate 4 steps with HR and mod A  -7/05 Goal met  Long Term Goals  Time Frame for Long term goals : 21 days (7/20/22)  Long term goal 1: Pt will perform bed mobility mod I  Long term goal 2: Pt will perform transfers with supervision  Long term goal 3: Pt will ambulate 48' with quad cane and CGA 7/05 update goal: Pt will ambulate 150' with RW and clamshell with supervision  Long term goal 4: Pt will propel wheelchair 150' mod I; 7/05 discontinue goal  Long term goal 5: Pt will negotiate 4 steps with HR and min A    PLAN OF CARE/SAFETY  Plan  Plan:  minutes of therapy at least 5 out of 7 days a week  Plan weeks: 21 days  Specific Instructions for Next Treatment: R katlyn strength training and continue mobility progression  Current Treatment Recommendations: Strengthening;ROM;Balance training;Functional mobility training;Transfer training; Endurance training; Wheelchair mobility training;Gait training;Stair training;Neuromuscular re-education;Manual Therapy - Soft Tissue Mobilization;Patient/Caregiver education & training; Safety education & training;Home exercise program;Equipment evaluation, education, & procurement; Modalities; Therapeutic activities;ADL/Self-care training;IADL training  Safety Devices  Type of Devices: All fall risk precautions in place;Call light within reach; Chair alarm in place;Gait belt;Nurse notified; Left in chair;Patient at risk for falls    EDUCATION  Education  Education Given To: Patient  Education Provided: Role of Therapy; Safety;Equipment; Mobility Training; Fall Prevention Strategies;Transfer Training;Plan of Care;Precautions; Home Exercise Program  Education Provided Comments: Safety with gait and transfers  Education Method: Demonstration;Verbal  Barriers to Learning: Cognition  Education Outcome: Verbalized understanding;Continued education needed     Second Session:  Subjective: Pt sitting in chair upon arrival, agreeable to PT tx  - Pain: Denies    Objective:   -Bed Mobility: Pt performs sitting EOB to supine with min(A) at BLE.  Pt attempts to use katlyn-technique, but still needs help to complete task    -Transfers: Pt performs transfers from recliner and low therapy mat with min(A). Pt performs blocked practice from therapy mat positioned so hips at 90 degress. 4\" blocked placed under LLE to encourage use of RLE force production, grossly min(A)    -Ambulation: Pt ambulates 110 ft x2 reps with RW with R clamshell and CGA. Pt demo varying step lengths on R with verbal cues to increased hip force production.     -Exercise: Pt performs seated bicep curls, chest press x10 reps with 1.5# rossi + pronation/supination with AAROM and PT assisting with  positioning on rossi. Assessment: Pt tolerates PM PT session well with focus on transfer training and ambulation. Pt functioning below baseline and would benefit from skilled therapy to address current deficits mentioned above.        Plan: Continue PT POC as indicated      Therapy Time   Individual Concurrent Group Co-treatment   Time In 1300     0900   Time Out 1330     0930   Minutes 30     30     Timed Code Treatment Minutes:  Jared [facility], [place], 07/07/22 at 1:41 PM

## 2022-07-07 NOTE — PROGRESS NOTES
Speech Language Pathology  [facility]: ACUTE REHAB UNIT  SPEECH-LANGUAGE PATHOLOGY      [x] Daily  [] Weekly Care Conference Note  [] Discharge    Praneeth Bolus      LWF:1/[id number]/9397  ACU:[id number]  Rehab Dx/Hx: Acute CVA (cerebrovascular accident) ([facility] [address] [I63.9]    Precautions: falls  Home situation: lives with brother, manages own finances, did not take any meds PTA, active   ST Dx: [] Aphasia  [x] Dysarthria  [] Apraxia   [] Oropharyngeal dysphagia [x] Cognitive Impairment  [] Other:   Date of Admit: 6/29/2022  Room #: 0166/0166-01    Current functional status (updated daily):         Pt being seen for : [x] Speech/Language Treatment  [] Dysphagia Treatment [x] Cognitive Treatment  [] Other:  Communication: []WFL  [] Aphasia  [x] Dysarthria  [] Apraxia  [] Pragmatic Impairment [] Non-verbal  [] Hearing Loss  [] Other:   Cognition: [] WFL  [x] Mild  [] Moderate  [] Severe [] Unable to Assess  [] Other:  Memory: [] WFL  [x] Mild  [] Moderate  [] Severe [] Unable to Assess  [] Other:  Behavior: [x] Alert  [x] Cooperative  [x]  Pleasant  [] Confused  [] Agitated  [] Uncooperative  [] Distractible [] Motivated  [] Self-Limiting [] Anxious  [] Other:  Endurance:  [x] Adequate for participation in SLP sessions  [] Reduced overall  [] Lethargic  [] Other:  Safety: [x] No concerns at this time  [] Reduced insight into deficits  []  Reduced safety awareness [] Not following call light procedures  [] Unable to Assess  [] Other:    Current Diet Order:ADULT DIET;  Dysphagia - Soft and Bite Sized; Mildly Thick ([place])  ADULT ORAL NUTRITION SUPPLEMENT; Lunch, Dinner; Frozen Oral Supplement  ADULT ORAL NUTRITION SUPPLEMENT; Breakfast, Dinner; Standard 4 oz Oral Supplement  Swallowing Precautions: upright for all intake, stay upright for at least 30 mins after intake, small bites/sips, set-up assistance / distant supervision with intake, alternate bites/sips, slow rate of intake, general aspiration precautions and hold PO and contact SLP if s/s of aspiration or worsening respiratory status develop.           Date: 7/7/2022      Tx session 1  [id number] Tx session 2  All tx needs met in session 1    Total Timed Code Min 20    Total Treatment Minutes 60    Individual Treatment Minutes 60    Group Treatment Minutes 0    Co-Treat Minutes 0    Variance/Reason:  0    Pain Denies    Pain Intervention [] RN notified  [] Repositioned  [] Intervention offered and patient declined  [x] N/A  [] Other: [] RN notified  [] Repositioned  [] Intervention offered and patient declined  [] N/A  [] Other:   Subjective     Pt alert and cooperative, agreeable to tx. Pt upright in bedside chair for session. Objective:  Goals     Dysphagia Goals:  Short-term Goals  Timeframe for Short-term Goals: 14 days (7/13/22)    Goal 1. The patient will tolerate recommended diet without observed clinical signs of aspiration. Oral care completed with pt; swish and spit concept with thin liquids, no overt s/s of aspiration. FFWP:  -TL via cup  -x15 cup sips  -pt presented with wet vocal quality and delayed throat clearing on 3/15 trials       Pt agreeable to trials of regular solids tomorrow. Goal 2. The patient/caregiver will demonstrate understanding of compensatory strategies for improved swallowing safety. SLP provided edu re: importance of exercises, strict aspiration precautions, FFWP guidelines, current diet recs. Goal 3. The patient will tolerate instrumental swallowing procedure Ongoing- not clinically warranted at this time, will complete in future. Goal 4: The pt will complete laryngeal/pharyngeal strengthening exercises in 10/10 trials, min cues.     Exercises completed with vital stim/NMES in place:  -placement 3a at 8.0 mA for 20 mins  -pt tolerated without any adverse reactions or side effects    -effortful swallow: x20  -isometric tongue tip hold for 5 secs: x20  -jaw jut hold for 5 secs: x20  -CTAR hold for 5 secs: x20  -falsetto \"ee\": x20      *New goal added 07/01/22*  Goal 5. The pt will complete OME's for improved oral motor function, coordination, strength, and ROM in 10/10 trials, min cues. Exercises completed with vital stim/NMES in place:  -placement 4a at 7.0mA for 20 mins  -pt tolerated without any adverse reactions or side effects    OME's given min cues:   -open mouth wide and hold 5 sec: x20  -smile: x20  -pucker: x20  -smile/pucker: x20  -cheek puff hold for 3 seconds: x20  -lingual lateralization corner to corner of lips: x20  -lingual lateralization cheek to cheek: x20  -cheek/eye squint: x20       Speech/Lang/Cog goals:  Short-term Goals  Timeframe for Short-term Goals: 14 days (7/13/22)    Goal 1: The pt will complete graded recall tasks with 90% accuracy, min-no cues. Functional recall of 5 unfamiliar items:  -100% acc indep    Short term recall of the same 5 items after a 10 min delay:  -100% acc indep    Goal 2: The pt will complete executive function tasks (i.e. planning, deductive reasoning, inferential, functional organization tasks) with 90% accuracy no cues. Math word problems:  -100% acc indep     Goal 3: The pt will complete high-level problem-solving tasks (*particularly related to safety scenarios) with 95% accuracy, no cues. Did not target. Goal 4: The pt will complete articulatory agility tasks with 70% accuracy, mod cues. Did not target. Goal 5: The pt will effectively use compensatory speech strategies during structured speech tasks with 70% accuracy, mod cues. SLP edu pt re: SLOB speech strategies - slow, loud, over-exaggerate, breath support. Pt benefited from mod cues in order to use during conversational speech. Pt's speech judged to be ~75% intelligible today, overall improved carryover of slow rate and over articulation.          Other areas targeted: N/a    Education:   Edu provided re: speech intelligibility strategies, importance of exercises, rationale for cog tx tasks      Safety Devices: [x] Call light within reach  [x] Chair alarm activated  [] Bed alarm activated  [] Other: [] Call light within reach  [] Chair alarm activated  [] Bed alarm activated  [] Other:    Assessment: Pt pleasant and cooperative, agreeable to participate. Pt tolerated vital stim/NMES placement 4a at 7.0mA for 20 mins and placement 3a at 8.0mA for 20 mins without any difficulties. Pt completed all oral motor and pharyngeal/laryngeal strengthening exercises with vital stim/NMES in place. Oral care completed, FFWP implemented. Pt presenting with delayed throat clear and wet vocal quality on 3/15 trials. Reviewed strict aspiration precautions. Pt's overall speech intelligibility improved today with indep carryover of strategies, judged to be ~75% intelligible. Pt completed all cognitive tasks (functional/short term recall and math word problems) with 100% acc indep. Pt is making good progress towards all goals and remains strongly motivated to improve. Continue goals above. Plan: Continue as per plan of care. Additional Information:     Barriers toward progress: None  Discharge recommendations:  [] Home independently  [x] Home with assistance []  24 hour supervision  [] ECF [] Other:  Continued Tx Upon Discharge: ? [] Yes [] No [x] TBD based on progress while on ARU [] Vital Stim indicated [] Other:   Estimated discharge date: 07/22/22    Interventions used this date:  [x] Speech/Language Treatment  [] Instruction in HEP [] Group [x] Dysphagia Treatment [] Cognitive Treatment   [] Other: Total Time Breakdown / Charges    Time in Time out Total Time / units   Cognitive Tx 1020 1030 10 min/ 1 unit    Speech Tx 1010 1020 10 min/ 1 unit    Dysphagia Tx 0930 1010 40 min/ 1 unit        Electronically Signed by     Michaelle Oconnor. A CCC-SLP  Speech-Language Pathologist  [id number]

## 2022-07-07 NOTE — PROGRESS NOTES
Estiven Barahona  7/7/2022  [id number]    Chief Complaint: Acute CVA (cerebrovascular accident) [facility])    Subjective:   No acute events overnight. Today Fidencio Mckinney is seen in his room with therapy. He reports continued dry eye. Will add eye drops. He denies other acute complaints at this time. ROS: denies f/c, n/v, cp, sob    Objective:  Patient Vitals for the past 24 hrs:   BP Temp Temp src Pulse Resp SpO2   07/07/22 0830 113/75 97.8 °F (36.6 °C) Oral 93 16 94 %   07/06/22 2115 132/87 98.5 °F (36.9 °C) Oral 83 16 --     Gen: No distress, pleasant. HEENT: Normocephalic, atraumatic. CV: extremities well perfused  Resp: No respiratory distress. Abd: Soft, nontender   Ext: No edema. Neuro: Alert, oriented, appropriately interactive. Ambulated in hallway with therapy assistance    Wt Readings from Last 3 Encounters:   06/29/22 145 lb 15.1 oz (66.2 kg)   02/14/22 145 lb (65.8 kg)       Laboratory data:   Lab Results   Component Value Date    WBC 5.0 07/07/2022    HGB 15.5 07/07/2022    HCT 44.2 07/07/2022    .9 (H) 07/07/2022     07/07/2022       Lab Results   Component Value Date/Time     07/07/2022 08:19 AM    K 4.1 07/07/2022 08:19 AM     07/07/2022 08:19 AM    CO2 26 07/07/2022 08:19 AM    BUN 13 07/07/2022 08:19 AM    CREATININE 0.7 07/07/2022 08:19 AM    GLUCOSE 120 07/07/2022 08:19 AM    CALCIUM 10.3 07/07/2022 08:19 AM        Therapy progress:  PT  Position Activity Restriction  Other position/activity restrictions: R sided weakness; up with assist; Nectar thick liquids  Objective     Sit to Stand:  Moderate Assistance  Stand to sit: Moderate Assistance  Bed to Chair: Moderate assistance (to L with cues for safety)  Device: Parallel Bars  Assistance: Minimal assistance  Distance: 8' and 20'  OT  PT Equipment Recommendations  Equipment Needed: No  Toilet - Technique: Ambulating  Equipment Used: Standard toilet (with L grab bar)  Toilet Transfers Comments: cues for safe hand placement; decreased proprioception when going to initate sit down to toilet  Assessment        SLP  Current Diet : Soft and Bite-Sized             Body mass index is 18.74 kg/m². Assessment and Plan:  Jeffery Medina is a 60year old male with a past medical history significant for left sided stroke due to ICA dissection (2018) without residual deficits, HLD, and nicotine dependence who presented to  on 6/26/22 with acute onset of right face, arm, and leg weakness, found to have acute CVA. He was admitted to [facility] on 6/29/22 due to functional deficits below his baseline.      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 Follow bowel movements. Enema or suppository if needed.      Bladder: Check PVR x 3. [facility] if PVR > 200ml or if any volume is > 500 ml.      Sleep: Trazodone provided prn.      Follow up appointments: PCP, Stroke clinic    Services: home health versus outpatient PT, OT, ST  EDOD: 7/22/22    Wilfred Knox.  José Antonio Robles MD 7/7/2022, 11:49 AM

## 2022-07-08 PROCEDURE — 97110 THERAPEUTIC EXERCISES: CPT

## 2022-07-08 PROCEDURE — 6370000000 HC RX 637 (ALT 250 FOR IP): Performed by: STUDENT IN AN ORGANIZED HEALTH CARE EDUCATION/TRAINING PROGRAM

## 2022-07-08 PROCEDURE — 6360000002 HC RX W HCPCS: Performed by: STUDENT IN AN ORGANIZED HEALTH CARE EDUCATION/TRAINING PROGRAM

## 2022-07-08 PROCEDURE — 92526 ORAL FUNCTION THERAPY: CPT

## 2022-07-08 PROCEDURE — 97116 GAIT TRAINING THERAPY: CPT

## 2022-07-08 PROCEDURE — 97112 NEUROMUSCULAR REEDUCATION: CPT

## 2022-07-08 PROCEDURE — 97129 THER IVNTJ 1ST 15 MIN: CPT

## 2022-07-08 PROCEDURE — 1280000000 HC REHAB R&B

## 2022-07-08 RX ADMIN — ACETAMINOPHEN 650 MG: 325 TABLET ORAL at 19:55

## 2022-07-08 RX ADMIN — ENOXAPARIN SODIUM 40 MG: 100 INJECTION SUBCUTANEOUS at 08:29

## 2022-07-08 RX ADMIN — TRAZODONE HYDROCHLORIDE 50 MG: 50 TABLET ORAL at 19:55

## 2022-07-08 RX ADMIN — ATORVASTATIN CALCIUM 10 MG: 10 TABLET, FILM COATED ORAL at 19:55

## 2022-07-08 RX ADMIN — POLYVINYL ALCOHOL 2 DROP: 14 SOLUTION/ DROPS OPHTHALMIC at 19:55

## 2022-07-08 RX ADMIN — ASPIRIN 81 MG: 81 TABLET, CHEWABLE ORAL at 08:29

## 2022-07-08 RX ADMIN — POLYVINYL ALCOHOL 2 DROP: 14 SOLUTION/ DROPS OPHTHALMIC at 08:31

## 2022-07-08 ASSESSMENT — PAIN DESCRIPTION - DESCRIPTORS: DESCRIPTORS: ACHING

## 2022-07-08 ASSESSMENT — PAIN SCALES - GENERAL
PAINLEVEL_OUTOF10: 0
PAINLEVEL_OUTOF10: 0
PAINLEVEL_OUTOF10: 3
PAINLEVEL_OUTOF10: 0

## 2022-07-08 ASSESSMENT — PAIN DESCRIPTION - ORIENTATION: ORIENTATION: RIGHT

## 2022-07-08 ASSESSMENT — PAIN DESCRIPTION - LOCATION: LOCATION: SHOULDER

## 2022-07-08 NOTE — PLAN OF CARE
Problem: Safety - Adult  Goal: Free from fall injury  7/7/2022 2323 by Skinny Quintero RN  Outcome: Progressing  7/7/2022 1908 by Jayy Bergeron  Outcome: Progressing

## 2022-07-08 NOTE — PROGRESS NOTES
Occupational Therapy  Facility/Department: [facility]  Rehabilitation Occupational Therapy Daily Treatment Note    Date: 22  Patient Name: Korin Jaquez       Room: [facility]7/3405-58  MRN: [id number]  Account: [de-identified]   : 1960  (63 y.o.) Gender: male                    Past Medical History:  has a past medical history of CVA (cerebral vascular accident) ([address] Past Surgical History:   has no past surgical history on file. Restrictions  Restrictions/Precautions: Fall Risk  Other position/activity restrictions: R sided weakness; up with assist; Nectar thick liquids    Subjective  Subjective: pt up in chair aggreeable to therapy; no pain reported  Restrictions/Precautions: Fall Risk             Objective               ADL  Feeding  Assistance Level: Maximum assistance;Minimal assistance  Skilled Clinical Factors: denied need for additional ADLs this date; trialed built up handle on R utensil limited success secondary to limited AROM to bring utensil to mouth. addressed opening containers with therapist initating 25%, Left hand pulling lid off remaining 75% ind with max A to use R hand to stabilize container in hand 3/3 trials              Neuromuscular Education  Neuromuscular education: Yes  NDT Treatment: Gait ;Upper extremity;Standing;Sitting;Quadruped; Lower extremity  Facilitation techniques: OT facilitated WB on BUE through fisted palms during qped position while PT facilitate glute and hip positioning. 4pt quad>3pt WB with Alexei for WS to reach UE shoulder flexion x5 trials each arm, max fatigue. Tall kneelign with large therapy ball w/ PT facilitating hip positioning and glute activation and OT facilitating UE wt shifts (lateral and forward flexion). Pt WB on extended BUE for isometric hold 10 seconds against resistance from therapist 5/5 trials in tall kneeling for core activation and BUE strengthening with max fatigue.  in tall kneeling, completed modified ab roll out to reach forward with BUE on ball to trunk flexion then right self to tall kneeling 5x max fatigue. Single leg standing balance: x1 minute on LLE with R foot on dynamic surface, no UE support, pt able to reach with RUE 10x in and out of SERENE with no LOB and ability to stablize self with balance falter. Pt unable to maintain full knee extension with WB on RLE for single leg standing balance with L foot support on dynamic surface. with max A x2 for 30 seconds, maintined upright position. Weight Bearing  Weight Bearing Technique: Yes  RUE Weight Bearing: Extended arm standing;Quadriped; High kneeling  LUE Weight Bearing: Quadriped; High kneeling;Extended arm standing  OT Exercises  Exercise Treatment: seated for AAROM  A/AROM Exercises: towel glides: horizontal abd/add 10 reps x 2 sets, forward reach/posterior delt row 10reps x 2 sets. AAROM 10reps x 2 sets wrist flex/ext gravity eliminated      Fx ambulation: ind set-up R hand in walker room<>gym with CGA. Assessment  Assessment  Assessment: First Session: Pt completed BUE gravity eliminated HEP with focus on RUE only. Pt focus on opening food containers with use of B hands with mod-max A. Pt CGA for all mobility and transfers with clamshell splint applied to RW. Second session: Co-tx w/ PT to facilitate safe mobility and progression towards goals. Pt tolerated qped position well this date when incorporating alternating arm movements focusing on WS to 3pt WB position, improved ability to maintain position with WS to R side. . Pt reports that static hold in qped during previous session was \"uncomfortable\" and inc cramping in LLE. multiple breaks required. PT facilitated LE positioning and mm activation appropriate for positioning (qped vs tall kneeling vs single leg stance). Pt completed activity in tall kneeling and single leg standing with focus on balance, core strengthening, and trunk righting. Pt continues to demo increased difficulties with WS to both RUE and LE. continue POC.   Activity Tolerance: Patient tolerated treatment well  Discharge Recommendations: Home with Home health OT;24 hour supervision or assist;Outpatient OT  OT Equipment Recommendations  Equipment Needed: Yes  Walker: Rolling  Wheelchair: Standard; Wheelchair Cushion Standard  ADL Assistive Devices: Toileting - 3-in-1 Commode;Grab Bars - shower; Shower Chair with back; Reacher;Sock-Aid Hard;Elastic Shoe Laces  Other: Pt will benefit from a BSC to be used in the close confinements of the bedroom overnight to increase independence and safety during toileting considering pt's R sided hemiparesis and decreased dynamic balance. Safety Devices  Safety Devices in place: Yes  Type of devices: Call light within reach; Chair alarm in place; Left in chair; All fall risk precautions in place; Patient at risk for falls;Gait belt    Patient Education  Education  Education Given To: Patient  Education Provided: Role of Therapy; ADL Function;Equipment; Mobility Training;Transfer Training;Energy Conservation; Safety;Precautions;Plan of Care;DME/Home Modifications; Home Exercise Program  Education Provided Comments: disease specific: built up handle, BUE HEP, WS techniques, dynamic vs standing balance. Education Method: Verbal;Demonstration  Barriers to Learning: Cognition  Education Outcome: Verbalized understanding;Continued education needed    Plan  Plan  Times per Week: 5 of 7 days  Times per Day: Daily  Specific Instructions for Next Treatment: trial of dressing stick, opening containers, cutting food, address STG  Current Treatment Recommendations: Strengthening;Balance training;Functional mobility training; Endurance training;Neuromuscular re-education;Positioning;Equipment evaluation, education, & procurement; Modalities; Patient/Caregiver education & training; Safety education & training;Self-Care / ADL; Home management training;Cognitive/Perceptual training;Coordination training;Sensory integraion    Goals  Patient Goals   Patient goals : \"to get back to normal\"  Short Term Goals  Time Frame for Short term goals: within 7 days by 7/6/22  Short Term Goal 1: Pt will perform toilet transfer with Mod A and LRAD-  Short Term Goal 2: Pt will perform LB dressing with Min A and AE PRN- goal progressing 7/6  Short Term Goal 3: Pt will perform UB dressing with Min A and appropriate katlyn techniques- GOAL MET 7/7  Short Term Goal 4: Pt will perform functional dynamic balance activity in stance for >5 minutes with Min A and LRAD- GOAL MET 7/6  Short Term Goal 5: Pt will perform 2-3 grooming tasks with Min A for during tasks standing vs sitting with LRAD and Min A for balance  Long Term Goals  Time Frame for Long term goals : within 21 days by 7/20/22  Long Term Goal 1: Pt will perform TB dressing Mod I  Long Term Goal 2: Pt will perfrom TB bathing Mod I and AE PRN  Long Term Goal 3: Pt will perform simple IADL task in stance with LRAD for >7 minutes with supervision  Long Term Goal 4: Pt will self feed with R hand with Min A  Long Term Goal 5: Pt will increase Block and Box score on R hand by >4 blocks- goal progressing as of assessment 7/7 R hand = 1 block in 60 seconds.       Therapy Time   Individual Concurrent Group Co-treatment   Time In 1030     1100   Time Out 1100     1130   Minutes 30     30   Timed Code Treatment Minutes: Marcio 1540, [place]

## 2022-07-08 NOTE — PROGRESS NOTES
Speech Language Pathology  [facility]: ACUTE REHAB UNIT  SPEECH-LANGUAGE PATHOLOGY      [x] Daily  [] Weekly Care Conference Note  [] Discharge    Taniya Soto      PZV:7/20/0395  KS:[id number]  Rehab Dx/Hx: Acute CVA (cerebrovascular accident) ([facility] [address] [I63.9]    Precautions: falls  Home situation: lives with brother, manages own finances, did not take any meds PTA, active   ST Dx: [] Aphasia  [x] Dysarthria  [] Apraxia   [] Oropharyngeal dysphagia [x] Cognitive Impairment  [] Other:   Date of Admit: 6/29/2022  Room #: 0166/0166-01    Current functional status (updated daily):         Pt being seen for : [x] Speech/Language Treatment  [] Dysphagia Treatment [x] Cognitive Treatment  [] Other:  Communication: []WFL  [] Aphasia  [x] Dysarthria  [] Apraxia  [] Pragmatic Impairment [] Non-verbal  [] Hearing Loss  [] Other:   Cognition: [] WFL  [x] Mild  [] Moderate  [] Severe [] Unable to Assess  [] Other:  Memory: [] WFL  [x] Mild  [] Moderate  [] Severe [] Unable to Assess  [] Other:  Behavior: [x] Alert  [x] Cooperative  [x]  Pleasant  [] Confused  [] Agitated  [] Uncooperative  [] Distractible [] Motivated  [] Self-Limiting [] Anxious  [] Other:  Endurance:  [x] Adequate for participation in SLP sessions  [] Reduced overall  [] Lethargic  [] Other:  Safety: [x] No concerns at this time  [] Reduced insight into deficits  []  Reduced safety awareness [] Not following call light procedures  [] Unable to Assess  [] Other:    Current Diet Order:ADULT DIET;  Dysphagia - Soft and Bite Sized; Mildly Thick ([place])  ADULT ORAL NUTRITION SUPPLEMENT; Lunch, Dinner; Frozen Oral Supplement  ADULT ORAL NUTRITION SUPPLEMENT; Breakfast, Dinner; Standard 4 oz Oral Supplement  Swallowing Precautions: upright for all intake, stay upright for at least 30 mins after intake, small bites/sips, set-up assistance / distant supervision with intake, alternate bites/sips, slow rate of intake, general aspiration precautions and hold PO and contact SLP if s/s of aspiration or worsening respiratory status develop.           Date: 7/8/2022      Tx session 1  [id number] Tx session 2  All tx needs met in session 1    Total Timed Code Min 15    Total Treatment Minutes 60    Individual Treatment Minutes 60    Group Treatment Minutes 0    Co-Treat Minutes 0    Variance/Reason:  0    Pain Denies    Pain Intervention [] RN notified  [] Repositioned  [] Intervention offered and patient declined  [x] N/A  [] Other: [] RN notified  [] Repositioned  [] Intervention offered and patient declined  [] N/A  [] Other:   Subjective     Pt alert and cooperative, agreeable to tx. Pt upright in bedside chair for session. Objective:  Goals     Dysphagia Goals:  Short-term Goals  Timeframe for Short-term Goals: 14 days (7/13/22)    Goal 1. The patient will tolerate recommended diet without observed clinical signs of aspiration. Oral care completed with pt; swish and spit concept with thin liquids, no overt s/s of aspiration. FFWP:  -TL via cup  -x20 cup sips  -pt presented with wet vocal quality and delayed throat clearing on 5/20 sips  -pt stated \"I clear my throat all the time, this isn't new since the stroke\"    Chemo dimas trials:  -adequate mastication, A-P transit, minimal residue in oral cavity post swallow that pt was able to clear with use of liquid wash  -Pt's diet upgraded to IDDSI Level 7 regular solids this date, diet order changed in Epic, RN notified       Goal 2. The patient/caregiver will demonstrate understanding of compensatory strategies for improved swallowing safety. SLP provided edu re: importance of exercises, strict aspiration precautions, FFWP guidelines, current diet recs. Goal 3. The patient will tolerate instrumental swallowing procedure Ongoing- not clinically warranted at this time, will complete in future. Goal 4: The pt will complete laryngeal/pharyngeal strengthening exercises in 10/10 trials, min cues. Exercises completed with vital stim/NMES in place:  -placement 3a at 7.5mA for 30 mins mins  -pt tolerated without any adverse reactions or side effects    -effortful swallow: x30  -isometric tongue tip hold for 5 secs: x30  -jaw jut hold for 5 secs: x30  -resistive jaw opening hold for 5 secs: x30  -falsetto \"ee\": x20      *New goal added 07/01/22*  Goal 5. The pt will complete OME's for improved oral motor function, coordination, strength, and ROM in 10/10 trials, min cues. Did not target. Speech/Lang/Cog goals:  Short-term Goals  Timeframe for Short-term Goals: 14 days (7/13/22)    Goal 1: The pt will complete graded recall tasks with 90% accuracy, min-no cues. Did not target. Goal 2: The pt will complete executive function tasks (i.e. planning, deductive reasoning, inferential, functional organization tasks) with 90% accuracy no cues. Did not target. Goal 3: The pt will complete high-level problem-solving tasks (*particularly related to safety scenarios) with 95% accuracy, no cues. Problem solving/safety situations:  -100% acc indep     Goal 4: The pt will complete articulatory agility tasks with 70% accuracy, mod cues. Did not directly target. Goal 5: The pt will effectively use compensatory speech strategies during structured speech tasks with 70% accuracy, mod cues. SLP edu pt re: SLOB speech strategies - slow, loud, over-exaggerate, breath support. Pt benefited from mod cues in order to use during conversational speech. Pt's speech judged to be ~75% intelligible today, overall improved carryover of slow rate and over articulation.      Other areas targeted: N/a    Education:   Edu provided re: upgraded diet recs, safe swallow strategies      Safety Devices: [x] Call light within reach  [x] Chair alarm activated  [] Bed alarm activated  [] Other: [] Call light within reach  [] Chair alarm activated  [] Bed alarm activated  [] Other:    Assessment: Pt pleasant and cooperative, agreeable to participate. Pt tolerated vital stim/NMES placement 3a, 7.5mA for 40 mins. Oral care completed, FFWP implemented. Pt presenting with delayed throat clear and wet vocal quality on 5/20 trials (however pt reported he cleared his throat a lot prior to CVA) Reviewed strict aspiration precautions. Pt's solids upgraded to regular solids today, tolerating marcelo cracker trials without any difficulty. Pt's overall speech intelligibility improved again today with indep carryover of strategies, judged to be ~75% intelligible. Pt completed safety situations/problem solving task with 100% acc indep. Pt continues to make good progress towards all goals and is strongly motivated to improve. Continue goals above. Plan: Continue as per plan of care. Additional Information:     Barriers toward progress: None  Discharge recommendations:  [] Home independently  [x] Home with assistance []  24 hour supervision  [] ECF [] Other:  Continued Tx Upon Discharge: ? [] Yes [] No [x] TBD based on progress while on ARU [] Vital Stim indicated [] Other:   Estimated discharge date: 07/22/22    Interventions used this date:  [x] Speech/Language Treatment  [] Instruction in HEP [] Group [x] Dysphagia Treatment [] Cognitive Treatment   [] Other: Total Time Breakdown / Charges    Time in Time out Total Time / units   Cognitive Tx 1015 1030 15 min/ 1 unit    Speech Tx -- -- --   Dysphagia Tx 0930 1015 45 min/ 1 unit        Electronically Signed by     Severa Rex. A CCC-SLP  Speech-Language Pathologist  [id number]

## 2022-07-08 NOTE — PROGRESS NOTES
Physical Therapy  Facility/Department: [facility]  Rehabilitation Physical Therapy Treatment Note    NAME: Guy Balderas  : 1960 (63 y.o.)  MRN: [id number]  CODE STATUS: Full Code    Date of Service: 22       Restrictions:  Restrictions/Precautions: Fall Risk  Position Activity Restriction  Other position/activity restrictions: R sided weakness; up with assist; Nectar thick liquids     SUBJECTIVE  Subjective  Subjective: Pt sitting up in chair upon approach. Agreeable to PT tx  Pain: Pt denies pain this date         OBJECTIVE  Cognition  Overall Cognitive Status: Exceptions  Arousal/Alertness: Appropriate responses to stimuli  Following Commands: Follows one step commands with repetition; Follows one step commands consistently  Attention Span: Appears intact  Memory: Appears intact  Safety Judgement: Decreased awareness of need for assistance;Decreased awareness of need for safety  Problem Solving: Assistance required to generate solutions;Assistance required to identify errors made  Insights: Decreased awareness of deficits  Initiation: Requires cues for some  Sequencing: Requires cues for some  Cognition Comment: impulsive tendencies; decreased awareness of RUE during mobility  Orientation  Overall Orientation Status: Within Functional Limits  Orientation Level: Oriented to time;Oriented to situation;Oriented to person;Oriented to place    Functional Mobility  Bed Mobility  Overall Assistance Level: Stand By Assist  Roll Left  Assistance Level: Stand by assist  Roll Right  Assistance Level: Stand by assist  Sit to Supine  Assistance Level: Stand by assist  Supine to Sit  Assistance Level: Stand by assist  Balance  Sitting Balance: Supervision  Standing Balance: Contact guard assistance  Standing Balance  Activity: See therex  Transfers  Additional Factors: Hand placement cues; Increased time to complete;Verbal cues  Device: Walker (w/ R clamshell)  Sit to Stand  Assistance Level: Minimal assistance  Skilled Clinical Factors: cues for hand placement and RLE placement and safety of overall technique  Stand to Sit  Assistance Level: Minimal assistance;Contact guard assist  Skilled Clinical Factors: cues for hand placement and sequencing; poor eccentric control. pt req Omero from w/ t/f from softer surfaces (chair, bed), but CGA for firmer surfaces. Bed To/From Chair  Technique: Stand pivot  Assistance Level: Contact guard assist      Environmental Mobility  Ambulation  Surface: Level surface  Device: Rolling walker  Distance: 150 ft x 3  Activity: Within Unit  Assistance Level: Minimal assistance  Gait Deviations: Decreased step length right;Decreased heel strike right; Unsteady gait; Path deviations; Slow hortencia;Decreased weight shift bilateral  Skilled Clinical Factors: Pt req tactile cues to faciliate L wt shift and eventually responds well to verbal cues to improve R foot clearance. PT Exercises  Exercise Treatment: Pt performed: standing marches w/ BLE for 2x10 bilat, Sit to stand 2x10 w/ cues for proper RLE/RUE placement    Second Session:     Neuromuscular Education  Neuromuscular Education: Yes  NDT Treatment: Gait ;Upper extremity;Standing;Sitting;Quadruped; Lower extremity  Facilitation techniques:  - OT facilitated WB on BUE through wrists during qped position while PT facilitate glute and hip positioning. Pt was provided w/ cues for wt shift facilitation while OT facilitate alt shoulder flexion in qped position.   -Pt also perform tall kneeling at blue ball, W/ PT facilitating hip positioning and glute activation and OT facilitating UE wt shifts (lateral and forward flexion). - Pt then moved to standing and performed single leg stance on L w/ RLE balancing on bolster. OT facilitated BUE movement during SLS. Pt then attempted activity standing on RLE, but was unable to maintain R quad/glute contraction and req Mod-Max cues from PT to maintain upright position.     Gait:   Pt performed 150 ft of amb x2 w/ CGA and verbal cues for proper sequencing using RLE, especially during turns. ASSESSMENT/PROGRESS TOWARDS GOALS  Vital Signs  Heart Rate: 84  BP: 110/70  BP Location: Right upper arm  MAP (Calculated): 83.33  SpO2: 95 %  O2 Device: None (Room air)    Assessment  Assessment:   First session: Pt presents towards goals well this date. Pt req CGA for STS from firm surfaces and Omero from softer surfaces. Pt requires frequent cues for hand and RLE positioning during mobility. Pt tolerates an inc in amb this date w/ dec in cues req for proper sequencing and attention to RLE. Pt will cont to benefit from skilled PT services to improve function towards goals. Pt will cont to benefit from 24 hr sup and continued therapy after d/c. Second session: Co-tx w/ OT to facilitate safe mobility and improve efficiency. Pt tolerated qped position well this date when incorporating alternating arm movements, facilitated by OT. Pt reports that static hold in qped during previous session was \"uncomfortable\" and inc cramping in LLE. PT facilitated LE positioning and mm activation appropriate for positioning (qped vs tall kneeling vs single leg stance).   Activity Tolerance: Patient tolerated treatment well  Discharge Recommendations: 24 hour supervision or assist;Patient would benefit from continued therapy after discharge  PT Equipment Recommendations  Equipment Needed: No    Goals  Patient Goals   Patient goals : \"to be able to walk again and use my R arm\"  Short Term Goals  Time Frame for Short term goals: 10 days (7/9/22)  Short term goal 1: Pt will perform bed mobility with min A  -7/08: goal met: SBA  Short term goal 2: Pt will perform transfers with min A  -7/08: goal met: CGA-Omero  Short term goal 3: Pt will ambulate 25' with quad cane and mod A  -7/05 Goal met  Short term goal 4: Pt will negotiate 4 steps with HR and mod A  -7/05 Goal met  Long Term Goals  Time Frame for Long term goals : 21 days (7/20/22)  Long term goal 1: Pt will perform bed mobility mod I  Long term goal 2: Pt will perform transfers with supervision  Long term goal 3: Pt will ambulate 48' with quad cane and CGA 7/05 update goal: Pt will ambulate 150' with RW and clamshell with supervision  Long term goal 4: Pt will propel wheelchair 150' mod I; 7/05 discontinue goal  Long term goal 5: Pt will negotiate 4 steps with HR and min A    PLAN OF CARE/SAFETY  Plan  Plan:  minutes of therapy at least 5 out of 7 days a week  Plan weeks: 21 days  Specific Instructions for Next Treatment: R katlyn strength training and continue mobility progression  Current Treatment Recommendations: Strengthening;ROM;Balance training;Functional mobility training;Transfer training; Endurance training; Wheelchair mobility training;Gait training;Stair training;Neuromuscular re-education;Manual Therapy - Soft Tissue Mobilization;Patient/Caregiver education & training; Safety education & training;Home exercise program;Equipment evaluation, education, & procurement; Modalities; Therapeutic activities;ADL/Self-care training;IADL training  Safety Devices  Type of Devices: All fall risk precautions in place;Call light within reach; Chair alarm in place;Gait belt;Nurse notified; Left in chair;Patient at risk for falls    EDUCATION  Education  Education Given To: Patient  Education Provided: Role of Therapy; Safety;Equipment; Mobility Training; Fall Prevention Strategies;Transfer Training;Plan of Care;Precautions; Home Exercise Program  Education Provided Comments: Pt educated on safety w/ sit to stand and t/fs  Education Method: Demonstration;Verbal  Barriers to Learning: Cognition  Education Outcome: Verbalized understanding;Continued education needed        Therapy Time   Individual Concurrent Group Co-treatment   Time In 0900     1100   Time Out 0930     1130   Minutes 30     30     Timed Code Treatment Minutes: [address] [place], [place], 07/08/22 at 2:22 PM

## 2022-07-08 NOTE — PROGRESS NOTES
Fermin Vega  7/8/2022  [id number]    Chief Complaint: Acute CVA (cerebrovascular accident) [facility])    Subjective:   No acute events overnights. Today Meggan Gu is seen in his room working with therapies. He reports dry eye is improved with drops. He denies other acute complaints at this time. ROS: denies f/c, n/v, cp, sob    Objective:  Patient Vitals for the past 24 hrs:   BP Temp Temp src Pulse Resp SpO2   07/08/22 1211 110/70 -- -- 84 -- 95 %   07/08/22 0950 -- -- -- 86 -- --   07/08/22 0815 104/72 97.6 °F (36.4 °C) Oral 86 16 96 %   07/07/22 1915 106/70 97.7 °F (36.5 °C) Oral 83 16 96 %     Gen: No distress, pleasant. HEENT: Normocephalic, atraumatic. CV: extremities well perfused  Resp: No respiratory distress. Abd: Soft, nontender   Ext: No edema. Neuro: Alert, oriented, appropriately interactive. Sits on edge of bed without LOB    Wt Readings from Last 3 Encounters:   06/29/22 145 lb 15.1 oz (66.2 kg)   02/14/22 145 lb (65.8 kg)       Laboratory data:   Lab Results   Component Value Date    WBC 5.0 07/07/2022    HGB 15.5 07/07/2022    HCT 44.2 07/07/2022    .9 (H) 07/07/2022     07/07/2022       Lab Results   Component Value Date/Time     07/07/2022 08:19 AM    K 4.1 07/07/2022 08:19 AM     07/07/2022 08:19 AM    CO2 26 07/07/2022 08:19 AM    BUN 13 07/07/2022 08:19 AM    CREATININE 0.7 07/07/2022 08:19 AM    GLUCOSE 120 07/07/2022 08:19 AM    CALCIUM 10.3 07/07/2022 08:19 AM        Therapy progress:  PT  Position Activity Restriction  Other position/activity restrictions: R sided weakness; up with assist; Nectar thick liquids  Objective     Sit to Stand:  Moderate Assistance  Stand to sit: Moderate Assistance  Bed to Chair: Moderate assistance (to L with cues for safety)  Device: Parallel Bars  Assistance: Minimal assistance  Distance: 8' and 20'  OT  PT Equipment Recommendations  Equipment Needed: No  Toilet - Technique: Ambulating  Equipment Used: Standard toilet (with L grab bar)  Toilet Transfers Comments: cues for safe hand placement; decreased proprioception when going to initate sit down to toilet  Assessment        SLP  Current Diet : Soft and Bite-Sized             Body mass index is 18.74 kg/m². Assessment and Plan:  Trisha Lagunas is a 60year old male with a past medical history significant for left sided stroke due to ICA dissection (2018) without residual deficits, HLD, and nicotine dependence who presented to  on 6/26/22 with acute onset of right face, arm, and leg weakness, found to have acute CVA. He was admitted to [facility] on 6/29/22 due to functional deficits below his baseline.      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 Follow bowel movements. Enema or suppository if needed.      Bladder: Check PVR x 3. [facility] if PVR > 200ml or if any volume is > 500 ml.      Sleep: Trazodone provided prn.      Follow up appointments: PCP, Stroke clinic    Services: home health versus outpatient PT, OT, ST  EDOD: 7/22/22    Kristin Wood.  Sharlene Alvarez MD 7/8/2022, 2:27 PM

## 2022-07-08 NOTE — PLAN OF CARE
Problem: Safety - Adult  Goal: Free from fall injury  7/8/2022 1002 by Raúl Myers RN  Outcome: Progressing  7/7/2022 2323 by Jessy Cordero RN  Outcome: Progressing

## 2022-07-09 PROCEDURE — 1280000000 HC REHAB R&B

## 2022-07-09 PROCEDURE — 6370000000 HC RX 637 (ALT 250 FOR IP): Performed by: STUDENT IN AN ORGANIZED HEALTH CARE EDUCATION/TRAINING PROGRAM

## 2022-07-09 PROCEDURE — 6360000002 HC RX W HCPCS: Performed by: STUDENT IN AN ORGANIZED HEALTH CARE EDUCATION/TRAINING PROGRAM

## 2022-07-09 RX ADMIN — ENOXAPARIN SODIUM 40 MG: 100 INJECTION SUBCUTANEOUS at 09:10

## 2022-07-09 RX ADMIN — ASPIRIN 81 MG: 81 TABLET, CHEWABLE ORAL at 09:11

## 2022-07-09 RX ADMIN — ATORVASTATIN CALCIUM 10 MG: 10 TABLET, FILM COATED ORAL at 21:18

## 2022-07-09 RX ADMIN — POLYVINYL ALCOHOL 2 DROP: 14 SOLUTION/ DROPS OPHTHALMIC at 09:11

## 2022-07-09 RX ADMIN — TRAZODONE HYDROCHLORIDE 50 MG: 50 TABLET ORAL at 21:18

## 2022-07-09 ASSESSMENT — PAIN SCALES - GENERAL
PAINLEVEL_OUTOF10: 0

## 2022-07-09 NOTE — PLAN OF CARE
Problem: Safety - Adult  Goal: Free from fall injury  Flowsheets (Taken 7/9/2022 0658)  Free From Fall Injury: Instruct family/caregiver on patient safety

## 2022-07-09 NOTE — PLAN OF CARE
[facility]     Rehab Dx/Hx: Acute CVA (cerebrovascular accident) [facility]) [I63.9]   XEM:6/62/2289  BBS:[id number]  Date of Admit: 6/29/2022  Room #: 3460/7298-24    Shahzad Ball is on Physical Therapy's weekend walker list.     Patient participated in [facility]?:   [x] Yes; see information below. [] No; patient refused participation due to     Scheduled Therapies this weekend?:  [] Yes, patient received scheduled therapies in addition to weekend walker activity  [x] No, weekend therapy was not regularly scheduled for this patient    Activity Completed:   [x] Walking: hallways   [] Wheelchair mobility:  feet  [] Sat up in chair:  (amount of time)  [] Sat up in chair for meals  [] Other:     Assistance needed:   [] No assist / independent  [x] Supervision / Minimal assist  [] Max assistance  [] Other:     Device used:   [x] Walker:  type of walker  [] Cane  [] Wheelchair  [] None  [] Other    Patient tolerated activity well. Will monitor.      Signature:

## 2022-07-09 NOTE — PLAN OF CARE
Problem: Pain  Goal: Verbalizes/displays adequate comfort level or baseline comfort level  7/8/2022 1002 by Jeremías Ventura RN  Outcome: Progressing

## 2022-07-10 PROCEDURE — 6360000002 HC RX W HCPCS: Performed by: STUDENT IN AN ORGANIZED HEALTH CARE EDUCATION/TRAINING PROGRAM

## 2022-07-10 PROCEDURE — 6370000000 HC RX 637 (ALT 250 FOR IP): Performed by: STUDENT IN AN ORGANIZED HEALTH CARE EDUCATION/TRAINING PROGRAM

## 2022-07-10 PROCEDURE — 1280000000 HC REHAB R&B

## 2022-07-10 RX ADMIN — ENOXAPARIN SODIUM 40 MG: 100 INJECTION SUBCUTANEOUS at 08:01

## 2022-07-10 RX ADMIN — TRAZODONE HYDROCHLORIDE 50 MG: 50 TABLET ORAL at 19:56

## 2022-07-10 RX ADMIN — ASPIRIN 81 MG: 81 TABLET, CHEWABLE ORAL at 08:01

## 2022-07-10 RX ADMIN — POLYVINYL ALCOHOL 2 DROP: 14 SOLUTION/ DROPS OPHTHALMIC at 08:02

## 2022-07-10 RX ADMIN — ACETAMINOPHEN 650 MG: 325 TABLET ORAL at 20:02

## 2022-07-10 RX ADMIN — ATORVASTATIN CALCIUM 10 MG: 10 TABLET, FILM COATED ORAL at 19:56

## 2022-07-10 RX ADMIN — POLYVINYL ALCOHOL 2 DROP: 14 SOLUTION/ DROPS OPHTHALMIC at 19:55

## 2022-07-10 ASSESSMENT — PAIN SCALES - GENERAL
PAINLEVEL_OUTOF10: 0
PAINLEVEL_OUTOF10: 2

## 2022-07-11 LAB
ANION GAP SERPL CALCULATED.3IONS-SCNC: 11 MMOL/L (ref 3–16)
BASOPHILS ABSOLUTE: 0 K/UL (ref 0–0.2)
BASOPHILS RELATIVE PERCENT: 0.9 %
BUN BLDV-MCNC: 18 MG/DL (ref 7–20)
CALCIUM SERPL-MCNC: 10.4 MG/DL (ref 8.3–10.6)
CHLORIDE BLD-SCNC: 101 MMOL/L (ref 99–110)
CO2: 26 MMOL/L (ref 21–32)
CREAT SERPL-MCNC: 0.7 MG/DL (ref 0.8–1.3)
EOSINOPHILS ABSOLUTE: 0.1 K/UL (ref 0–0.6)
EOSINOPHILS RELATIVE PERCENT: 2 %
GFR AFRICAN AMERICAN: >60
GFR NON-AFRICAN AMERICAN: >60
GLUCOSE BLD-MCNC: 197 MG/DL (ref 70–99)
HCT VFR BLD CALC: 43.1 % (ref 40.5–52.5)
HEMOGLOBIN: 15.1 G/DL (ref 13.5–17.5)
LYMPHOCYTES ABSOLUTE: 1.2 K/UL (ref 1–5.1)
LYMPHOCYTES RELATIVE PERCENT: 22.5 %
MCH RBC QN AUTO: 37 PG (ref 26–34)
MCHC RBC AUTO-ENTMCNC: 34.9 G/DL (ref 31–36)
MCV RBC AUTO: 105.9 FL (ref 80–100)
MONOCYTES ABSOLUTE: 0.5 K/UL (ref 0–1.3)
MONOCYTES RELATIVE PERCENT: 9.3 %
NEUTROPHILS ABSOLUTE: 3.5 K/UL (ref 1.7–7.7)
NEUTROPHILS RELATIVE PERCENT: 65.3 %
PDW BLD-RTO: 13.8 % (ref 12.4–15.4)
PLATELET # BLD: 244 K/UL (ref 135–450)
PMV BLD AUTO: 8.8 FL (ref 5–10.5)
POTASSIUM REFLEX MAGNESIUM: 4.2 MMOL/L (ref 3.5–5.1)
RBC # BLD: 4.07 M/UL (ref 4.2–5.9)
SODIUM BLD-SCNC: 138 MMOL/L (ref 136–145)
WBC # BLD: 5.4 K/UL (ref 4–11)

## 2022-07-11 PROCEDURE — 97032 APPL MODALITY 1+ESTIM EA 15: CPT

## 2022-07-11 PROCEDURE — 97129 THER IVNTJ 1ST 15 MIN: CPT

## 2022-07-11 PROCEDURE — 80048 BASIC METABOLIC PNL TOTAL CA: CPT

## 2022-07-11 PROCEDURE — 97112 NEUROMUSCULAR REEDUCATION: CPT

## 2022-07-11 PROCEDURE — 92507 TX SP LANG VOICE COMM INDIV: CPT

## 2022-07-11 PROCEDURE — 97530 THERAPEUTIC ACTIVITIES: CPT

## 2022-07-11 PROCEDURE — 6370000000 HC RX 637 (ALT 250 FOR IP): Performed by: STUDENT IN AN ORGANIZED HEALTH CARE EDUCATION/TRAINING PROGRAM

## 2022-07-11 PROCEDURE — 6360000002 HC RX W HCPCS: Performed by: STUDENT IN AN ORGANIZED HEALTH CARE EDUCATION/TRAINING PROGRAM

## 2022-07-11 PROCEDURE — 1280000000 HC REHAB R&B

## 2022-07-11 PROCEDURE — 85025 COMPLETE CBC W/AUTO DIFF WBC: CPT

## 2022-07-11 PROCEDURE — 36415 COLL VENOUS BLD VENIPUNCTURE: CPT

## 2022-07-11 PROCEDURE — 97116 GAIT TRAINING THERAPY: CPT

## 2022-07-11 PROCEDURE — 92526 ORAL FUNCTION THERAPY: CPT

## 2022-07-11 PROCEDURE — 97110 THERAPEUTIC EXERCISES: CPT

## 2022-07-11 RX ADMIN — ASPIRIN 81 MG: 81 TABLET, CHEWABLE ORAL at 08:43

## 2022-07-11 RX ADMIN — TRAZODONE HYDROCHLORIDE 50 MG: 50 TABLET ORAL at 20:59

## 2022-07-11 RX ADMIN — ATORVASTATIN CALCIUM 10 MG: 10 TABLET, FILM COATED ORAL at 20:59

## 2022-07-11 RX ADMIN — POLYVINYL ALCOHOL 2 DROP: 14 SOLUTION/ DROPS OPHTHALMIC at 08:43

## 2022-07-11 RX ADMIN — ENOXAPARIN SODIUM 40 MG: 100 INJECTION SUBCUTANEOUS at 08:43

## 2022-07-11 RX ADMIN — POLYVINYL ALCOHOL 2 DROP: 14 SOLUTION/ DROPS OPHTHALMIC at 20:59

## 2022-07-11 ASSESSMENT — PAIN SCALES - GENERAL
PAINLEVEL_OUTOF10: 0

## 2022-07-11 NOTE — PROGRESS NOTES
Occupational Therapy  Facility/Department: [address] [place] [address]  Rehabilitation Occupational Therapy Daily Treatment Note    Date: 22  Patient Name: Kaur Vasquez       Room: 37/3386-77  MRN: [id number]  Account: [de-identified]   : 1960  (63 y.o.) Gender: male                    Past Medical History:  has a past medical history of CVA (cerebral vascular accident) ([address] Past Surgical History:   has no past surgical history on file. Restrictions  Restrictions/Precautions: Fall Risk;Swallowing - Thickened Liquids; General Precautions  Other position/activity restrictions: R sided weakness; up with assist; Nectar thick liquids    Subjective  Subjective: Pt in recliner, pleasant, reports R knee pain with walking (10) no present when resting, /74, HR 85, O2 sats 96% on RA. Restrictions/Precautions: Fall Risk;Swallowing - Thickened Liquids; General Precautions             Objective     Cognition  Overall Cognitive Status: Exceptions  Arousal/Alertness: Appropriate responses to stimuli  Following Commands: Follows one step commands with repetition; Follows one step commands consistently  Attention Span: Attends with cues to redirect  Memory: Appears intact  Safety Judgement: Decreased awareness of need for assistance;Decreased awareness of need for safety  Problem Solving: Assistance required to generate solutions;Assistance required to identify errors made  Insights: Decreased awareness of deficits  Initiation: Requires cues for some  Sequencing: Requires cues for some  Cognition Comment: impulsive  Orientation  Overall Orientation Status: Within Functional Limits   Perception  Overall Perceptual Status: Impaired  Unilateral Attention: Cues to attend to right side of body;Cues to maintain midline in sitting;Cues to maintain midline in standing  Initiation: Cues to initiate tasks  Motor Planning: Cues to use objects appropriately  Perseveration: Perseverates during conversation     ADL  Toileting  Assistance majority of attempts. Pt completed 3 reps of grasp Squigz and tossing across body to table on L with poor release to toss only 1-2 feet. Pt ambulated to/from gym with SBA/CGA with RW and clamshell on R side. Continue POC. Activity Tolerance: Patient tolerated treatment well  Discharge Recommendations: Home with Home health OT;24 hour supervision or assist;Outpatient OT  OT Equipment Recommendations  Equipment Needed: Yes  Mobility Devices: ADL Assistive Devices  ADL Assistive Devices: Toileting - 3-in-1 Commode;Grab Bars - shower; Shower Chair with back; Reacher;Sock-Aid Hard;Elastic Shoe Laces  Other: Pt will benefit from a BSC to be used in the close confinements of the bedroom overnight to increase independence and safety during toileting considering pt's R sided hemiparesis and decreased dynamic balance. Safety Devices  Safety Devices in place: Yes  Type of devices: Call light within reach; Chair alarm in place; Left in chair; All fall risk precautions in place; Patient at risk for falls;Gait belt    Patient Education  Education  Education Given To: Patient  Education Provided: Role of Therapy; ADL Function;Equipment; Mobility Training;Transfer Training;Energy Conservation; Safety;Precautions;Plan of Care;DME/Home Modifications; Home Exercise Program  Education Provided Comments: disease specific: built up handle, BUE HEP, dynamic vs standing balance, safety with foot placement  Education Method: Verbal;Demonstration  Barriers to Learning: Cognition  Education Outcome: Verbalized understanding;Continued education needed    Plan  Plan  Times per Week: 5 of 7 days  Current Treatment Recommendations: Strengthening;Balance training;Functional mobility training; Endurance training;Neuromuscular re-education;Positioning;Equipment evaluation, education, & procurement; Modalities; Patient/Caregiver education & training; Safety education & training;Self-Care / ADL; Home management training;Cognitive/Perceptual training;Coordination

## 2022-07-11 NOTE — PROGRESS NOTES
Physical Therapy  Facility/Department: [facility]  Rehabilitation Physical Therapy Treatment Note    NAME: Gayle Roberson  : 1960 (65 y.o.)  MRN: [id number]  CODE STATUS: Full Code    Date of Service: 22       Restrictions:  Restrictions/Precautions: Fall Risk;Swallowing - Thickened Liquids; General Precautions  Position Activity Restriction  Other position/activity restrictions: R sided weakness; up with assist; Nectar thick liquids     SUBJECTIVE  Subjective  Subjective: Pt sitting up in chair upon approach. Agreeable to PT tx  Pain: Pt denies pain this date      OBJECTIVE  Cognition  Overall Cognitive Status: Exceptions  Arousal/Alertness: Appropriate responses to stimuli  Following Commands: Follows one step commands with repetition; Follows one step commands consistently  Attention Span: Attends with cues to redirect  Memory: Appears intact  Safety Judgement: Decreased awareness of need for assistance;Decreased awareness of need for safety  Problem Solving: Assistance required to generate solutions;Assistance required to identify errors made  Insights: Decreased awareness of deficits  Initiation: Requires cues for some  Sequencing: Requires cues for some  Cognition Comment: impulsive  Orientation  Overall Orientation Status: Within Functional Limits  Orientation Level: Oriented to time;Oriented to situation;Oriented to person;Oriented to place    Functional Mobility  Balance  Sitting Balance: Supervision  Standing Balance: Contact guard assistance  Standing Balance  Activity: See therex  Transfers  Additional Factors: Hand placement cues; Increased time to complete;Verbal cues  Device: Walker (RW w/ R clamshell)  Sit to Stand  Assistance Level: Contact guard assist;Minimal assistance  Skilled Clinical Factors: cues for hand placement and RLE placement and safety of overall technique  Stand to Sit  Assistance Level: Minimal assistance;Contact guard assist  Skilled Clinical Factors: cues for hand placement and sequencing; poor eccentric control. pt req Omero from w/ t/f from softer surfaces (chair, bed), but CGA for firmer surfaces. Environmental Mobility  Ambulation  Surface: Level surface  Device: Rolling walker  Distance: 150 ft x2  Activity: Within Unit  Assistance Level: Contact guard assist  Gait Deviations: Decreased step length right;Decreased heel strike right; Unsteady gait; Path deviations; Slow hortencia;Decreased weight shift bilateral  Skilled Clinical Factors: Pt req verbal cues to facilitate R foot clearance  Stairs  Stair Height: 6''  Device: Bilateral handrails  Number of Stairs: 4  Additional Factors: Verbal cues; Hand placement cues; Non-reciprocal going up;Non-reciprocal going down; Increased time to complete  Assistance Level: Contact guard assist  Skilled Clinical Factors: cues for safety and sequencing stairs  Skilled Clinical Factors - Comments: increased time and effort to bring RLE onto step             PT Exercises  Exercise Treatment: Pt performed:     - pt performed 150 ft of amb w/ RW and clamshell and CGA-SBA and no LOB   - Pt performed Standing soccer ball taps (standing at RW w/ clamshell, performing wt shifts and controlled taps w/ bilat feet to soccer ball). For 2x10 bilat. - Pt performed sit to stands w/o UE support for 2x10 w/ CGA. - Pt performed 4 stairs w/ UHR and verbal cues for proper LE sequencing. Pt req CGA for stair navigation.   - Pt then performed 150 ft of amb w/ RW and clamshell and CGA-SBA w/ no LOB. ASSESSMENT/PROGRESS TOWARDS GOALS  Vital Signs  Heart Rate: 85  BP: 115/70  BP Location: Right upper arm  MAP (Calculated): 85  SpO2: 98 %  O2 Device: None (Room air)    Assessment  Assessment: Pt progressing well towards goals this date. Pt was able to perform amb w/ dec assistance/only verbal cues this date to improve R foot clearance. Pt was able to perform balance exercises w/ inc difficulty and no LOB this date.  Pt will cont to benefit from skilled PT services to improve function towards goals. Pt will cont to benefit from 24 hr sup and continued therapy after d/c. Activity Tolerance: Patient tolerated treatment well  Discharge Recommendations: 24 hour supervision or assist;Patient would benefit from continued therapy after discharge  PT Equipment Recommendations  Equipment Needed: No    Goals  Patient Goals   Patient goals : \"to be able to walk again and use my R arm\"  Short Term Goals  Time Frame for Short term goals: 10 days (7/9/22)  Short term goal 1: Pt will perform bed mobility with min A  -7/08: goal met: SBA  Short term goal 2: Pt will perform transfers with min A  -7/08: goal met: CGA-Omero  Short term goal 3: Pt will ambulate 25' with quad cane and mod A  -7/05 Goal met  Short term goal 4: Pt will negotiate 4 steps with HR and mod A  -7/05 Goal met  Long Term Goals  Time Frame for Long term goals : 21 days (7/20/22)  Long term goal 1: Pt will perform bed mobility mod I  Long term goal 2: Pt will perform transfers with supervision  Long term goal 3: Pt will ambulate 48' with quad cane and CGA 7/05 update goal: Pt will ambulate 150' with RW and clamshell with supervision  Long term goal 4: Pt will propel wheelchair 150' mod I; 7/05 discontinue goal  Long term goal 5: Pt will negotiate 4 steps with HR and min A    PLAN OF CARE/SAFETY  Plan  Plan:  minutes of therapy at least 5 out of 7 days a week  Plan weeks: 21 days  Specific Instructions for Next Treatment: R katlyn strength training and continue mobility progression  Current Treatment Recommendations: Strengthening;ROM;Balance training;Functional mobility training;Transfer training; Endurance training; Wheelchair mobility training;Gait training;Stair training;Neuromuscular re-education;Manual Therapy - Soft Tissue Mobilization;Patient/Caregiver education & training; Safety education & training;Home exercise program;Equipment evaluation, education, & procurement; Modalities; Therapeutic activities;ADL/Self-care training;IADL training  Safety Devices  Type of Devices: All fall risk precautions in place;Call light within reach; Chair alarm in place;Gait belt;Nurse notified; Left in chair;Patient at risk for falls    EDUCATION  Education  Education Given To: Patient  Education Provided: Role of Therapy; Safety;Equipment; Mobility Training; Fall Prevention Strategies;Transfer Training;Plan of Care;Precautions; Home Exercise Program  Education Provided Comments: Pt educated on safety w/ sit to stand and t/fs  Education Method: Demonstration;Verbal  Barriers to Learning: Cognition  Education Outcome: Verbalized understanding;Continued education needed        Therapy Time   Individual Concurrent Group Co-treatment   Time In 1230         Time Out 1300         Minutes 30           Timed Code Treatment Minutes: [address], [place], 07/11/22 at 3:27 PM

## 2022-07-11 NOTE — PROGRESS NOTES
Speech Language Pathology  [facility]: ACUTE REHAB UNIT  SPEECH-LANGUAGE PATHOLOGY      [x] Daily  [] Weekly Care Conference Note  [] Discharge    German [place]      VHI:1[birth year]43/[birth year]  RXU:[id number]  Rehab Dx/Hx: Acute CVA (cerebrovascular accident) ([facility] [address] [I63.9]    Precautions: falls  Home situation: lives with brother, manages own finances, did not take any meds PTA, active   ST Dx: [] Aphasia  [x] Dysarthria  [] Apraxia   [] Oropharyngeal dysphagia [x] Cognitive Impairment  [] Other:   Date of Admit: 6/29/2022  Room #: 0166/0166-01    Current functional status (updated daily):         Pt being seen for : [x] Speech/Language Treatment  [] Dysphagia Treatment [x] Cognitive Treatment  [] Other:  Communication: []WFL  [] Aphasia  [x] Dysarthria  [] Apraxia  [] Pragmatic Impairment [] Non-verbal  [] Hearing Loss  [] Other:   Cognition: [] WFL  [x] Mild  [] Moderate  [] Severe [] Unable to Assess  [] Other:  Memory: [] WFL  [x] Mild  [] Moderate  [] Severe [] Unable to Assess  [] Other:  Behavior: [x] Alert  [x] Cooperative  [x]  Pleasant  [] Confused  [] Agitated  [] Uncooperative  [] Distractible [] Motivated  [] Self-Limiting [] Anxious  [] Other:  Endurance:  [x] Adequate for participation in SLP sessions  [] Reduced overall  [] Lethargic  [] Other:  Safety: [x] No concerns at this time  [] Reduced insight into deficits  []  Reduced safety awareness [] Not following call light procedures  [] Unable to Assess  [] Other:    Current Diet Order:ADULT ORAL NUTRITION SUPPLEMENT; Lunch, Dinner; Frozen Oral Supplement  ADULT ORAL NUTRITION SUPPLEMENT; Breakfast, Dinner; Standard 4 oz Oral Supplement  ADULT DIET;  Regular; Mildly Thick (Nectar)  Swallowing Precautions: upright for all intake, stay upright for at least 30 mins after intake, small bites/sips, set-up assistance / distant supervision with intake, alternate bites/sips, slow rate of intake, general aspiration precautions and hold PO and contact SLP if s/s of aspiration or worsening respiratory status develop.           Date: 7/11/2022      Tx session 1  2599-1454 Tx session 2  All tx needs met in session 1    Total Timed Code Min 15    Total Treatment Minutes 60    Individual Treatment Minutes 60    Group Treatment Minutes 0    Co-Treat Minutes 0    Variance/Reason:  0    Pain Denies    Pain Intervention [] RN notified  [] Repositioned  [] Intervention offered and patient declined  [x] N/A  [] Other: [] RN notified  [] Repositioned  [] Intervention offered and patient declined  [] N/A  [] Other:   Subjective     Pt alert and cooperative, agreeable to tx. Pt upright in bedside chair for session. Objective:  Goals     Dysphagia Goals:  Short-term Goals  Timeframe for Short-term Goals: 14 days (7/13/22)    Goal 1. The patient will tolerate recommended diet without observed clinical signs of aspiration. Oral care completed with pt; swish and spit concept with thin liquids, no overt s/s of aspiration. FFWP:  -TL via cup  -x15 cup sips  -x2 instances of delayed throat clear and wet vocal quality    Pt reported no difficulties with recent diet upgraded to regular solids. Goal 2. The patient/caregiver will demonstrate understanding of compensatory strategies for improved swallowing safety. SLP provided edu re: importance of exercises, strict aspiration precautions, FFWP guidelines, current diet recs. Goal 3. The patient will tolerate instrumental swallowing procedure Plan to complete FEES tomorrow with patient. Pt last had a MBSS completed on 06/28 at [facility]. Pt in agreement to complete FEES, will have RN place order if MD is in agreement. Goal 4: The pt will complete laryngeal/pharyngeal strengthening exercises in 10/10 trials, min cues.     Exercises completed with vital stim/NMES in place:  -placement 3a at 8.0mA for 30 mins mins  -pt tolerated without any adverse reactions or side effects    -effortful swallow: x20  -isometric tongue tip hold for 5 secs: x20  -jaw jut hold for 5 secs: x20  -resistive jaw opening hold for 5 secs: x20  -falsetto \"ee\": x20      *New goal added 07/01/22*  Goal 5. The pt will complete OME's for improved oral motor function, coordination, strength, and ROM in 10/10 trials, min cues. Did not target. Speech/Lang/Cog goals:  Short-term Goals  Timeframe for Short-term Goals: 14 days (7/13/22)    Goal 1: The pt will complete graded recall tasks with 90% accuracy, min-no cues. Memory & mental manipulation/ranking task:  -pt given a set of three words and asked to repeat the words given a specific direction  -ex: jaime, plane, fly (rank from smallest to largest)  -100% acc indep     Goal 2: The pt will complete executive function tasks (i.e. planning, deductive reasoning, inferential, functional organization tasks) with 90% accuracy no cues. Did not target. Goal 3: The pt will complete high-level problem-solving tasks (*particularly related to safety scenarios) with 95% accuracy, no cues. Did not target. Goal 4: The pt will complete articulatory agility tasks with 70% accuracy, mod cues. Pt reading 10+ word sentences:  -70% acc indep improving to 100% acc given mod cues for use of slow rate and over articulation  -pt with indep use of natural pause breaks during sentences      Goal 5: The pt will effectively use compensatory speech strategies during structured speech tasks with 70% accuracy, mod cues. SLP edu pt re: SLOB speech strategies - slow, loud, over-exaggerate, breath support. Pt benefited from mod cues in order to use during conversational speech.       Other areas targeted: N/a    Education:   edu provided re: rationale for repeat swallow assessment, speech strategies for improved intelligibility       Safety Devices: [x] Call light within reach  [x] Chair alarm activated  [] Bed alarm activated  [] Other: [] Call light within reach  [] Chair alarm activated  [] Bed alarm activated  [] Other: Assessment: Pt pleasant and cooperative, agreeable to participate. Oral care completed with pt, and FFWP implemented. Pt presented with delayed throat clear and wet vocal quality on 2/15 trials. Pt last had a MBSS at [facility] on 06/28 with suspected silent aspiration of thin liquids. Pt would benefit from repeat instrumental (planning to complete FEES tomorrow if MD is in agreement). Pt tolerated vital stim/NMES placement 3a at 8.0mA for 30 mins without any adverse reactions or side effects, with TL PO trials and completion of pharyngeal/laryngeal strengthening exercises. Pt completed functional recall task with 100% acc indep. Pt completed reading aloud 10+ word sentences with 70% intelligibility, improving to 100% acc given mod cues for use of slow rate and over articulation. Pt remains strongly motivated to improve and is making good progress towards all goals. Continue goals above. Plan: Continue as per plan of care. Additional Information:     Barriers toward progress: None  Discharge recommendations:  [] Home independently  [x] Home with assistance []  24 hour supervision  [] ECF [] Other:  Continued Tx Upon Discharge: ? [] Yes [] No [x] TBD based on progress while on ARU [] Vital Stim indicated [] Other:   Estimated discharge date: 07/22/22    Interventions used this date:  [x] Speech/Language Treatment  [] Instruction in HEP [] Group [x] Dysphagia Treatment [x] Cognitive Treatment   [] Other: Total Time Breakdown / Charges    Time in Time out Total Time / units   Cognitive Tx 0950 1000 10 min/ 1 unit    Speech Tx 0940 0950 10 min/ 1 unit    Dysphagia Tx 0900 0940 40 min/ 1 unit        Electronically Signed by     Akash Sanchez. A CCC-SLP  Speech-Language Pathologist  [id number]

## 2022-07-11 NOTE — PROGRESS NOTES
Miladys Morejon  7/11/2022  [id number]    Chief Complaint: Acute CVA (cerebrovascular accident) [facility])    Subjective:   Patient seen this AM.  Patient did well over the weekend, with no problems noted. He thinks he is making good progress in his therapies with his muscle strength and coordination. Repeat labs this morning are still pending. He denies other acute complaints at this time. ROS: denies f/c, n/v, cp, sob    Objective:  Patient Vitals for the past 24 hrs:   BP Temp Temp src Pulse Resp SpO2   07/11/22 0830 120/77 97.8 °F (36.6 °C) Oral 88 18 97 %   07/10/22 1945 128/86 97.9 °F (36.6 °C) Oral 91 18 96 %     Gen: No distress, pleasant. HEENT: Normocephalic, atraumatic. CV: extremities well perfused  Resp: No respiratory distress. Abd: Soft, nontender   Ext: No edema. Neuro: Alert, oriented, appropriately interactive. Sits on edge of bed without LOB    Wt Readings from Last 3 Encounters:   06/29/22 145 lb 15.1 oz (66.2 kg)   02/14/22 145 lb (65.8 kg)       Laboratory data:   Lab Results   Component Value Date    WBC 5.0 07/07/2022    HGB 15.5 07/07/2022    HCT 44.2 07/07/2022    .9 (H) 07/07/2022     07/07/2022       Lab Results   Component Value Date/Time     07/07/2022 08:19 AM    K 4.1 07/07/2022 08:19 AM     07/07/2022 08:19 AM    CO2 26 07/07/2022 08:19 AM    BUN 13 07/07/2022 08:19 AM    CREATININE 0.7 07/07/2022 08:19 AM    GLUCOSE 120 07/07/2022 08:19 AM    CALCIUM 10.3 07/07/2022 08:19 AM        Therapy progress:  PT  Position Activity Restriction  Other position/activity restrictions: R sided weakness; up with assist; Nectar thick liquids  Objective     Sit to Stand:  Moderate Assistance  Stand to sit: Moderate Assistance  Bed to Chair: Moderate assistance (to L with cues for safety)  Device: Parallel Bars  Assistance: Minimal assistance  Distance: 8' and 20'  OT  PT Equipment Recommendations  Equipment Needed: No  Toilet - Technique: Ambulating  Equipment

## 2022-07-11 NOTE — PROGRESS NOTES
Physical Therapy  Facility/Department: [facility]  Rehabilitation Physical Therapy Treatment Note    NAME: Zeus Johnson  : 1960 (66 y.o.)  MRN: [id number]  CODE STATUS: Full Code    Date of Service: 22       Restrictions:  Restrictions/Precautions: Fall Risk;Swallowing - Thickened Liquids; General Precautions  Position Activity Restriction  Other position/activity restrictions: R sided weakness; up with assist; Nectar thick liquids     SUBJECTIVE  Subjective  Subjective: Pt sitting up in chair upon approach. Agreeable to PT tx  Pain: pt reports 4/10 pain in knee with ambulation, decreased pain after ace wrap applied to knee        Post Treatment Pain Screening         OBJECTIVE  Cognition  Overall Cognitive Status: Exceptions  Arousal/Alertness: Appropriate responses to stimuli  Following Commands: Follows one step commands with repetition; Follows one step commands consistently  Attention Span: Attends with cues to redirect  Memory: Appears intact  Safety Judgement: Decreased awareness of need for assistance;Decreased awareness of need for safety  Problem Solving: Assistance required to generate solutions;Assistance required to identify errors made  Insights: Decreased awareness of deficits  Initiation: Requires cues for some  Sequencing: Requires cues for some  Cognition Comment: impulsive, cues to attend to R side of body  Orientation  Overall Orientation Status: Within Functional Limits  Orientation Level: Oriented to time;Oriented to situation;Oriented to person;Oriented to place    Functional Mobility  Transfers  Surface: From chair with arms; Wheelchair  Additional Factors: Hand placement cues; Increased time to complete;Verbal cues  Device: Walker (RW with R clamshell)  Sit to Stand  Assistance Level: Contact guard assist;Minimal assistance  Skilled Clinical Factors: cues for hand placement and RLE placement and safety of overall technique  Stand to Sit  Assistance Level: Minimal assistance;Contact guard assist  Skilled Clinical Factors: cues for hand placement and sequencing; poor eccentric control. pt req Omero from w/ t/f from softer surfaces (chair, bed), but CGA for firmer surfaces. Bed To/From Chair  Technique: Stand pivot  Assistance Level: Contact guard assist      Environmental Mobility  Ambulation  Surface: Level surface  Device: Rolling walker  Distance: 150 ft x2  Activity: Within Unit  Assistance Level: Contact guard assist  Gait Deviations: Decreased step length right;Decreased heel strike right; Unsteady gait; Path deviations; Slow hortencia;Decreased weight shift bilateral  Skilled Clinical Factors: Pt req verbal cues to facilitate R foot clearance             PT Exercises  Dynamic Standing Balance Exercises: dynamic reaching activity in standing with primarily CGA, 2 episodes of min A to correct lob, stepping on compliant surface, unilateral support RW      ASSESSMENT/PROGRESS TOWARDS GOALS       Assessment  Assessment: pt motivated to participate, pt tolerated gait training with cues for R foot clearance in swing phase, cues to keep maribel inside RW, pt required 2 skilled therapists for higher level balance activities utilizing compliant surface with reaching activities, pt required min A to correct severaly losses of balance primarily to R side, pt remains impulsive with mobility without regard for safety at times, pt will benefit from continued IPR Skilled PT to address functional mobility deficits  Activity Tolerance: Patient tolerated treatment well  Discharge Recommendations: 24 hour supervision or assist;Patient would benefit from continued therapy after discharge  PT Equipment Recommendations  Equipment Needed: No    Goals  Patient Goals   Patient goals : \"to be able to walk again and use my R arm\"  Short Term Goals  Time Frame for Short term goals: 10 days (7/9/22)  Short term goal 1: Pt will perform bed mobility with min A  -7/08: goal met: SBA  Short term goal 2: Pt will perform transfers with min A  -7/08: goal met: CGA-Omero  Short term goal 3: Pt will ambulate 25' with quad cane and mod A  -7/05 Goal met  Short term goal 4: Pt will negotiate 4 steps with HR and mod A  -7/05 Goal met  Long Term Goals  Time Frame for Long term goals : 21 days (7/20/22)  Long term goal 1: Pt will perform bed mobility mod I  Long term goal 2: Pt will perform transfers with supervision  Long term goal 3: Pt will ambulate 48' with quad cane and CGA 7/05 update goal: Pt will ambulate 150' with RW and clamshell with supervision  Long term goal 4: Pt will propel wheelchair 150' mod I; 7/05 discontinue goal  Long term goal 5: Pt will negotiate 4 steps with HR and min A    PLAN OF CARE/SAFETY  Plan  Plan:  minutes of therapy at least 5 out of 7 days a week  Plan weeks: 21 days  Specific Instructions for Next Treatment: R katlyn strength training and continue mobility progression  Current Treatment Recommendations: Strengthening;ROM;Balance training;Functional mobility training;Transfer training; Endurance training; Wheelchair mobility training;Gait training;Stair training;Neuromuscular re-education;Manual Therapy - Soft Tissue Mobilization;Patient/Caregiver education & training; Safety education & training;Home exercise program;Equipment evaluation, education, & procurement; Modalities; Therapeutic activities;ADL/Self-care training;IADL training  Safety Devices  Type of Devices: All fall risk precautions in place;Call light within reach; Chair alarm in place;Gait belt;Nurse notified; Left in chair;Patient at risk for falls    EDUCATION  Education  Education Given To: Patient  Education Provided: Role of Therapy; Safety;Equipment; Mobility Training; Fall Prevention Strategies;Transfer Training;Plan of Care;Precautions; Home Exercise Program  Education Provided Comments: Pt educated on safety w/ sit to stand and t/fs  Education Method: Demonstration;Verbal  Barriers to Learning: Cognition  Education Outcome: Verbalized understanding;Continued education needed        Therapy Time   Individual Concurrent Group Co-treatment   Time In 1230     1100   Time Out 1300     1130   Minutes 30     30     Timed Code Treatment Minutes: 30 Minutes       Cami Carvajal, PT, 07/11/22 at 3:43 PM

## 2022-07-12 PROCEDURE — 97116 GAIT TRAINING THERAPY: CPT

## 2022-07-12 PROCEDURE — 97110 THERAPEUTIC EXERCISES: CPT

## 2022-07-12 PROCEDURE — 97112 NEUROMUSCULAR REEDUCATION: CPT

## 2022-07-12 PROCEDURE — 92526 ORAL FUNCTION THERAPY: CPT

## 2022-07-12 PROCEDURE — 6360000002 HC RX W HCPCS: Performed by: STUDENT IN AN ORGANIZED HEALTH CARE EDUCATION/TRAINING PROGRAM

## 2022-07-12 PROCEDURE — 97032 APPL MODALITY 1+ESTIM EA 15: CPT

## 2022-07-12 PROCEDURE — 97530 THERAPEUTIC ACTIVITIES: CPT

## 2022-07-12 PROCEDURE — 6370000000 HC RX 637 (ALT 250 FOR IP): Performed by: STUDENT IN AN ORGANIZED HEALTH CARE EDUCATION/TRAINING PROGRAM

## 2022-07-12 PROCEDURE — 92612 ENDOSCOPY SWALLOW (FEES) VID: CPT

## 2022-07-12 PROCEDURE — 1280000000 HC REHAB R&B

## 2022-07-12 RX ADMIN — TRAZODONE HYDROCHLORIDE 50 MG: 50 TABLET ORAL at 21:49

## 2022-07-12 RX ADMIN — ENOXAPARIN SODIUM 40 MG: 100 INJECTION SUBCUTANEOUS at 07:32

## 2022-07-12 RX ADMIN — ATORVASTATIN CALCIUM 10 MG: 10 TABLET, FILM COATED ORAL at 21:49

## 2022-07-12 RX ADMIN — ASPIRIN 81 MG: 81 TABLET, CHEWABLE ORAL at 07:32

## 2022-07-12 RX ADMIN — POLYVINYL ALCOHOL 2 DROP: 14 SOLUTION/ DROPS OPHTHALMIC at 07:33

## 2022-07-12 RX ADMIN — ACETAMINOPHEN 650 MG: 325 TABLET ORAL at 21:49

## 2022-07-12 RX ADMIN — POLYVINYL ALCOHOL 2 DROP: 14 SOLUTION/ DROPS OPHTHALMIC at 21:49

## 2022-07-12 ASSESSMENT — PAIN DESCRIPTION - LOCATION: LOCATION: SHOULDER

## 2022-07-12 ASSESSMENT — PAIN DESCRIPTION - PAIN TYPE: TYPE: ACUTE PAIN

## 2022-07-12 ASSESSMENT — PAIN DESCRIPTION - DESCRIPTORS: DESCRIPTORS: ACHING

## 2022-07-12 ASSESSMENT — PAIN DESCRIPTION - ONSET: ONSET: GRADUAL

## 2022-07-12 ASSESSMENT — PAIN DESCRIPTION - FREQUENCY: FREQUENCY: INTERMITTENT

## 2022-07-12 ASSESSMENT — PAIN SCALES - GENERAL
PAINLEVEL_OUTOF10: 3
PAINLEVEL_OUTOF10: 2
PAINLEVEL_OUTOF10: 0

## 2022-07-12 ASSESSMENT — PAIN DESCRIPTION - ORIENTATION: ORIENTATION: RIGHT

## 2022-07-12 ASSESSMENT — PAIN - FUNCTIONAL ASSESSMENT: PAIN_FUNCTIONAL_ASSESSMENT: ACTIVITIES ARE NOT PREVENTED

## 2022-07-12 NOTE — PROGRESS NOTES
Comprehensive Nutrition Assessment    Type and Reason for Visit:  Reassess    Nutrition Recommendations/Plan:   1. Continue regular diet with mildly thick liquids  2. Diet texture/liquid level per SLP recommendations  3. Encourage po intakes  4. Continue [employer]  5. Monitor po intakes, nutrition adequacy, weights, pertinent labs, BMs     Malnutrition Assessment:  Malnutrition Status: Moderate malnutrition (06/30/22 1649)    Context:  Acute Illness     Findings of the 6 clinical characteristics of malnutrition:  Energy Intake:  No significant decrease in energy intake  Weight Loss:  Unable to assess     Body Fat Loss:  Mild body fat loss Orbital   Muscle Mass Loss:  Mild muscle mass loss Temples (temporalis),Clavicles (pectoralis & deltoids)    Nutrition Assessment:    Follow up: Pt stable from a nutrition standpoint AEB pt report of okay appetite. Pt currently on a regular diet with mildly thick liquids. PO intakes % per EMR. Pt reports that he has been consuming % of his ONS as well. Encouraged po intakes. Will monitor. Nutrition Related Findings:    Labs reviewed. Last BM on 7/8. Wound Type: None       Current Nutrition Intake & Therapies:    Average Meal Intake: 51-75%,%  Average Supplements Intake: 51-75%,%  ADULT ORAL NUTRITION SUPPLEMENT; Lunch, Dinner; Frozen Oral Supplement  ADULT ORAL NUTRITION SUPPLEMENT; Breakfast, Dinner; Standard 4 oz Oral Supplement  ADULT DIET; Regular; Mildly Thick (Nectar)    Anthropometric Measures:  Height: 6' 2\" (188 cm)  Ideal Body Weight (IBW): 190 lbs (86 kg)       Current Body Weight: 145 lb 15.1 oz (66.2 kg), 76.3 % IBW. Weight Source: Bed Scale  Current BMI (kg/m2): 18.7  Usual Body Weight: 165 lb (74.8 kg) (per pt)  % Weight Change (Calculated): -12.1                    BMI Categories: Normal Weight (BMI 18.5-24. 9)    Estimated Daily Nutrient Needs:  Energy Requirements Based On: Kcal/kg (25-30)  Weight Used for Energy Requirements: Current  Energy (kcal/day): 7623-9231  Weight Used for Protein Requirements: Current (1.2-1.4)  Protein (g/day): 79-92  Method Used for Fluid Requirements: 1 ml/kcal  Fluid (ml/day): 0841-7462    Nutrition Diagnosis:   · Inadequate oral intake related to inadequate protein-energy intake as evidenced by poor intake prior to Bahman Controls loss,Criteria as identified in malnutrition assessment    Nutrition Interventions:   Food and/or Nutrient Delivery: Continue Current Diet,Continue Oral Nutrition Supplement  Nutrition Education/Counseling: No recommendation at this time  Coordination of Nutrition Care: Continue to monitor while inpatient  Plan of Care discussed with: Patient    Goals:  Previous Goal Met: Progressing toward Goal(s)  Goals: PO intake 50% or greater,prior to discharge       Nutrition Monitoring and Evaluation:   Behavioral-Environmental Outcomes: None Identified  Food/Nutrient Intake Outcomes: Food and Nutrient Intake,Supplement Intake  Physical Signs/Symptoms Outcomes: Biochemical Data,Nutrition Focused Physical Findings,Weight    Discharge Planning:    Continue current diet,Continue Oral Nutrition Supplement     Rodger Sol RD, LD  Contact: [phone]

## 2022-07-12 NOTE — PROGRESS NOTES
Mayra Serum  7/12/2022  [id number]    Chief Complaint: Acute CVA (cerebrovascular accident) [facility])    Subjective:   Patient seen this AM.  Patient did well in his therapies yesterday, and he notes improvement in his balance, and states his right hand coordination is getting better also. Repeat labs yesterday look good and are stable. Patient will be discussed in rehab conference tomorrow with the entire rehab team.  Speech will do a swallowing study on this patient today. ROS: denies f/c, n/v, cp, sob    Objective:  Patient Vitals for the past 24 hrs:   BP Temp Temp src Pulse Resp SpO2   07/12/22 0730 98/64 97.9 °F (36.6 °C) Oral 73 18 94 %   07/11/22 2045 122/81 98.2 °F (36.8 °C) Oral 88 18 98 %   07/11/22 1521 115/70 -- -- 85 -- 98 %     Gen: No distress, pleasant. HEENT: Normocephalic, atraumatic. CV: extremities well perfused  Resp: No respiratory distress. Abd: Soft, nontender   Ext: No edema. Neuro: Alert, oriented, appropriately interactive. Sits on edge of bed without LOB    Wt Readings from Last 3 Encounters:   06/29/22 145 lb 15.1 oz (66.2 kg)   02/14/22 145 lb (65.8 kg)       Laboratory data:   Lab Results   Component Value Date    WBC 5.4 07/11/2022    HGB 15.1 07/11/2022    HCT 43.1 07/11/2022    .9 (H) 07/11/2022     07/11/2022       Lab Results   Component Value Date/Time     07/11/2022 09:16 AM    K 4.2 07/11/2022 09:16 AM     07/11/2022 09:16 AM    CO2 26 07/11/2022 09:16 AM    BUN 18 07/11/2022 09:16 AM    CREATININE 0.7 07/11/2022 09:16 AM    GLUCOSE 197 07/11/2022 09:16 AM    CALCIUM 10.4 07/11/2022 09:16 AM        Therapy progress:  PT  Position Activity Restriction  Other position/activity restrictions: R sided weakness; up with assist; Nectar thick liquids  Objective     Sit to Stand:  Moderate Assistance  Stand to sit: Moderate Assistance  Bed to Chair: Moderate assistance (to L with cues for safety)  Device: Parallel Bars  Assistance: Minimal assistance  Distance: 8' and 20'  OT  PT Equipment Recommendations  Equipment Needed: No  Toilet - Technique: Ambulating  Equipment Used: Standard toilet (with L grab bar)  Toilet Transfers Comments: cues for safe hand placement; decreased proprioception when going to initate sit down to toilet   Assessment        SLP  Current Diet : Soft and Bite-Sized             Body mass index is 18.74 kg/m². Assessment and Plan:  Chris Peñaloza is a 60year old male with a past medical history significant for left sided stroke due to ICA dissection (2018) without residual deficits, HLD, and nicotine dependence who presented to  on 6/26/22 with acute onset of right face, arm, and leg weakness, found to have acute CVA. He was admitted to [facility] on 6/29/22 due to functional deficits below his baseline.      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 Follow bowel movements. Enema or suppository if needed.      Bladder: Check PVR x 3. [address] if PVR > 200ml or if any volume is > 500 ml.      Sleep: Trazodone provided prn.      Follow up appointments: PCP, Stroke clinic    Services: home health versus outpatient [facility]  EDOD: 7/22/22    Ángela Simmons.  MD Gina 7/12/2022, 8:40 AM

## 2022-07-12 NOTE — PROGRESS NOTES
Occupational Therapy  Facility/Department: [facility]  Rehabilitation Occupational Therapy Daily Treatment Note    Date: 22  Patient Name: Buell Lanes       Room: [facility]84[facility]  MRN: [id number]  Account: [de-identified]   : 1960  (63 y.o.) Gender: male                    Past Medical History:  has a past medical history of CVA (cerebral vascular accident) ([address] Past Surgical History:   has no past surgical history on file. Restrictions  Restrictions/Precautions: Fall Risk;Swallowing - Thickened Liquids; General Precautions  Other position/activity restrictions: R sided weakness; up with assist; Nectar thick liquids    Subjective  Subjective: Pt in recliner, requesting eye drops, no pain, R knee pain with walking but did not rate, BP 92/68, HR 94, O2 sats 94% on RA. Restrictions/Precautions: Fall Risk;Swallowing - Thickened Liquids; General Precautions             Objective     Cognition  Overall Cognitive Status: Exceptions  Arousal/Alertness: Appropriate responses to stimuli  Following Commands: Follows one step commands with repetition; Follows one step commands consistently  Attention Span: Attends with cues to redirect  Memory: Appears intact  Safety Judgement: Decreased awareness of need for assistance;Decreased awareness of need for safety  Problem Solving: Assistance required to generate solutions;Assistance required to identify errors made  Insights: Decreased awareness of deficits  Initiation: Requires cues for some  Sequencing: Requires cues for some  Orientation  Overall Orientation Status: Within Functional Limits   Perception  Overall Perceptual Status: Impaired  Unilateral Attention: Cues to attend to right side of body;Cues to maintain midline in sitting;Cues to maintain midline in standing  Initiation: Cues to initiate tasks  Motor Planning: Cues to use objects appropriately  Perseveration: Perseverates during conversation           Functional Mobility  Device: Rolling walker  Activity: To/From therapy gym  Assistance Level: Contact guard assist  Skilled Clinical Factors: CGA/min A without AD for majority of session  Transfers  Surface: To chair with arms;From chair with arms  Additional Factors: Set-up; Verbal cues; Increased time to complete;Hand placement cues  Device: Walker  Sit to Stand  Assistance Level: Contact guard assist  Stand to Sit  Assistance Level: Contact guard assist  Bed To/From Chair  Technique: Stand step  Assistance Level: Contact guard assist   OT Exercises  Dynamic Sitting Balance Exercises: x20 with 2# medicine ball for obliques, core rotation, and chest press     Assessment  Assessment  Assessment: Pt agreeable to OT session. Pt completed mobility with RW to gym with CGA. Pt performed core exercises with fair strength for BUEs but difficulty grasping ball, straps on medicine ball placed around wrist. Pt completed grasp and release tasks to  items and transfer across room with use of Bioness and no AD for ambulating. Pt completed walking for 4:15, 4:30, and 6:50 with CGA/min A and min VCs for clearance of R foot and safety with turns. Pt demonstrated improved safety with turning this date without AD. Pt required assist to place cylinders in R hand but good ability to grasp and release with assist of Bioness but able to grasp and release golf ball without assist to place in hand and good shoulder flexion to reach to shoulder height to place balls in basket. Continue POC. Activity Tolerance: Patient tolerated treatment well  Discharge Recommendations: 24 hour supervision or assist;Outpatient OT  OT Equipment Recommendations  Equipment Needed: Yes  Mobility Devices: ADL Assistive Devices  ADL Assistive Devices: Toileting - 3-in-1 Commode;Grab Bars - shower; Shower Chair with back; Reacher;Sock-Aid Hard;Elastic Shoe Laces  Other: Pt will benefit from a BSC to be used in the close confinements of the bedroom overnight to increase independence and safety during toileting will perform 2-3 grooming tasks with Min A for during tasks standing vs sitting with LRAD and Min A for balance. GOAL MET 7/11/22 Pt completed grooming tasks with min A. Long Term Goals  Time Frame for Long term goals : within 21 days by 7/20/22  Long Term Goal 1: Pt will perform TB dressing Mod I  Long Term Goal 2: Pt will perfrom TB bathing Mod I and AE PRN  Long Term Goal 3: Pt will perform simple IADL task in stance with LRAD for >7 minutes with supervision  Long Term Goal 4: Pt will self feed with R hand with Min A  Long Term Goal 5: Pt will increase Block and Box score on R hand by >4 blocks- goal progressing as of assessment 7/7 R hand = 1 block in 60 seconds.       Therapy Time   Individual Concurrent Group Co-treatment   Time In 0830         Time Out 0930         Minutes 60         Timed Code Treatment Minutes: 900 Kari Montes

## 2022-07-12 NOTE — PROGRESS NOTES
Physical Therapy  Facility/Department: [facility]  Rehabilitation Physical Therapy Treatment Note    NAME: Estiven Barahona  : 1960 (26 y.o.)  MRN: [id number]  CODE STATUS: Full Code    Date of Service: 22       Restrictions:  Restrictions/Precautions: Fall Risk;Swallowing - Thickened Liquids; General Precautions  Position Activity Restriction  Other position/activity restrictions: R sided weakness; up with assist; Nectar thick liquids     SUBJECTIVE  Subjective  Subjective: Pt sitting up in chair upon approach. Agreeable to PT tx  Pain: none reported throughout session        Post Treatment Pain Screening         OBJECTIVE  Cognition  Overall Cognitive Status: Exceptions  Arousal/Alertness: Appropriate responses to stimuli  Following Commands: Follows one step commands with repetition; Follows one step commands consistently  Attention Span: Attends with cues to redirect  Memory: Appears intact  Safety Judgement: Decreased awareness of need for assistance;Decreased awareness of need for safety  Problem Solving: Assistance required to generate solutions;Assistance required to identify errors made  Insights: Decreased awareness of deficits  Initiation: Requires cues for some  Sequencing: Requires cues for some  Orientation  Overall Orientation Status: Within Functional Limits    Functional Mobility  Balance  Sitting Balance: Supervision  Standing Balance: Contact guard assistance  Transfers  Surface: From chair with arms; To chair with arms  Additional Factors: Verbal cues; Hand placement cues (verbal cues to turn all the way around before trying to sit)  Device:  (none this session)  Sit to Stand  Assistance Level: Contact guard assist  Skilled Clinical Factors: cues for hand placement and RLE placement and safety of overall technique  Stand to Sit  Assistance Level: Contact guard assist  Car Transfer  Assistance Level: Contact guard assist  Skilled Clinical Factors: pt requires cues for safe technique      Environmental Mobility  Ambulation  Surface: Level surface  Device:  (none)  Distance: 150 ft x2 ; + 100ft x 2 when stepping on compliant surface  Assistance Level: Contact guard assist;Minimal assistance (min A to correct balance losses)  Gait Deviations: Decreased step length right;Decreased heel strike right; Unsteady gait; Path deviations;Decreased weight shift bilateral  Skilled Clinical Factors: pt increased hortencia due to not using RW this session, required increased assist of Min A to avoid balance losses when turing to look at things or change direction, pt with multiple instances of making contact with obstacles on R side requiring min A to correct balance  Stairs  Stair Height: 6''  Device: One handrail  Number of Stairs: 12  Additional Factors: Verbal cues; Hand placement cues; Non-reciprocal going up;Non-reciprocal going down; Increased time to complete  Assistance Level: Contact guard assist;Minimal assistance  Skilled Clinical Factors: cues for safety and sequencing stairs  Skilled Clinical Factors - Comments: increased time and effort to bring RLE onto step             PT Exercises  Dynamic Standing Balance Exercises: stepping on compliant surfaces placed at intervals during gait training, side stepping with yellow tband around ankles while holding weighted ball in B UE requiring Min A to maintain balance      ASSESSMENT/PROGRESS TOWARDS GOALS       Assessment  Assessment: pt progressing toward meeting IPR goals, pt stated did not use RW with OT this morning and would like to continue without RW, pt with increased unsteadiness requiring increase level of assist for transfers and ambulation, multiple contacts with obstacles on R side, educated pt that RW is beneficial to slow hortencia with ambulation and provides support to prevent balance loss, this PT recommends continued use of RW for near future, pt is very unsteady without RW, pt will benefit from continued IPR Skilled PT to address functional mobility deficits  Activity Tolerance: Patient tolerated treatment well  Discharge Recommendations: 24 hour supervision or assist;Patient would benefit from continued therapy after discharge  PT Equipment Recommendations  Equipment Needed: No    Goals  Patient Goals   Patient goals : \"to be able to walk again and use my R arm\"  Short Term Goals  Time Frame for Short term goals: 10 days (7/9/22)  Short term goal 1: Pt will perform bed mobility with min A  -7/08: goal met: SBA  Short term goal 2: Pt will perform transfers with min A  -7/08: goal met: CGA-Omero  Short term goal 3: Pt will ambulate 25' with quad cane and mod A  -7/05 Goal met  Short term goal 4: Pt will negotiate 4 steps with HR and mod A  -7/05 Goal met  Long Term Goals  Time Frame for Long term goals : 21 days (7/20/22)  Long term goal 1: Pt will perform bed mobility mod I  Long term goal 2: Pt will perform transfers with supervision  Long term goal 3: Pt will ambulate 48' with quad cane and CGA 7/05 update goal: Pt will ambulate 150' with RW and clamshell with supervision  Long term goal 4: Pt will propel wheelchair 150' mod I; 7/05 discontinue goal  Long term goal 5: Pt will negotiate 4 steps with HR and min A    PLAN OF CARE/SAFETY  Plan  Plan:  minutes of therapy at least 5 out of 7 days a week  Plan weeks: 21 days  Specific Instructions for Next Treatment: R katlyn strength training and continue mobility progression  Current Treatment Recommendations: Strengthening;ROM;Balance training;Functional mobility training;Transfer training; Endurance training; Wheelchair mobility training;Gait training;Stair training;Neuromuscular re-education;Manual Therapy - Soft Tissue Mobilization;Patient/Caregiver education & training; Safety education & training;Home exercise program;Equipment evaluation, education, & procurement; Modalities; Therapeutic activities;ADL/Self-care training;IADL training  Safety Devices  Type of Devices:  All fall risk precautions in place;Call light within reach; Chair alarm in place;Gait belt;Nurse notified; Left in chair;Patient at risk for falls    EDUCATION  Education  Education Given To: Patient  Education Provided: Role of Therapy; Safety;Equipment; Mobility Training; Fall Prevention Strategies;Transfer Training;Plan of Care;Precautions; Home Exercise Program  Education Provided Comments: educated pt on fall prevention strategies, no carryover  Education Method: Demonstration;Verbal  Barriers to Learning: Cognition  Education Outcome: Continued education needed        Therapy Time   Individual Concurrent Group Co-treatment   Time In 1230         Time Out 1330         Minutes 60           Timed Code Treatment Minutes: PETE Nicholas, 07/12/22 at 4:15 PM

## 2022-07-12 NOTE — PROCEDURES
strengthening exercises with and without vital stim/NMES in place. Pt is also grossly tolerating thin liquids as part of the FFWP during sessions. There have been instances of delayed throat clearing and wet vocal quality, thus repeat instrumental with FEES is recommended to further assess pharyngeal phase of swallow and determine possible diet upgrade / least restrictive diet. FLEXIBLE ENDOSCOPIC EXAM: Scope passed successfully through right nare. No active bleeding. TESTING POSITION: Upright in bed    ORAL MOTOR FUNCTION: Reduced on R     BASE OF TONGUE RANGE OF MOTION: WFL    LATERAL PHARYNGEAL WALL RANGE OF MOTION: Mucosal colored prominence on right lateral pharyngeal wall. VELOPHARYNGEAL FUNCTION: WFL    TRUE VOCAL FOLDS: Mobile bilaterally     FALSE VOCAL FOLDS: WFL     ARYTENOID CARTILAGE: Right crosses midline at time during resting position     ARYEPIGLOTTIC FOLDS: WFL    INTERARYTENOID SPACE: Mild edema     BASELINE SECRETIONS: WFL     EPIGLOTTIC RIM RESTING POSITION: off base of tongue    VOCAL PITCH: WFL    VOCAL INTENSITY: Reduced at times due to rapid rate     VOCAL QUALITY: Reduced at times due to rapid rate     RESPIRATORY SUPPORT FOR PHONATION: WFL     SUSTAINED PHONATION: 8 seconds     COUGH ABILITY (VOLITIONAL / SPONTANEOUS): Reduced     SALIVA SWALLOW ABILITY (VOLITIONAL / SPONTANEOUS): Reduced     THROAT CLEAR ON CUE: Reduced     THROAT DISCOMFORT: None reported    SPEECH INTELLIGIBILITY: Moderate dysarthria characterized by rapid rate of speech and imprecise articulation, reducing intelligibility. PO TRIALS    THIN LIQUID (small control cup, small cup, large cup, straw): With thin cup sip, timely swallow. No penetration / aspiration. No residue. With large cup sip and straw sip, premature spillage to valleculae prior to initiation of swallow. No penetration or aspiration. No residue.  Of note, at end of study, pt given a controlled small cup sip of thin - pt moving head at same time. This resulted in incoordination of swallow and 1x coughing episode; suspected penetration (did not formally visualize). No aspiration. NECTAR THICK LIQUID (cup): Timely swallow. No penetration or aspiration. No residue. MIXED CONSISTENCY: Adequate mastication. Timely swallow. No penetration or aspiration of soft solid or liquid portion of mixed consistency. Minimal residue post-swallow in valleculae. REGULAR SOLIDS: Adequate mastication. Timely swallow. No penetration or aspiration. No residue. BACKFLOW OF SECRETIONS: No bolus color backflow noted. COMPENSATORY STRATEGIES / POSTURAL CHANGES TRIALED: Small bites/sips, slow rate, alternate liquids and solids, fully upright       PENETRATION-ASPIRATION SCALE (PAS)  [] 8 Material enters the airway, passes below the vocal folds, and no effort is made to eject  [] 7 Material enters the airway, passes below the vocal folds, and is not ejected from the trachea despite effort  [] 6 Material enters the airway, passes below the vocal folds, and is ejected into the larynx or out of the airway  [] 5 Material enters the airway, contacts the vocal folds, and is not ejected into the airway  [] 4 Material enters the airway, contacts the vocal folds, and is ejected from the airway  [] 3 Material enters the airway, remains above the vocal folds, and is not ejected from airway  [] 2 Material enters the airway, remains above the vocal folds, and is ejected from airway  [x] 1 Material does not enter the airway      IMPRESSION:  Moderate dysarthria characterized by rapid rate of speech and imprecise articulation, reducing intelligibility overall vocal quality and intensity. Mucosal colored prominence on right lateral pharyngeal wall. Mild edema within interarytenoid space. Right arytenoid cartilage crossed midline at times during resting position. Overall, pt with good vocal fold closure.  Timely swallow with thin cup sip, nectar thick liquids, mixed consistency, regular solids. With large cup sip and straw sip, premature spillage to valleculae prior to initiation of swallow. No penetration or aspiration. No residue. Of note, 1x instance at end of study where pt was given a controlled small cup sip of thin, pt moved head at the same time, resulting in incoordination of swallow and coughing - suspected penetration (did not formally visualize), but no aspiration. Dysphagia Treatment Goals  Short Term Goals:   Timeframe for Short Term Goals (14 days, 7/13/22)  Goal 1: The patient will tolerate recommended diet with no clinical s/s of aspiration 5/5  Goal 2: The patient/caregiver will demonstrate understanding of compensatory swallow strategies, for improved swallow safety  Goal 3: The patient will tolerate instrumental assessment when able   Goal 4: The patient will complete laryngopharyngeal strengthening exercises with 90% acc given min cues     Long Term Goals:   Timeframe for Long Term Goals (21 days, 7/20/22)  Goal 1: The patient will tolerate least restrictive diet with no clinical s/s of aspiration or worsening respiratory/pulmonary status      ENDOSCOPE REMOVAL: Endoscope was removed without incident, no adverse reactions. No bleeding. PRE TEST HR: 92  PRE TEST O2: 97%  POST TEST HR: 93  POST TEST O2: 96%    RECOMMENDATIONS: IDDSI 7 Regular, IDDSI 0 Thin Liquids, meds whole with puree (per pt preference)     RECOMMENDED COMPENSATORY STRATEGIES / POSTURAL CHANGES: Small bites/sips, slow rate, alternate liquids and solids, fully upright     EDUCATION:  Reviewed results and recommendations of this evaluation, signs, symptoms, and risk of aspiration, as well as diet recommendations and strategies. Reviewed and discussed goals and plan of care with pt and RN    TREATMENT TIME: 1330 - 1430; FEES procedure, dysphagia     ELECTRONIC SIGNATURE:  Rubio Dos Santos MA CCC-SLP #[id number]  Speech Language Pathologist    Also present for this study:  Kristian WILSON

## 2022-07-12 NOTE — PATIENT CARE CONFERENCE
[address] [facility]  Inpatient Rehabilitation  Weekly Team Conference Note    Date: 2022  Patient Name: Justin Pabon        MRN: [id number]    : 1960  (60 y.o.)  Gender: male   Referring Practitioner: Raji Buchanan MD  Diagnosis: acute CVA       Interventions to be utilized toward barriers to discharge, per discipline:  [address] observed barriers to dc: Limited safety awareness, Decreased endurance, Upper extremity weakness, Lower extremity weakness and Medication managment  Nursing interventions:Assist with ADLs, medication management   Family Education: No  Fall Risk:  Yes      PHYSICAL THERAPY  Physical therapy observed barriers to dc:    Baseline: IND   Current level: min A with transfers and ambulation without AD, cga/sba with RW    Barriers to DC: balance, cognition, stairs    Needs in order to achieve dc home/next level of care: outpatient PT       Physical therapy interventions:   Current Treatment Recommendations: Strengthening,ROM,Balance training,Functional mobility training,Transfer training,Endurance training,Wheelchair mobility training,Gait training,Stair training,Neuromuscular re-education,Manual Therapy - Soft Tissue Mobilization,Patient/Caregiver education & training,Safety education & training,Home exercise program,Equipment evaluation, education, & procurement,Modalities,Therapeutic activities,ADL/Self-care training,IADL training    PT Goals:            Short Term Goals  Time Frame for Short term goals: 10 days (22)  Short term goal 1: Pt will perform bed mobility with min A  -: goal met: SBA  Short term goal 2: Pt will perform transfers with min A  -: goal met: CGA-Omero  Short term goal 3: Pt will ambulate 25' with quad cane and mod A  - Goal met  Short term goal 4: Pt will negotiate 4 steps with HR and mod A  - Goal met            Long Term Goals  Time Frame for Long term goals : 21 days (22)  Long term goal 1: Pt will perform evaluation, education, & procurement,Modalities,Patient/Caregiver education & training,Safety education & training,Self-Care / ADL,Home management training,Cognitive/Perceptual training,Coordination training,Sensory integraion    OT Goals:  Patient Goals   Patient goals : \"to get back to normal\"  Short Term Goals  Time Frame for Short term goals: within 7 days by 7/6/22  Short Term Goal 1: Pt will perform toilet transfer with Mod A and LRAD- GOAL MET 7/11/22 Pt completed toilet transfer with CGA. Short Term Goal 2: Pt will perform LB dressing with Min A and AE PRN- goal progressing 7/6  Short Term Goal 3: Pt will perform UB dressing with Min A and appropriate katlyn techniques- GOAL MET 7/7  Short Term Goal 4: Pt will perform functional dynamic balance activity in stance for >5 minutes with Min A and LRAD- GOAL MET 7/6  Short Term Goal 5: Pt will perform 2-3 grooming tasks with Min A for during tasks standing vs sitting with LRAD and Min A for balance. GOAL MET 7/11/22 Pt completed grooming tasks with min A. Long Term Goals  Time Frame for Long term goals : within 21 days by 7/20/22  Long Term Goal 1: Pt will perform TB dressing Mod I  Long Term Goal 2: Pt will perfrom TB bathing Mod I and AE PRN  Long Term Goal 3: Pt will perform simple IADL task in stance with LRAD for >7 minutes with supervision  Long Term Goal 4: Pt will self feed with R hand with Min A  Long Term Goal 5: Pt will increase Block and Box score on R hand by >4 blocks- goal progressing as of assessment 7/7 R hand = 1 block in 60 seconds. OT Assessment:  Assessment  Assessment: Pt agreeable to OT session. Pt completed mobility with RW to gym with CGA. Pt performed core exercises with fair strength for BUEs but difficulty grasping ball, straps on medicine ball placed around wrist. Pt completed grasp and release tasks to  items and transfer across room with use of Bioness and no AD for ambulating.  Pt completed walking for 4:15, 4:30, and 6:50 with CGA/min A and min VCs for clearance of R foot and safety with turns. Pt demonstrated improved safety with turning this date without AD. Pt required assist to place cylinders in R hand but good ability to grasp and release with assist of Bioness but able to grasp and release golf ball without assist to place in hand and good shoulder flexion to reach to shoulder height to place balls in basket. Continue POC. Activity Tolerance: Patient tolerated treatment well  Discharge Recommendations: 24 hour supervision or assist;Outpatient OT  OT Equipment Recommendations  Equipment Needed: Yes  Mobility Devices: ADL Assistive Devices  ADL Assistive Devices: Toileting - 3-in-1 Commode;Grab Bars - shower; Shower Chair with back; Reacher;Sock-Aid Hard;Elastic Shoe Laces  Other: Pt will benefit from a BSC to be used in the close confinements of the bedroom overnight to increase independence and safety during toileting considering pt's R sided hemiparesis and decreased dynamic balance. Safety Devices  Safety Devices in place: Yes  Type of devices: Call light within reach; All fall risk precautions in place; Patient at risk for falls;Gait belt;Nurse notified; Left in bed;Bed alarm in place    SPEECH THERAPY   Speech therapy observed barriers to dc:               Baseline: lives with brother, manages own finances, was not taking any meds PTA, active               Current level: mod-severe dysarthria, mild oropharyngeal dysphagia, mild high level cog               Barriers to DC: reduced speech intelligibility               Needs in order to achieve dc home/next level of care: carryover of compensatory strategies for improved speech intelligibility, tolerance of LRD, carryover of compensatory strategies for improved cognition    Speech Therapy interventions:  Dysphagia: Diet tolerance monitoring, safe swallow strategies, therapeutic PO trials with SLP, swallow strengthening exercises, vital stim/NMES  Speech/Language/Cognition: Compensatory strategy training and carryover, recall/STM, problem solving, reasoning, exec function, thought organization, attention. Dysphagia Goals:  Timeframe for Long-term Goals: 21 days (7/20/22)  Goal 1: The pt will tolerate safest and least restrictive diet without clinical s/s of aspiration or change in respiratory status. 07/012: progressing, ongoing  Short-term Goals  Timeframe for Short-term Goals: 14 days (7/13/22)  Goal 1: The pt will complete laryngeal/pharyngeal strengthening exercises in 10/10 trials, min cues. 07/12: progressing, ongoing  Goal 2. The patient will tolerate recommended diet without observed clinical signs of aspiration. 07/12: progressing, ongoing  Goal 3. The patient/caregiver will demonstrate understanding of compensatory strategies for improved swallowing safety. 07/12: progressing, ongoing  Goal 4. The patient will tolerate instrumental swallowing procedure. 07/12 - Goal Met - FEES completed   Goal 5. The pt will complete OME's for improved oral motor function, coordination, strength, and ROM in 10/10 trials, min cues. 07/12: progressing, ongoing    Speech/Language/Cog Goals:  Timeframe for Long-term Goals: 21 days (7/20/22)  Goal 1: The pt will effectively use reviewed compensatory strategies for improved safety and independence upon d/c. 07/12: progressing, ongoing  Goal 2: The pt will effectively use reviewed compensatory speech strategies for improved speech intelligibility in conversation with unfamiliar listener. 07/12: progressing, ongoing     Short-term Goals  Short-term Goals  Timeframe for Short-term Goals: 14 days (7/13/22)  Goal 1: The pt will complete graded recall tasks with 90% accuracy, min-no cues. 07/12: progressing, ongoing  Goal 2: The pt will complete executive function tasks (i.e. planning, deductive reasoning, inferential, functional organization tasks) with 90% accuracy no cues.  07/12: progressing, ongoing  Goal 3: The pt will complete high-level problem-solving tasks (*particularly related to safety scenarios) with 95% accuracy, no cues. 07/12: progressing, ongoing  Goal 4: The pt will complete articulatory agility tasks with 70% accuracy, mod cues. 3595: progressing, ongoing  Goal 5: The pt will effectively use compensatory speech strategies during structured speech tasks with 70% accuracy, mod cues. 07/12: progressing, ongoing  Timeframe for Short-term Goals: 14 days (7/13/22)    ST Assessment:  Pt alert and cooperative, agreeable to tx. FEES completed during session - see separate note for details. SLP recommends upgrade to IDDSI 0 Thin liquids. Continue regular solids. Meds whole in puree per pt preference. Diet orders updated in Epic; pt and RN made aware. Continue goals above. NUTRITION  Weight: 145 lb 15.1 oz (66.2 kg) (66.2 Kg) / Body mass index is 18.74 kg/m². Diet Order: ADULT ORAL NUTRITION SUPPLEMENT; Lunch, Dinner; Frozen Oral Supplement  ADULT ORAL NUTRITION SUPPLEMENT; Breakfast, Dinner; Standard 4 oz Oral Supplement  ADULT DIET; Regular  PO Meals Eaten (%): 76 - 100%  Education: No recommendation at this time    CASE MANAGEMENT  Assessment: 60 yr old male. Dx:Acute CVA (cerebrovascular accident). Independent PLOF. Lives with brother in a 2 level home with a level entry into home from the back. Patient has rolling walker, can and manual WC at home. Therapy recommendations are 24 hour supervision or assist;Outpatient PT/OT. DME needed 3 in 1 BSC and  shower chair with back. Interdisciplinary Goals:   1.) Pt will carryover use of speech strategies for improved speech intelligibility across all disciplines given min cues   2.) Pt will complete toileting with min A.  3.) pt will ambulate 150ft with LRAD with supervision   4.) Pt will have increased safety awareness with transfers       []  Family Training discussed at conference and to be scheduled.       Discharge Plan   Estimated discharge date: 7/22/2022   Destination: Home with outpatient therapy  Pass:No   Services at Discharge: OP PT/OT/ST  Equipment at Discharge: Shower chair, and RW with clam shell    Team Members Present at Conference:  : Sallie Friday   Occupational Therapist: Trever Rea, OTR/L  Physical Therapist: Sandra House, PT   Speech Therapist: Farooq Sinclair, [id number] [facility]  Nurse: Reta Pope RN  Dietician: Lucian Short RD, LD  : Shelly Polk, OTR/L  Psychiatry: N/A    Family members present at conference: No      I led this team conference and I approve the established interdisciplinary plan of care as documented within the medical record of [facility].     MD: Dr. Micheal Ackerman

## 2022-07-12 NOTE — PROGRESS NOTES
Speech Language Pathology  [facility]: ACUTE REHAB UNIT  SPEECH-LANGUAGE PATHOLOGY      [x] Daily  [] Weekly Care Conference Note  [] Discharge    Edith Gupta      HWF:3/04/1894  SPI:[id number]  Rehab Dx/Hx: Acute CVA (cerebrovascular accident) ([facility] [address] [I63.9]    Precautions: falls  Home situation: lives with brother, manages own finances, did not take any meds PTA, active   ST Dx: [] Aphasia  [x] Dysarthria  [] Apraxia   [] Oropharyngeal dysphagia [x] Cognitive Impairment  [] Other:   Date of Admit: 6/29/2022  Room #: 0166/0166-01    Current functional status (updated daily):         Pt being seen for : [x] Speech/Language Treatment  [] Dysphagia Treatment [x] Cognitive Treatment  [] Other:  Communication: []WFL  [] Aphasia  [x] Dysarthria  [] Apraxia  [] Pragmatic Impairment [] Non-verbal  [] Hearing Loss  [] Other:   Cognition: [] WFL  [x] Mild  [] Moderate  [] Severe [] Unable to Assess  [] Other:  Memory: [] WFL  [x] Mild  [] Moderate  [] Severe [] Unable to Assess  [] Other:  Behavior: [x] Alert  [x] Cooperative  [x]  Pleasant  [] Confused  [] Agitated  [] Uncooperative  [] Distractible [] Motivated  [] Self-Limiting [] Anxious  [] Other:  Endurance:  [x] Adequate for participation in SLP sessions  [] Reduced overall  [] Lethargic  [] Other:  Safety: [x] No concerns at this time  [] Reduced insight into deficits  []  Reduced safety awareness [] Not following call light procedures  [] Unable to Assess  [] Other:    Current Diet Order:ADULT ORAL NUTRITION SUPPLEMENT; Lunch, Dinner; Frozen Oral Supplement  ADULT ORAL NUTRITION SUPPLEMENT; Breakfast, Dinner; Standard 4 oz Oral Supplement  ADULT DIET;  Regular; Mildly Thick (Nectar)  Swallowing Precautions: upright for all intake, stay upright for at least 30 mins after intake, small bites/sips, set-up assistance / distant supervision with intake, alternate bites/sips, slow rate of intake, general aspiration precautions and hold PO and contact SLP if s/s of aspiration or worsening respiratory status develop.           Date: 7/12/2022      Tx session 1  1330 - 1430 Tx session 2  All tx needs met in session 1    Total Timed Code Min 0    Total Treatment Minutes 60    Individual Treatment Minutes 60    Group Treatment Minutes 0    Co-Treat Minutes 0    Variance/Reason:  0    Pain Denies    Pain Intervention [] RN notified  [] Repositioned  [] Intervention offered and patient declined  [x] N/A  [] Other: [] RN notified  [] Repositioned  [] Intervention offered and patient declined  [] N/A  [] Other:   Subjective     Pt alert and cooperative, agreeable to tx. Pt upright in bed for session. FEES completed. Objective:  Goals     Dysphagia Goals:  Short-term Goals  Timeframe for Short-term Goals: 14 days (7/13/22)    Goal 1. The patient will tolerate recommended diet without observed clinical signs of aspiration. FEES completed this date - see separate note for details. SLP recommends upgrade to IDDSI 0 Thin liquids. Continue IDDSI 7 Regular solids. Meds whole in puree per pt preference. Goal 2. The patient/caregiver will demonstrate understanding of compensatory strategies for improved swallowing safety. SLP edu pt re: safe swallow strategies - small bites/sips, slow rate, alternate liquids and solids, fully upright. Edu re: FEES results, findings, recs. Pt verbalized understanding. Goal 3. The patient will tolerate instrumental swallowing procedure Goal met 07/12/22. FEES completed. See separate speech therapy note for details. Goal 4: The pt will complete laryngeal/pharyngeal strengthening exercises in 10/10 trials, min cues. Goal not directly targeted. *New goal added 07/01/22*  Goal 5. The pt will complete OME's for improved oral motor function, coordination, strength, and ROM in 10/10 trials, min cues. Goal not directly targeted.      Speech/Lang/Cog goals:  Short-term Goals  Timeframe for Short-term Goals: 14 days (7/13/22)    Goal 1: The pt will complete graded recall tasks with 90% accuracy, min-no cues. Goal not directly targeted. Goal 2: The pt will complete executive function tasks (i.e. planning, deductive reasoning, inferential, functional organization tasks) with 90% accuracy no cues. Goal not directly targeted. Goal 3: The pt will complete high-level problem-solving tasks (*particularly related to safety scenarios) with 95% accuracy, no cues. Goal not directly targeted. Goal 4: The pt will complete articulatory agility tasks with 70% accuracy, mod cues. Goal not directly targeted. Goal 5: The pt will effectively use compensatory speech strategies during structured speech tasks with 70% accuracy, mod cues. Goal not directly targeted. Other areas targeted: N/a    Education:   SLP edu pt re: FEES recommendation / rationale, results, safe swallow strategies, diet recs    Safety Devices: [x] Call light within reach  [] Chair alarm activated  [x] Bed alarm activated  [] Other: [] Call light within reach  [] Chair alarm activated  [] Bed alarm activated  [] Other:    Assessment: Pt alert and cooperative, agreeable to tx. FEES completed during session - see separate note for details. SLP recommends upgrade to IDDSI 0 Thin liquids. Continue regular solids. Meds whole in puree per pt preference. Diet orders updated in Epic; pt and RN made aware. Continue goals above. Plan: Continue as per plan of care.       Additional Information:     Barriers toward progress: None  Discharge recommendations:  [] Home independently  [x] Home with assistance []  24 hour supervision  [] ECF [] Other:  Continued Tx Upon Discharge: ? [] Yes [] No [x] TBD based on progress while on ARU [] Vital Stim indicated [] Other:   Estimated discharge date: 07/22/22    Interventions used this date:  [] Speech/Language Treatment  [] Instruction in HEP [] Group [x] Dysphagia Treatment [] Cognitive Treatment   [] Other:      Total Time Breakdown / Charges    Time in Time out Total Time / units   Cognitive Tx      Speech Tx      Dysphagia Tx 1330 1430 60 min / 2 units    *FEES procedure and dysphagia tx     Electronically Signed by     Ed Altamirano MA CCC-SLP #[id number]  Speech Language Pathologist

## 2022-07-13 PROCEDURE — 97112 NEUROMUSCULAR REEDUCATION: CPT

## 2022-07-13 PROCEDURE — 6360000002 HC RX W HCPCS: Performed by: STUDENT IN AN ORGANIZED HEALTH CARE EDUCATION/TRAINING PROGRAM

## 2022-07-13 PROCEDURE — 92507 TX SP LANG VOICE COMM INDIV: CPT

## 2022-07-13 PROCEDURE — 1280000000 HC REHAB R&B

## 2022-07-13 PROCEDURE — 97110 THERAPEUTIC EXERCISES: CPT

## 2022-07-13 PROCEDURE — 92526 ORAL FUNCTION THERAPY: CPT

## 2022-07-13 PROCEDURE — 97535 SELF CARE MNGMENT TRAINING: CPT

## 2022-07-13 PROCEDURE — 6370000000 HC RX 637 (ALT 250 FOR IP): Performed by: STUDENT IN AN ORGANIZED HEALTH CARE EDUCATION/TRAINING PROGRAM

## 2022-07-13 PROCEDURE — 97530 THERAPEUTIC ACTIVITIES: CPT

## 2022-07-13 PROCEDURE — 97129 THER IVNTJ 1ST 15 MIN: CPT

## 2022-07-13 PROCEDURE — 97116 GAIT TRAINING THERAPY: CPT

## 2022-07-13 PROCEDURE — 97032 APPL MODALITY 1+ESTIM EA 15: CPT

## 2022-07-13 RX ADMIN — POLYVINYL ALCOHOL 2 DROP: 14 SOLUTION/ DROPS OPHTHALMIC at 08:12

## 2022-07-13 RX ADMIN — POLYVINYL ALCOHOL 2 DROP: 14 SOLUTION/ DROPS OPHTHALMIC at 22:04

## 2022-07-13 RX ADMIN — ATORVASTATIN CALCIUM 10 MG: 10 TABLET, FILM COATED ORAL at 22:04

## 2022-07-13 RX ADMIN — TRAZODONE HYDROCHLORIDE 50 MG: 50 TABLET ORAL at 22:04

## 2022-07-13 RX ADMIN — ACETAMINOPHEN 650 MG: 325 TABLET ORAL at 17:49

## 2022-07-13 RX ADMIN — ENOXAPARIN SODIUM 40 MG: 100 INJECTION SUBCUTANEOUS at 08:12

## 2022-07-13 RX ADMIN — ASPIRIN 81 MG: 81 TABLET, CHEWABLE ORAL at 08:12

## 2022-07-13 ASSESSMENT — PAIN DESCRIPTION - DESCRIPTORS: DESCRIPTORS: ACHING

## 2022-07-13 ASSESSMENT — PAIN SCALES - GENERAL
PAINLEVEL_OUTOF10: 3
PAINLEVEL_OUTOF10: 0

## 2022-07-13 ASSESSMENT — PAIN DESCRIPTION - LOCATION: LOCATION: BACK

## 2022-07-13 NOTE — PROGRESS NOTES
Shahzad Ball  7/13/2022  [id number]    Chief Complaint: Acute CVA (cerebrovascular accident) [facility])    Subjective:   Patient seen this AM.    Patient was discussed in rehab conference this morning with the entire rehab team that includes PT, OT, speech, nursing, dietary, social service, and rehab unit manager. We discussed how much progress the patient is making in therapies, and when the patient will be ready for discharge to home safely. We think the patient will be ready for discharge to home on 22 July. He still noted to have right neglect and be impulsive in his therapies. He requires contact-guard to standby assist with transfers and ADLs, and he will need a rolling walker at discharge. Speech did a swallow evaluation and he is now able to handle thin liquids. ROS: denies f/c, n/v, cp, sob    Objective:  Patient Vitals for the past 24 hrs:   BP Temp Temp src Pulse Resp SpO2   07/13/22 0800 100/67 97.5 °F (36.4 °C) Oral 90 18 95 %   07/12/22 2130 135/82 98 °F (36.7 °C) Oral 88 18 98 %     Gen: No distress, pleasant. HEENT: Normocephalic, atraumatic. CV: extremities well perfused  Resp: No respiratory distress. Abd: Soft, nontender   Ext: No edema. Neuro: Alert, oriented, appropriately interactive.  Sits on edge of bed without LOB    Wt Readings from Last 3 Encounters:   06/29/22 145 lb 15.1 oz (66.2 kg)   02/14/22 145 lb (65.8 kg)       Laboratory data:   Lab Results   Component Value Date    WBC 5.4 07/11/2022    HGB 15.1 07/11/2022    HCT 43.1 07/11/2022    .9 (H) 07/11/2022     07/11/2022       Lab Results   Component Value Date/Time     07/11/2022 09:16 AM    K 4.2 07/11/2022 09:16 AM     07/11/2022 09:16 AM    CO2 26 07/11/2022 09:16 AM    BUN 18 07/11/2022 09:16 AM    CREATININE 0.7 07/11/2022 09:16 AM    GLUCOSE 197 07/11/2022 09:16 AM    CALCIUM 10.4 07/11/2022 09:16 AM        Therapy progress:  PT  Position Activity Restriction  Other position/activity restrictions: R sided weakness; up with assist; Nectar thick liquids  Objective     Sit to Stand: Moderate Assistance  Stand to sit: Moderate Assistance  Bed to Chair: Moderate assistance (to L with cues for safety)  Device: Parallel Bars  Assistance: Minimal assistance  Distance: 8' and 20'  OT  PT Equipment Recommendations  Equipment Needed: No  Toilet - Technique: Ambulating  Equipment Used: Standard toilet (with L grab bar)  Toilet Transfers Comments: cues for safe hand placement; decreased proprioception when going to initate sit down to toilet   Assessment        SLP  Current Diet : Soft and Bite-Sized             Body mass index is 18.74 kg/m². Assessment and Plan:  Amanda Galvan is a 60year old male with a past medical history significant for left sided stroke due to ICA dissection (2018) without residual deficits, HLD, and nicotine dependence who presented to  on 6/26/22 with acute onset of right face, arm, and leg weakness, found to have acute CVA. He was admitted to [facility] on 6/29/22 due to functional deficits below his baseline.      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 Follow bowel movements. Enema or suppository if needed.      Bladder: Check PVR x 3. [address] if PVR > 200ml or if any volume is > 500 ml.      Sleep: Trazodone provided prn.      Follow up appointments: PCP, Stroke clinic    Services: home health versus outpatient [facility]  EDOD: 7/22/22    Dot Snellen. Heis, MD 7/13/2022, 8:33 AM

## 2022-07-13 NOTE — PROGRESS NOTES
and shaving in stance, assist to dispense toothpaste/shaving cream  Upper Extremity Bathing  Assistance Level: Supervision  Lower Extremity Bathing  Assistance Level: Stand by assist  Skilled Clinical Factors: while standing to bathe buttocks/back  Upper Extremity Dressing  Assistance Level: Supervision;Verbal cues  Skilled Clinical Factors: min VCs for katlyn technique  Lower Extremity Dressing  Assistance Level: Minimal assistance  Skilled Clinical Factors: SPV when pulling over hips, min A to tie drawstring only  Putting On/Taking Off Footwear  Assistance Level: Supervision;Verbal cues  Skilled Clinical Factors: min VCs for katlyn technique to don socks  Toileting  Assistance Level: Minimal assistance  Skilled Clinical Factors: to tie drawstring on pants after toileting  Toilet Transfers  Technique: Stand step  Equipment: Standard toilet;Grab bars  Additional Factors: With handrails  Assistance Level: Contact guard assist  Tub/Shower Transfers  Type: Shower  Transfer From: Standing without device  Transfer To: Tub transfer bench  Assistance Level: Stand by assist  Skilled Clinical Factors: min A to step over tub, Pt states he will be showering at mother's house in walk-in shower until he is stronger. Functional Mobility  Activity: To/From therapy gym; To/From bathroom; Retrieve items;Transport items  Assistance Level: Contact guard assist  Skilled Clinical Factors: Pt bumping into items on R side multiple times during session  Transfers  Surface: To chair with arms;From chair with arms;Standard toilet  Additional Factors: Set-up; Verbal cues; Increased time to complete;Hand placement cues  Device: Walker  Sit to Stand  Assistance Level: Supervision  Stand to Sit  Assistance Level: Supervision  Bed To/From Chair  Technique: Stand step  Assistance Level: Contact guard assist  Skilled Clinical Factors: no AD   OT Exercises  Dynamic Sitting Balance Exercises: x20 with 2# medicine ball for obliques, core rotation, and elbow flexion     Assessment  Assessment  Assessment: Pt demonstrated improved safety and balance for ADLs with SBA for bathing and dressing, except assist to manage ties/fasteners. Pt demonstrated good standing tolerance to stand for 9 minutes at sink and 6-7 minutes x2 for NMR when transferring golf balls in and out of microwave. Pt required min A to flex R shoulder to height needed for reaching microwave. Pt tolerated e-stim well and demonstrated good grasp of items with assist of e-stim but still poor grasp without assist of e-stim. Pt completed tub transfer with min A and educated on use of TTB but pt stated he will be showering at mother's house in walk-in shower and is does not need DME in his shower at this time. Continue POC. Activity Tolerance: Patient tolerated treatment well  Discharge Recommendations: 24 hour supervision or assist;Outpatient OT  OT Equipment Recommendations  Equipment Needed: Yes  Mobility Devices: ADL Assistive Devices  ADL Assistive Devices: Reacher;Sock-Aid Hard;Elastic Shoe Laces;Grab Bars - tub;Transfer Tub Bench  Safety Devices  Safety Devices in place: Yes  Type of devices: Call light within reach; All fall risk precautions in place; Patient at risk for falls;Gait belt;Nurse notified; Left in chair;Chair alarm in place    Patient Education  Education  Education Given To: Patient  Education Provided: Role of Therapy; ADL Function;Equipment; Mobility Training;Transfer Training;Energy Conservation; Safety;Precautions;Plan of Care;DME/Home Modifications; Home Exercise Program  Education Provided Comments: disease specific: ADL katlyn techniques, dynamic vs standing balance, safety with foot placement and clearance, NMR with e-stim, visual scanning, R sided awareness  Education Method: Verbal;Demonstration  Barriers to Learning: Cognition  Education Outcome: Verbalized understanding;Continued education needed    Plan  Plan  Times per Week: 5 of 7 days  Specific Instructions for Next Treatment: IADL task  Current Treatment Recommendations: Strengthening;Balance training;Functional mobility training; Endurance training;Neuromuscular re-education;Positioning;Equipment evaluation, education, & procurement; Modalities; Patient/Caregiver education & training; Safety education & training;Self-Care / ADL; Home management training;Cognitive/Perceptual training;Coordination training;Sensory integraion    Goals  Short Term Goals  Time Frame for Short term goals: within 7 days by 7/6/22  Short Term Goal 1: Pt will perform toilet transfer with Mod A and LRAD- GOAL MET 7/11/22 Pt completed toilet transfer with CGA. Short Term Goal 2: Pt will perform LB dressing with Min A and AE PRN- GOAL MET 7/13/22 Pt performed LB dressing with min A. Short Term Goal 3: Pt will perform UB dressing with Min A and appropriate katlyn techniques- GOAL MET 7/7  Short Term Goal 4: Pt will perform functional dynamic balance activity in stance for >5 minutes with Min A and LRAD- GOAL MET 7/6  Short Term Goal 5: Pt will perform 2-3 grooming tasks with Min A for during tasks standing vs sitting with LRAD and Min A for balance. GOAL MET 7/11/22 Pt completed grooming tasks with min A. Long Term Goals  Time Frame for Long term goals : within 21 days by 7/20/22  Long Term Goal 1: Pt will perform TB dressing Mod I  Long Term Goal 2: Pt will perfrom TB bathing Mod I and AE PRN  Long Term Goal 3: Pt will perform simple IADL task in stance with LRAD for >7 minutes with supervision  Long Term Goal 4: Pt will self feed with R hand with Min A  Long Term Goal 5: Pt will increase Block and Box score on R hand by >4 blocks- goal progressing as of assessment 7/7 R hand = 1 block in 60 seconds.       Therapy Time   Individual Concurrent Group Co-treatment   Time In 0900         Time Out 1030         Minutes 90         Timed Code Treatment Minutes: Louis Ley

## 2022-07-13 NOTE — PROGRESS NOTES
Speech Language Pathology  [facility]: ACUTE REHAB UNIT  SPEECH-LANGUAGE PATHOLOGY      [x] Daily  [] Weekly Care Conference Note  [] Discharge    Isaiah Allen      [place]  GAT:[id number]  Rehab Dx/Hx: Acute CVA (cerebrovascular accident) ([facility] [address] [I63.9]    Precautions: falls  Home situation: lives with brother, manages own finances, did not take any meds PTA, active   ST Dx: [] Aphasia  [x] Dysarthria  [] Apraxia   [] Oropharyngeal dysphagia [x] Cognitive Impairment  [] Other:   Date of Admit: 2022  Room #: 0166/0166-01    Current functional status (updated daily):         Pt being seen for : [x] Speech/Language Treatment  [] Dysphagia Treatment [x] Cognitive Treatment  [] Other:  Communication: []WFL  [] Aphasia  [x] Dysarthria  [] Apraxia  [] Pragmatic Impairment [] Non-verbal  [] Hearing Loss  [] Other:   Cognition: [] WFL  [x] Mild  [] Moderate  [] Severe [] Unable to Assess  [] Other:  Memory: [] WFL  [x] Mild  [] Moderate  [] Severe [] Unable to Assess  [] Other:  Behavior: [x] Alert  [x] Cooperative  [x]  Pleasant  [] Confused  [] Agitated  [] Uncooperative  [] Distractible [] Motivated  [] Self-Limiting [] Anxious  [] Other:  Endurance:  [x] Adequate for participation in SLP sessions  [] Reduced overall  [] Lethargic  [] Other:  Safety: [x] No concerns at this time  [] Reduced insight into deficits  []  Reduced safety awareness [] Not following call light procedures  [] Unable to Assess  [] Other:    Current Diet Order:ADULT ORAL NUTRITION SUPPLEMENT; Lunch, Dinner; Frozen Oral Supplement  ADULT ORAL NUTRITION SUPPLEMENT; Breakfast, Dinner; Standard 4 oz Oral Supplement  ADULT DIET;  Regular  Swallowing Precautions: upright for all intake, stay upright for at least 30 mins after intake, small bites/sips, set-up assistance / distant supervision with intake, alternate bites/sips, slow rate of intake, general aspiration precautions and hold PO and contact SLP if s/s of aspiration or worsening respiratory status develop.           Date: 7/13/2022      Tx session 1  [id number] Tx session 2  [id number]   Total Timed Code Min 0 20   Total Treatment Minutes 30 30   Individual Treatment Minutes 30 30   Group Treatment Minutes 0 0   Co-Treat Minutes 0 0   Variance/Reason:  0 0   Pain Denies Denies   Pain Intervention [] RN notified  [] Repositioned  [] Intervention offered and patient declined  [x] N/A  [] Other: [] RN notified  [] Repositioned  [] Intervention offered and patient declined  [x] N/A  [] Other:   Subjective     Pt alert and oriented, cooperative and agreeable to participate in therapy. Pt seen sitting upright in bedside chair, just finished breakfast meal.  Pt alert and oriented, cooperative and agreeable to participate in therapy. Pt seen sitting upright in bedside chair. Objective:  Goals     Dysphagia Goals:  Short-term Goals  Timeframe for Short-term Goals: 14 days (7/13/22)    Goal 1. The patient will tolerate recommended diet without observed clinical signs of aspiration. Pt just finished breakfast meal upon SLP arrival, reporting no difficulties. Pt observed with one pill whole in puree tolerating without any difficulties. TL via cup:  -occasional wet vocal quality but able to clear with use of hard throat clear and swallow  -no coughing or intentional throat clearing noted    Will continue to monitor diet upgrade 1-2x. Did not target. Goal 2. The patient/caregiver will demonstrate understanding of compensatory strategies for improved swallowing safety. SLP edu pt re: safe swallow strategies - small bites/sips, slow rate, alternate liquids and solids, fully upright. Pt verbalizes and demonstrates understanding. Did not target. Goal met 07/12/22. FEES completed. See separate speech therapy note for details. Goal met 07/12/22. FEES completed. See separate speech therapy note for details.      Goal 4: The pt will complete laryngeal/pharyngeal strengthening exercises in 10/10 trials, min cues. Did not target. Did not target. *New goal added 07/01/22*  Goal 5. The pt will complete OME's for improved oral motor function, coordination, strength, and ROM in 10/10 trials, min cues. OME's completed:  -smile: x20  -pucker: x20  -smile/pucker: x20  -open wide: x20  -cheek puff hold for 3 secs: x20  -eye/cheek squint: x20   Did not target. Speech/Lang/Cog goals:  Short-term Goals  Timeframe for Short-term Goals: 14 days (7/13/22)    Goal 1: The pt will complete graded recall tasks with 90% accuracy, min-no cues. Did not target. Functional recall/higher level alternating attention task:  -pt given a category associated with each suit in a deck of cards  -ex: diamonds- drinks, spades- states, hearts-food, clubs-colors  -100% acc with recall  -100% acc with alternating attention  -100% acc with naming     Goal 2: The pt will complete executive function tasks (i.e. planning, deductive reasoning, inferential, functional organization tasks) with 90% accuracy no cues. Did not target. Did not target. Goal 3: The pt will complete high-level problem-solving tasks (*particularly related to safety scenarios) with 95% accuracy, no cues. Did not target. Did not target. Goal 4: The pt will complete articulatory agility tasks with 70% accuracy, mod cues. Tongue twisters:  -66% acc indep improving to 100% acc given min cues  -pt benefited from cues for slow rate and over articulation   Did not directly target. Goal 5: The pt will effectively use compensatory speech strategies during structured speech tasks with 70% accuracy, mod cues. Pt continues to require mod cues during conversations with SLP for improved intelligibility, but min cues required for speech strategies during structured speech tasks.     Speech targeted during naming of items during card task (see goal 1 above)  -84% acc indep improving to 100% acc given min cues for use of slow rate and over articulation   Other areas targeted: N/a    Education:    edu provided re: speech strategies for improved intelligibility, safe swallow strategies   edu provided re: speech strategies, compensatory memory strategies    Safety Devices: [x] Call light within reach  [x] Chair alarm activated  [] Bed alarm activated  [] Other: [x] Call light within reach  [x] Chair alarm activated  [] Bed alarm activated  [] Other:    Assessment: Tx session 1: Pt pleasant and cooperative, agreeable to participate. Pt finished breakfast meal upon SLP arrival, reported no difficulties. Pt observed with med whole in puree, no difficulties or s/s of aspiration. Pt did present with occasional wet vocal quality with TL via cup but able to clear with throat clear and swallow. Pt completed all OME's indep, overall improved right sided ROM, strength, and coordination. Pt completed tongue twisters with 66% acc given min cues for use of speech strategies. However, continues to require mod cues for use of speech strategies during conversations. Tx session 2: Pt pleasant and cooperative, agreeable to participate. Pt completed higher level alternating attention/recall/naming task with 100% acc indep. Pt's speech intelligibility assessed during the cognitive task, presenting with 84% intelligibility requiring min cues for use of slow rate and over articulation. Pt continues to make good progress towards all goals and remains strongly motivated to improve. Continue goals above. Plan: Continue as per plan of care.       Additional Information:     Barriers toward progress: None  Discharge recommendations:  [] Home independently  [x] Home with assistance []  24 hour supervision  [] ECF [] Other:  Continued Tx Upon Discharge: ? [] Yes [] No [x] TBD based on progress while on ARU [] Vital Stim indicated [] Other:   Estimated discharge date: 07/22/22    Interventions used this date:  [] Speech/Language Treatment  [] Instruction in HEP [] Group [x] Dysphagia Treatment [x] Cognitive Treatment   [] Other: Total Time Breakdown / Charges    Time in Time out Total Time / units   Cognitive Tx 1400 1420 20 min/ 1 unit    Speech Tx 0820  1420 0830  1430 10 min/ 1 unit   10 min/ 1 unit    Dysphagia Tx 0800 0820 20 min/ 1 unit      Electronically Signed by     Tony Espinosa. A CCC-SLP  Speech-Language Pathologist  [id number]

## 2022-07-13 NOTE — PROGRESS NOTES
Physical Therapy  Facility/Department: [address] [place] [address]  Daily Treatment Note  NAME: Teresa Green  : 1960  MRN: [id number]    Date of Service: 2022    Discharge Recommendations:  24 hour supervision or assist,Patient would benefit from continued therapy after discharge   PT Equipment Recommendations  Equipment Needed: No    Patient Diagnosis(es): There were no encounter diagnoses. Assessment   Assessment: pt progressing toward meeting IPR goals, pt used RW for ambulation this session, pt with improved steadiness requiring decrease level of assist for transfers and ambulation,recommend continued use of RW ; pt will benefit from continued IPR Skilled PT to address functional mobility deficits  Activity Tolerance: Patient tolerated treatment well  Equipment Needed: No     Plan    Plan  Plan:  minutes of therapy at least 5 out of 7 days a week  Plan weeks: 21 days  Specific Instructions for Next Treatment: R katlyn strength training and continue mobility progression  Current Treatment Recommendations: Strengthening;ROM;Balance training;Functional mobility training;Transfer training; Endurance training; Wheelchair mobility training;Gait training;Stair training;Neuromuscular re-education;Manual Therapy - Soft Tissue Mobilization;Patient/Caregiver education & training; Safety education & training;Home exercise program;Equipment evaluation, education, & procurement; Modalities; Therapeutic activities;ADL/Self-care training;IADL training     Restrictions  Restrictions/Precautions  Restrictions/Precautions: Fall Risk,Swallowing - Thickened Liquids,General Precautions  Position Activity Restriction  Other position/activity restrictions: R sided weakness; up with assist     Subjective    Subjective  Subjective: Pt sitting up in chair upon approach.  Agreeable to PT tx  Pain: none reported throughout session  Orientation  Overall Orientation Status: Within Functional Limits  Cognition  Overall Cognitive Status: Exceptions  Arousal/Alertness: Appropriate responses to stimuli  Following Commands: Follows multistep commands consistently; Follows multistep commands with repitition  Attention Span: Attends with cues to redirect  Memory: Appears intact  Safety Judgement: Decreased awareness of need for assistance;Decreased awareness of need for safety  Problem Solving: Assistance required to generate solutions;Assistance required to identify errors made  Insights: Decreased awareness of deficits  Initiation: Requires cues for some  Sequencing: Requires cues for some     Objective   Vitals   Vitals:    07/13/22 0800   BP: 100/67   Pulse: 90   Resp: 18   Temp: 97.5 °F (36.4 °C)   SpO2: 95%        PT Exercises  Exercise Treatment: obstacle course (5 cones) stepping over unilater w/ focus on R 6x - pt knocked over 1 cone each time until last trial.  Lui rail exercises including BWD amb 15' x 3, side stepping 15' x 4. Dynamic Standing Balance Exercises: standing balance while doing unilat yellow TB x 10 each (hip ext, add, abd) R & L.  standing balance while doing unilat yellow TB x 10 each unilat, then bilateral rows with feet in stride stance and focuas on WS L<>R.   Sit<> stands form bed side chair to rw x 5 with improved technique from falling back to [employer] 1st trial to sba/cga last 3. Safety Devices  Type of Devices: All fall risk precautions in place;Call light within reach; Chair alarm in place;Gait belt;Nurse notified; Left in chair;Patient at risk for falls       Goals  Short Term Goals  Time Frame for Short term goals: 10 days (7/9/22)  Short term goal 1: Pt will perform bed mobility with min A  -7/08: goal met: SBA  Short term goal 2: Pt will perform transfers with min A  -7/08: goal met: CGA-Omero  Short term goal 3: Pt will ambulate 25' with quad cane and mod A  -7/05 Goal met  Short term goal 4: Pt will negotiate 4 steps with HR and mod A  -7/05 Goal met  Long Term Goals  Time Frame for Long term goals : 21 days (7/20/22)  Long term goal 1: Pt will perform bed mobility mod I  Long term goal 2: Pt will perform transfers with supervision  Long term goal 3: Pt will ambulate 48' with quad cane and CGA 7/05 update goal: Pt will ambulate 150' with RW and clamshell with supervision  Long term goal 4: Pt will propel wheelchair 150' mod I; 7/05 discontinue goal  Long term goal 5: Pt will negotiate 4 steps with HR and min A  Patient Goals   Patient goals : \"to be able to walk again and use my R arm\"    Education   Pacing activities, including pace of ambulation, safety w/ transfers/mobility, safety with use of mask and rw during treatment.     Therapy Time   Individual Concurrent Group Co-treatment   Time In 1230         Time Out 1330         Minutes 60         Timed Code Treatment Minutes: 1150 [facility]

## 2022-07-14 LAB
ANION GAP SERPL CALCULATED.3IONS-SCNC: 11 MMOL/L (ref 3–16)
BASOPHILS ABSOLUTE: 0 K/UL (ref 0–0.2)
BASOPHILS RELATIVE PERCENT: 0.9 %
BUN BLDV-MCNC: 15 MG/DL (ref 7–20)
CALCIUM SERPL-MCNC: 10.3 MG/DL (ref 8.3–10.6)
CHLORIDE BLD-SCNC: 101 MMOL/L (ref 99–110)
CO2: 27 MMOL/L (ref 21–32)
CREAT SERPL-MCNC: 0.7 MG/DL (ref 0.8–1.3)
EOSINOPHILS ABSOLUTE: 0.1 K/UL (ref 0–0.6)
EOSINOPHILS RELATIVE PERCENT: 2 %
GFR AFRICAN AMERICAN: >60
GFR NON-AFRICAN AMERICAN: >60
GLUCOSE BLD-MCNC: 115 MG/DL (ref 70–99)
HCT VFR BLD CALC: 41.9 % (ref 40.5–52.5)
HEMOGLOBIN: 14.7 G/DL (ref 13.5–17.5)
LYMPHOCYTES ABSOLUTE: 1.3 K/UL (ref 1–5.1)
LYMPHOCYTES RELATIVE PERCENT: 29.2 %
MCH RBC QN AUTO: 36.8 PG (ref 26–34)
MCHC RBC AUTO-ENTMCNC: 35 G/DL (ref 31–36)
MCV RBC AUTO: 105.2 FL (ref 80–100)
MONOCYTES ABSOLUTE: 0.7 K/UL (ref 0–1.3)
MONOCYTES RELATIVE PERCENT: 15.9 %
NEUTROPHILS ABSOLUTE: 2.4 K/UL (ref 1.7–7.7)
NEUTROPHILS RELATIVE PERCENT: 52 %
PDW BLD-RTO: 13.9 % (ref 12.4–15.4)
PLATELET # BLD: 234 K/UL (ref 135–450)
PMV BLD AUTO: 8.4 FL (ref 5–10.5)
POTASSIUM REFLEX MAGNESIUM: 4.3 MMOL/L (ref 3.5–5.1)
RBC # BLD: 3.98 M/UL (ref 4.2–5.9)
SODIUM BLD-SCNC: 139 MMOL/L (ref 136–145)
WBC # BLD: 4.6 K/UL (ref 4–11)

## 2022-07-14 PROCEDURE — 1280000000 HC REHAB R&B

## 2022-07-14 PROCEDURE — 97112 NEUROMUSCULAR REEDUCATION: CPT

## 2022-07-14 PROCEDURE — 97130 THER IVNTJ EA ADDL 15 MIN: CPT

## 2022-07-14 PROCEDURE — 6360000002 HC RX W HCPCS: Performed by: STUDENT IN AN ORGANIZED HEALTH CARE EDUCATION/TRAINING PROGRAM

## 2022-07-14 PROCEDURE — 97535 SELF CARE MNGMENT TRAINING: CPT

## 2022-07-14 PROCEDURE — 80048 BASIC METABOLIC PNL TOTAL CA: CPT

## 2022-07-14 PROCEDURE — 97116 GAIT TRAINING THERAPY: CPT

## 2022-07-14 PROCEDURE — 97110 THERAPEUTIC EXERCISES: CPT

## 2022-07-14 PROCEDURE — 85025 COMPLETE CBC W/AUTO DIFF WBC: CPT

## 2022-07-14 PROCEDURE — 92507 TX SP LANG VOICE COMM INDIV: CPT

## 2022-07-14 PROCEDURE — 92526 ORAL FUNCTION THERAPY: CPT

## 2022-07-14 PROCEDURE — 97530 THERAPEUTIC ACTIVITIES: CPT

## 2022-07-14 PROCEDURE — 6370000000 HC RX 637 (ALT 250 FOR IP): Performed by: STUDENT IN AN ORGANIZED HEALTH CARE EDUCATION/TRAINING PROGRAM

## 2022-07-14 PROCEDURE — 36415 COLL VENOUS BLD VENIPUNCTURE: CPT

## 2022-07-14 PROCEDURE — 97129 THER IVNTJ 1ST 15 MIN: CPT

## 2022-07-14 RX ADMIN — TRAZODONE HYDROCHLORIDE 50 MG: 50 TABLET ORAL at 21:18

## 2022-07-14 RX ADMIN — ENOXAPARIN SODIUM 40 MG: 100 INJECTION SUBCUTANEOUS at 08:10

## 2022-07-14 RX ADMIN — POLYVINYL ALCOHOL 2 DROP: 14 SOLUTION/ DROPS OPHTHALMIC at 08:11

## 2022-07-14 RX ADMIN — ACETAMINOPHEN 650 MG: 325 TABLET ORAL at 05:54

## 2022-07-14 RX ADMIN — ATORVASTATIN CALCIUM 10 MG: 10 TABLET, FILM COATED ORAL at 21:18

## 2022-07-14 RX ADMIN — ACETAMINOPHEN 650 MG: 325 TABLET ORAL at 21:18

## 2022-07-14 RX ADMIN — POLYVINYL ALCOHOL 2 DROP: 14 SOLUTION/ DROPS OPHTHALMIC at 21:18

## 2022-07-14 RX ADMIN — ASPIRIN 81 MG: 81 TABLET, CHEWABLE ORAL at 08:10

## 2022-07-14 ASSESSMENT — PAIN DESCRIPTION - LOCATION
LOCATION: SHOULDER

## 2022-07-14 ASSESSMENT — PAIN - FUNCTIONAL ASSESSMENT
PAIN_FUNCTIONAL_ASSESSMENT: PREVENTS OR INTERFERES SOME ACTIVE ACTIVITIES AND ADLS

## 2022-07-14 ASSESSMENT — PAIN SCALES - GENERAL
PAINLEVEL_OUTOF10: 7
PAINLEVEL_OUTOF10: 3
PAINLEVEL_OUTOF10: 7
PAINLEVEL_OUTOF10: 3
PAINLEVEL_OUTOF10: 4
PAINLEVEL_OUTOF10: 3
PAINLEVEL_OUTOF10: 2

## 2022-07-14 ASSESSMENT — PAIN DESCRIPTION - ORIENTATION
ORIENTATION: RIGHT

## 2022-07-14 ASSESSMENT — PAIN DESCRIPTION - ONSET
ONSET: GRADUAL
ONSET: GRADUAL

## 2022-07-14 ASSESSMENT — PAIN DESCRIPTION - FREQUENCY
FREQUENCY: INTERMITTENT
FREQUENCY: INTERMITTENT

## 2022-07-14 ASSESSMENT — PAIN DESCRIPTION - DESCRIPTORS
DESCRIPTORS: ACHING;SORE

## 2022-07-14 ASSESSMENT — PAIN DESCRIPTION - PAIN TYPE: TYPE: ACUTE PAIN

## 2022-07-14 NOTE — PROGRESS NOTES
Jammie Duty  7/14/2022  [id number]    Chief Complaint: Acute CVA (cerebrovascular accident) [facility])    Subjective:   No acute events overnight. Today Ti Wilcox is seen in his room. He reports feeling sore from therapies but is very pleased with his progress. He denies other acute complaints at this time. ROS: denies f/c, n/v, cp, sob    Objective:  Patient Vitals for the past 24 hrs:   BP Temp Temp src Pulse Resp SpO2   07/14/22 1419 111/75 -- -- 84 -- 98 %   07/14/22 0829 -- -- -- 83 -- --   07/14/22 0800 100/68 97.4 °F (36.3 °C) Oral 83 17 95 %   07/13/22 2200 125/88 98.3 °F (36.8 °C) Oral 81 18 98 %     Gen: No distress, pleasant. HEENT: Normocephalic, atraumatic. CV: extremities well perfused  Resp: No respiratory distress. Abd: Soft, nontender   Ext: No edema. Neuro: Alert, oriented, appropriately interactive. Sits on edge of bed without LOB    Wt Readings from Last 3 Encounters:   06/29/22 145 lb 15.1 oz (66.2 kg)   02/14/22 145 lb (65.8 kg)       Laboratory data:   Lab Results   Component Value Date    WBC 4.6 07/14/2022    HGB 14.7 07/14/2022    HCT 41.9 07/14/2022    .2 (H) 07/14/2022     07/14/2022       Lab Results   Component Value Date/Time     07/14/2022 07:21 AM    K 4.3 07/14/2022 07:21 AM     07/14/2022 07:21 AM    CO2 27 07/14/2022 07:21 AM    BUN 15 07/14/2022 07:21 AM    CREATININE 0.7 07/14/2022 07:21 AM    GLUCOSE 115 07/14/2022 07:21 AM    CALCIUM 10.3 07/14/2022 07:21 AM        Therapy progress:  PT  Position Activity Restriction  Other position/activity restrictions: R sided weakness; up with assist  Objective     Sit to Stand:  Moderate Assistance  Stand to sit: Moderate Assistance  Bed to Chair: Moderate assistance (to L with cues for safety)  Device: Parallel Bars  Assistance: Minimal assistance  Distance: 8' and 20'  OT  PT Equipment Recommendations  Equipment Needed: No  Toilet - Technique: Ambulating  Equipment Used: Standard toilet (with L grab bar)  Toilet Transfers Comments: cues for safe hand placement; decreased proprioception when going to initate sit down to toilet   Assessment        SLP  Current Diet : Soft and Bite-Sized             Body mass index is 18.74 kg/m². Assessment and Plan:  Yo Correa is a 60year old male with a past medical history significant for left sided stroke due to ICA dissection (2018) without residual deficits, HLD, and nicotine dependence who presented to  on 6/26/22 with acute onset of right face, arm, and leg weakness, found to have acute CVA. He was admitted to [facility] on 6/29/22 due to functional deficits below his baseline.      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 Follow bowel movements. Enema or suppository if needed.      Bladder: Check PVR x 3. [address] if PVR > 200ml or if any volume is > 500 ml.      Sleep: Trazodone provided prn.      Follow up appointments: PCP, Stroke clinic    Services: home health versus outpatient PT, OT, ST  EDOD: 7/22/22    Tramaine Charles.  Leana Crockett MD 7/14/2022, 7:25 PM

## 2022-07-14 NOTE — PROGRESS NOTES
Occupational Therapy  Facility/Department: [facility]  Rehabilitation Occupational Therapy Daily Treatment Note    Date: 22  Patient Name: Gayle Roberson       Room: 4452/0411-38  MRN: [id number]  Account: [de-identified]   : 1960  (63 y.o.) Gender: male                    Past Medical History:  has a past medical history of CVA (cerebral vascular accident) ([address] Past Surgical History:   has no past surgical history on file. Restrictions  Restrictions/Precautions: Fall Risk;General Precautions  Other position/activity restrictions: R sided weakness; up with assist    Subjective  Subjective: Pt in recliner, feeling tired and sore, agreeable to OT session, soreness 2/10, BP 98/68, HR 82, O2 sats 97% on RA. Restrictions/Precautions: Fall Risk;General Precautions             Objective     Cognition  Overall Cognitive Status: Exceptions  Arousal/Alertness: Appropriate responses to stimuli  Following Commands: Follows multistep commands consistently; Follows multistep commands with repitition  Attention Span: Attends with cues to redirect  Memory: Appears intact  Safety Judgement: Decreased awareness of need for assistance;Decreased awareness of need for safety  Problem Solving: Assistance required to generate solutions;Assistance required to identify errors made  Insights: Decreased awareness of deficits  Initiation: Requires cues for some  Sequencing: Requires cues for some  Orientation  Overall Orientation Status: Within Functional Limits   Perception  Overall Perceptual Status: Impaired  Unilateral Attention: Cues to attend to right side of body;Cues to attend right visual field  Initiation: Cues to initiate tasks  Motor Planning: Appears intact  Perseveration: Perseverates during conversation     ADL  Grooming/Oral Hygiene  Assistance Level: Supervision  Skilled Clinical Factors: standing at sink for oral hygiene, improved ability to manage toothpaste without assist          Functional Mobility  Activity: To/From therapy gym; To/From bathroom; Retrieve items;Transport items  Assistance Level: Contact guard assist  Skilled Clinical Factors: SBA/CGA, improved navigation with cues before mobility to attend to R side  Transfers  Surface: To chair with arms;From chair with arms;Standard toilet  Additional Factors: Set-up; Verbal cues; Increased time to complete;Hand placement cues  Device: Walker  Sit to Stand  Assistance Level: Supervision  Stand to Sit  Assistance Level: Supervision  Bed To/From Chair  Technique: Stand step  Assistance Level: Supervision   Neuromuscular Education  Neuromuscular education: Yes  NDT Treatment: Upper extremity;Standing  Facilitation techniques: OT facilitated weight bearing through BUEs on dowel rossi while pt walking and pushing dowel rossi at shoulder height for 25 feet x4 with CGA for balance and good weight bearing and extended elbows, BUEs and especially RUE fatiguing as task progressed  Weight Bearing  Weight Bearing Technique: Yes  RUE Weight Bearing: Extended arm standing  LUE Weight Bearing: Extended arm standing  OT Exercises  Dynamic Sitting Balance Exercises: x20 with 2# medicine ball for obliques, core rotation, and elbow flexion/extension (assist for full elbow extension), a-p leaning, chest press; x20 rows forward and backward with 2# dowel rossi     Assessment  Assessment  Assessment: First Session: Pt agreeable to OT session. Pt completed mobility around gym and unit with CGA/SBA and min VCs initially for attention to R side but improved attention to R side following cues. Pt completed UE and core exercises while seated on mat table with fair strength and good incorporation of RUE. Pt still easily distracted when walking and nearly ran into doorframe on R side when talking to another therapist.   Second Session: Pt demonstrated improved use of R hand to grasp toothpaste when removing cap with LUE during oral hygiene.  Pt requested to remove Bioness during Strengthening;Balance training;Functional mobility training; Endurance training;Neuromuscular re-education;Positioning;Equipment evaluation, education, & procurement; Modalities; Patient/Caregiver education & training; Safety education & training;Self-Care / ADL; Home management training;Cognitive/Perceptual training;Coordination training;Sensory integraion    Goals  Short Term Goals  Time Frame for Short term goals: within 7 days by 7/6/22  Short Term Goal 1: Pt will perform toilet transfer with Mod A and LRAD- GOAL MET 7/11/22 Pt completed toilet transfer with CGA. Short Term Goal 2: Pt will perform LB dressing with Min A and AE PRN- GOAL MET 7/13/22 Pt performed LB dressing with min A. Short Term Goal 3: Pt will perform UB dressing with Min A and appropriate katlyn techniques- GOAL MET 7/7  Short Term Goal 4: Pt will perform functional dynamic balance activity in stance for >5 minutes with Min A and LRAD- GOAL MET 7/6  Short Term Goal 5: Pt will perform 2-3 grooming tasks with Min A for during tasks standing vs sitting with LRAD and Min A for balance. GOAL MET 7/11/22 Pt completed grooming tasks with min A. Long Term Goals  Time Frame for Long term goals : within 21 days by 7/20/22  Long Term Goal 1: Pt will perform TB dressing Mod I  Long Term Goal 2: Pt will perfrom TB bathing Mod I and AE PRN  Long Term Goal 3: Pt will perform simple IADL task in stance with LRAD for >7 minutes with supervision  Long Term Goal 4: Pt will self feed with R hand with Min A  Long Term Goal 5: Pt will increase Block and Box score on R hand by >4 blocks- goal progressing as of assessment 7/7 R hand = 1 block in 60 seconds. GOAL MET 7/14/22 Pt transferred 8 blocks in Box and Blocks assessment.       Therapy Time   Individual Concurrent Group Co-treatment   Time In 0830         Time Out 0900         Minutes 30         Timed Code Treatment Minutes: 30 Minutes    Second Session Therapy Time:   Individual Concurrent Group Co-treatment   Time In 1000        Time Out 1100        Minutes 60          Timed Code Treatment Minutes:  60 Minutes    Total Treatment Minutes:  90 minutes      Atul Lyons OT

## 2022-07-14 NOTE — PROGRESS NOTES
Speech Language Pathology  [facility]: ACUTE REHAB UNIT  SPEECH-LANGUAGE PATHOLOGY      [x] Daily  [] Weekly Care Conference Note  [] Discharge    David Gilmore      ASZ:[birth year]  [facility]:[id number]  Rehab Dx/Hx: Acute CVA (cerebrovascular accident) ([facility] [address] [I63.9]    Precautions: falls  Home situation: lives with brother, manages own finances, did not take any meds PTA, active   ST Dx: [] Aphasia  [x] Dysarthria  [] Apraxia   [] Oropharyngeal dysphagia [x] Cognitive Impairment  [] Other:   Date of Admit: 6/29/2022  Room #: 0166/0166-01    Current functional status (updated daily):         Pt being seen for : [x] Speech/Language Treatment  [] Dysphagia Treatment [x] Cognitive Treatment  [] Other:  Communication: []WFL  [] Aphasia  [x] Dysarthria  [] Apraxia  [] Pragmatic Impairment [] Non-verbal  [] Hearing Loss  [] Other:   Cognition: [] WFL  [x] Mild  [] Moderate  [] Severe [] Unable to Assess  [] Other:  Memory: [] WFL  [x] Mild  [] Moderate  [] Severe [] Unable to Assess  [] Other:  Behavior: [x] Alert  [x] Cooperative  [x]  Pleasant  [] Confused  [] Agitated  [] Uncooperative  [] Distractible [] Motivated  [] Self-Limiting [] Anxious  [] Other:  Endurance:  [x] Adequate for participation in SLP sessions  [] Reduced overall  [] Lethargic  [] Other:  Safety: [x] No concerns at this time  [] Reduced insight into deficits  []  Reduced safety awareness [] Not following call light procedures  [] Unable to Assess  [] Other:    Current Diet Order:ADULT ORAL NUTRITION SUPPLEMENT; Lunch, Dinner; Frozen Oral Supplement  ADULT ORAL NUTRITION SUPPLEMENT; Breakfast, Dinner; Standard 4 oz Oral Supplement  ADULT DIET;  Regular  Swallowing Precautions: upright for all intake, stay upright for at least 30 mins after intake, small bites/sips, set-up assistance / distant supervision with intake, alternate bites/sips, slow rate of intake, general aspiration precautions and hold PO and contact SLP if s/s of aspiration or worsening respiratory status develop. Date: 7/14/2022      Tx session 1  [id number]  Brown Beckman MA [facility] SLP  Tx session 2  [id number]  Jr Eisenberg MA [facility] SLP    Total Timed Code Min 15 10   Total Treatment Minutes 30 30   Individual Treatment Minutes 30 30   Group Treatment Minutes 0 0   Co-Treat Minutes 0 0   Variance/Reason:  0 0   Pain Denies Denies   Pain Intervention [] RN notified  [] Repositioned  [] Intervention offered and patient declined  [x] N/A  [] Other: [] RN notified  [] Repositioned  [] Intervention offered and patient declined  [x] N/A  [] Other:   Subjective     Pt alert and cooperative, agreeable to tx. Pt upright in bedside chair for session. Pt alert and oriented, cooperative and agreeable to participate in therapy. Pt seen sitting upright in bedside chair. Objective:  Goals     Dysphagia Goals:  Short-term Goals  Timeframe for Short-term Goals: 14 days (7/13/22)    Goal 1. The patient will tolerate recommended diet without observed clinical signs of aspiration. Regular Solid - Brownie  -pt with slow mastication, but adequate given increased time   -adequate A-P transit and oral clearance  -no overt s/s of aspiration    Pt with only 4 teeth; stated he is able to pick and choose what it easy for him to masticate, and will avoid food items too difficulty    Thin liquid via cup  -pt tolerated with no overt s/s of aspiration    Goal met 07/14/22. Pt is tolerating IDDSI 7 Regular, IDDSI 0 Thin, meds whole in puree as LRD. Goal met 07/14/22. Pt is tolerating IDDSI 7 Regular, IDDSI 0 Thin, meds whole in puree as LRD. Goal 2. The patient/caregiver will demonstrate understanding of compensatory strategies for improved swallowing safety. SLP edu pt re: safe swallow strategies - small bites/sips, slow rate, alternate liquids and solids, fully upright. Pt verbalized understanding. Goal met 07/14/22. Goal met 07/14/22. Goal met 07/12/22. FEES completed.  See separate twisters completed with 80% acc indep improving to 100% acc given min cues for slow rate. Pt indep implemented use of over articulation. Goal 5: The pt will effectively use compensatory speech strategies during structured speech tasks with 70% accuracy, mod cues. Goal not directly targeted. See goal 4 above. Other areas targeted: N/a N/A   Education:    SLP edu pt re: safe swallow strategies, diet recs, goals met, POC, recall strategies edu provided re: speech strategies    Safety Devices: [x] Call light within reach  [x] Chair alarm activated  [] Bed alarm activated  [] Other: [x] Call light within reach  [x] Chair alarm activated  [] Bed alarm activated  [] Other:    Assessment: Tx session 1: Pt alert and cooperative, agreeable to tx. Pt tolerated regular solids and thin liquids with no overt s/s of aspiration. Reviewed safe swallow strategies; pt verbalized understanding. Dysphagia goals met this date. Edu re: recall strategies - association, grouping/chunking, repetition. Use of these strategies improved pt's ability to recall to recall 12 items indep and following a 10 min delay. Pt did well indep answering time word problems. Continue goals above. Tx session 2: Pt pleasant and cooperative, agreeable to participate. Pt completed delayed recall of 12 items from previous speech tx session this date with 100% acc indep. Pt completed complex tongue twisters with 80% acc indep improving to 100% acc given min cues for use of slow rate, pt indep implemented use of over articulation. Pt also presented with improved self correction and use of speech strategies during conversational speech with SLP. Pt remains motivated to improve. Continue goals above. Plan: Continue as per plan of care.       Additional Information:     Barriers toward progress: None  Discharge recommendations:  [] Home independently  [x] Home with assistance []  24 hour supervision  [] ECF [] Other:  Continued Tx Upon Discharge: ? [] Yes [] No [x] TBD based on progress while on ARU [] Vital Stim indicated [] Other:   Estimated discharge date: 07/22/22    Interventions used this date:  [] Speech/Language Treatment  [] Instruction in HEP [] Group [x] Dysphagia Treatment [x] Cognitive Treatment   [] Other: Total Time Breakdown / Charges    Time in Time out Total Time / units   Cognitive Tx 1245  1400 1300  1410 15 min / 1 unit  10 min/ 1 unit    Speech Tx 1410 1430 20 min/ 1 unit    Dysphagia Tx 1230 1245 15 min / 1 unit     Electronically Signed by     Session 1:  India Del Toro MA [id number] #[id number]  Speech Language Pathologist    Session 2:   Cindy Ly. A CCC-SLP  Speech-Language Pathologist  [id number]

## 2022-07-14 NOTE — PROGRESS NOTES
Physical Therapy  [email or web address]  Rehabilitation Physical Therapy Treatment Note    NAME: Megan Mena  : 1960 (63 y.o.)  MRN: [id number]  CODE STATUS: Full Code    Date of Service: 22       Restrictions:  Restrictions/Precautions: Fall Risk;General Precautions  Position Activity Restriction  Other position/activity restrictions: R sided weakness; up with assist     SUBJECTIVE  Subjective  Subjective: pt found in recliner this afternoon. pt agreaable to therapy  Pain: none reported throughout session            OBJECTIVE  Cognition  Overall Cognitive Status: Exceptions  Arousal/Alertness: Appropriate responses to stimuli  Following Commands: Follows multistep commands consistently; Follows multistep commands with repitition  Attention Span: Attends with cues to redirect  Memory: Appears intact  Safety Judgement: Decreased awareness of need for assistance;Decreased awareness of need for safety  Problem Solving: Assistance required to generate solutions;Assistance required to identify errors made  Insights: Decreased awareness of deficits  Initiation: Requires cues for some  Sequencing: Requires cues for some  Cognition Comment: impulsive, cues to attend to R side of body  Orientation  Overall Orientation Status: Within Functional Limits  Orientation Level: Oriented to time;Oriented to situation;Oriented to person;Oriented to place    Functional Mobility     Pt found in recliner this PM. Vitals assessed. STS from recliner with SBA. Cues required to use UEs on arm rests for support. x100ft gait to gym w/o AD and SBA. Pt displays some path deviation and slight unsteadiness in gait but pt is able to actively correct. Pt also demos a hard initial contact with the ground. Pt later reports that he does not have full sensation in feet. In therapy gym, PT dons 2 lb ankle weights to pt. Pt completes x10 standing marches in place with CGA cues to go slow and lift legs high.  Pt initially Device: None (Room air)    Assessment  Assessment: pt seen this afternoon by PT in order to address deficits in functional mobility including gait, dynamic balance, strength, and motor control. pt shows instability and LOB when tasked with dynamic stepping and balance activities w/o UE support. pt is limited by sensry deficits in feet and lack of awareness of R side. pt will beneift from continued theapy in order to address these deficits.   Activity Tolerance: Patient tolerated treatment well  Discharge Recommendations: 24 hour supervision or assist;Patient would benefit from continued therapy after discharge  PT Equipment Recommendations  Equipment Needed: No    Goals  Patient Goals   Patient goals : \"to be able to walk again and use my R arm\"  Short Term Goals  Time Frame for Short term goals: 10 days (7/9/22)  Short term goal 1: Pt will perform bed mobility with min A  -7/08: goal met: SBA  Short term goal 2: Pt will perform transfers with min A  -7/08: goal met: CGA-Omero  Short term goal 3: Pt will ambulate 25' with quad cane and mod A  -7/05 Goal met  Short term goal 4: Pt will negotiate 4 steps with HR and mod A  -7/05 Goal met  Long Term Goals  Time Frame for Long term goals : 21 days (7/20/22)  Long term goal 1: Pt will perform bed mobility mod I  Long term goal 2: Pt will perform transfers with supervision  Long term goal 3: Pt will ambulate 48' with quad cane and CGA 7/05 update goal: Pt will ambulate 150' with RW and clamshell with supervision  Long term goal 4: Pt will propel wheelchair 150' mod I; 7/05 discontinue goal  Long term goal 5: Pt will negotiate 4 steps with HR and min A    PLAN OF CARE/SAFETY  Plan  Plan:  minutes of therapy at least 5 out of 7 days a week  Plan weeks: 21 days  Specific Instructions for Next Treatment: R katlyn strength training and continue mobility progression  Current Treatment Recommendations: Strengthening;ROM;Balance training;Functional mobility training;Transfer

## 2022-07-15 PROCEDURE — 97110 THERAPEUTIC EXERCISES: CPT

## 2022-07-15 PROCEDURE — 6360000002 HC RX W HCPCS: Performed by: STUDENT IN AN ORGANIZED HEALTH CARE EDUCATION/TRAINING PROGRAM

## 2022-07-15 PROCEDURE — 97032 APPL MODALITY 1+ESTIM EA 15: CPT

## 2022-07-15 PROCEDURE — 92526 ORAL FUNCTION THERAPY: CPT

## 2022-07-15 PROCEDURE — 97112 NEUROMUSCULAR REEDUCATION: CPT

## 2022-07-15 PROCEDURE — 97129 THER IVNTJ 1ST 15 MIN: CPT

## 2022-07-15 PROCEDURE — 97116 GAIT TRAINING THERAPY: CPT

## 2022-07-15 PROCEDURE — 1280000000 HC REHAB R&B

## 2022-07-15 PROCEDURE — 92507 TX SP LANG VOICE COMM INDIV: CPT

## 2022-07-15 PROCEDURE — 97535 SELF CARE MNGMENT TRAINING: CPT

## 2022-07-15 PROCEDURE — 6370000000 HC RX 637 (ALT 250 FOR IP): Performed by: STUDENT IN AN ORGANIZED HEALTH CARE EDUCATION/TRAINING PROGRAM

## 2022-07-15 RX ADMIN — TRAZODONE HYDROCHLORIDE 50 MG: 50 TABLET ORAL at 21:06

## 2022-07-15 RX ADMIN — POLYVINYL ALCOHOL 2 DROP: 14 SOLUTION/ DROPS OPHTHALMIC at 07:35

## 2022-07-15 RX ADMIN — ACETAMINOPHEN 650 MG: 325 TABLET ORAL at 07:35

## 2022-07-15 RX ADMIN — ATORVASTATIN CALCIUM 10 MG: 10 TABLET, FILM COATED ORAL at 21:06

## 2022-07-15 RX ADMIN — ASPIRIN 81 MG: 81 TABLET, CHEWABLE ORAL at 07:35

## 2022-07-15 RX ADMIN — ENOXAPARIN SODIUM 40 MG: 100 INJECTION SUBCUTANEOUS at 07:35

## 2022-07-15 RX ADMIN — POLYVINYL ALCOHOL 2 DROP: 14 SOLUTION/ DROPS OPHTHALMIC at 21:06

## 2022-07-15 ASSESSMENT — PAIN DESCRIPTION - ORIENTATION: ORIENTATION: RIGHT

## 2022-07-15 ASSESSMENT — PAIN DESCRIPTION - LOCATION: LOCATION: SHOULDER

## 2022-07-15 ASSESSMENT — PAIN SCALES - GENERAL
PAINLEVEL_OUTOF10: 0
PAINLEVEL_OUTOF10: 5

## 2022-07-15 ASSESSMENT — PAIN DESCRIPTION - DESCRIPTORS: DESCRIPTORS: ACHING;SORE

## 2022-07-15 NOTE — PROGRESS NOTES
Speech Language Pathology  [facility]: ACUTE REHAB UNIT  SPEECH-LANGUAGE PATHOLOGY      [x] Daily  [] Weekly Care Conference Note  [] Discharge    Austin Jalloh      LWY:[birth year]  LEB:[id number]  Rehab Dx/Hx: Acute CVA (cerebrovascular accident) ([facility] [address] [I63.9]    Precautions: falls  Home situation: lives with brother, manages own finances, did not take any meds PTA, active   ST Dx: [] Aphasia  [x] Dysarthria  [] Apraxia   [] Oropharyngeal dysphagia [x] Cognitive Impairment  [] Other:   Date of Admit: 6/29/2022  Room #: 0166/0166-01    Current functional status (updated daily):         Pt being seen for : [x] Speech/Language Treatment  [] Dysphagia Treatment [x] Cognitive Treatment  [] Other:  Communication: []WFL  [] Aphasia  [x] Dysarthria  [] Apraxia  [] Pragmatic Impairment [] Non-verbal  [] Hearing Loss  [] Other:   Cognition: [] WFL  [x] Mild  [] Moderate  [] Severe [] Unable to Assess  [] Other:  Memory: [] WFL  [x] Mild  [] Moderate  [] Severe [] Unable to Assess  [] Other:  Behavior: [x] Alert  [x] Cooperative  [x]  Pleasant  [] Confused  [] Agitated  [] Uncooperative  [] Distractible [] Motivated  [] Self-Limiting [] Anxious  [] Other:  Endurance:  [x] Adequate for participation in SLP sessions  [] Reduced overall  [] Lethargic  [] Other:  Safety: [x] No concerns at this time  [] Reduced insight into deficits  []  Reduced safety awareness [] Not following call light procedures  [] Unable to Assess  [] Other:    Current Diet Order:ADULT ORAL NUTRITION SUPPLEMENT; Lunch, Dinner; Frozen Oral Supplement  ADULT ORAL NUTRITION SUPPLEMENT; Breakfast, Dinner; Standard 4 oz Oral Supplement  ADULT DIET;  Regular  Swallowing Precautions: upright for all intake, stay upright for at least 30 mins after intake, small bites/sips, set-up assistance / distant supervision with intake, alternate bites/sips, slow rate of intake, general aspiration precautions and hold PO and contact SLP if s/s of aspiration or worsening respiratory status develop. Date: 7/15/2022      Tx session 1  [id number] Tx session 2  [id number]   Total Timed Code Min 0 20   Total Treatment Minutes 30 30   Individual Treatment Minutes 30 30   Group Treatment Minutes 0 0   Co-Treat Minutes 0 0   Variance/Reason:  0 0   Pain Denies Denies   Pain Intervention [] RN notified  [] Repositioned  [] Intervention offered and patient declined  [x] N/A  [] Other: [] RN notified  [] Repositioned  [] Intervention offered and patient declined  [x] N/A  [] Other:   Subjective     Pt alert and oriented, cooperative and agreeable to participate in therapy. Pt seen sitting upright in bedside chair. Pt alert and oriented, cooperative and agreeable to participate in therapy. Pt seen sitting upright in bedside chair. Objective:  Goals     Dysphagia Goals:   Goal met 07/14/22. Pt is tolerating IDDSI 7 Regular, IDDSI 0 Thin, meds whole in puree as LRD. Goal met 07/14/22. Pt is tolerating IDDSI 7 Regular, IDDSI 0 Thin, meds whole in puree as LRD. Goal met 07/14/22. Goal met 07/14/22. Goal met 07/12/22. FEES completed. See separate speech therapy note for details. Goal met 07/12/22. FEES completed. See separate speech therapy note for details. Goal met 07/14/22. Pt has demonstrated overall improvement with laryngeal / pharyngeal strength; pt is tolerating regular solids and thin liquids. Goal met 07/14/22. *New goal added 07/01/22*  Goal 5. The pt will complete OME's for improved oral motor function, coordination, strength, and ROM in 10/10 trials, min cues. OME's completed with vital stim (NMES) in place:  -placement 4a at 8.0mA for 20 mins   -pt tolerated without any adverse reactions or side effects  -smile: x30  -pucker: x30  -smile/pucker: x30  -cheek puff hold for 3 secs: x30  -cheek/eye squint: x30  -open wide: x30  -pucker to R side: x30   Did not target.     Speech/Lang/Cog goals:  Short-term Goals  Timeframe for Short-term *goals extended through 07/22/22*    Goal 1: The pt will complete graded recall tasks with 90% accuracy, min-no cues. Did not target. Functional recall of 16 items:  -100% acc indep    Short term recall of the same 16 items after a 15 min delay:  -100% acc indep   Goal 2: The pt will complete executive function tasks (i.e. planning, deductive reasoning, inferential, functional organization tasks) with 90% accuracy no cues. Did not target. Did not target. Goal 3: The pt will complete high-level problem-solving tasks (*particularly related to safety scenarios) with 95% accuracy, no cues. Did not target. Pt talking about driving as soon as her gets home. Extensive education provided re pt following up with his PCP and completing a 's evaluation and obtaining clearance prior to driving. Pt states \"I have a [place] license plate no one will ever know its me\"     Reduced insight into deficits. Goal 4: The pt will complete articulatory agility tasks with 70% accuracy, mod cues. Tongue twisters:  -80% acc indep improving to 100% acc given min cues  Tongue twisters:   -90% acc indep improving to 100% acc given min cues    Goal 5: The pt will effectively use compensatory speech strategies during structured speech tasks with 70% accuracy, mod cues. See goal 4 above. See goal 4 above. Other areas targeted: N/a N/A   Education:    Edu provided re: speech strategies Edu provided re: speech strategies, safety awareness    Safety Devices: [x] Call light within reach  [x] Chair alarm activated  [] Bed alarm activated  [] Other: [x] Call light within reach  [x] Chair alarm activated  [] Bed alarm activated  [] Other:    Assessment: Tx session 1: Pt pleasant and cooperative, agreeable to participate. Pt completed tongue twisters with 80% acc indep improving to 100% acc given min cues.  Pt tolerated vital stim (NMES) placement 4a at 8.0mA for 20 mins without any adverse reactions or side effects, while completing OME's. Tx session 2: Pt pleasant and cooperative, agreeable to participate. Pt with improved completion of tongue twisters this afternoon, less cueing required for speech strategies, completing with 90% acc indep. Consistent carryover of slow rate and over articulation. Pt completed functional recall and short term recall of 16 words with 100% acc indep. Pt presenting with reduced insight into deficits today, stating he was going to drive as soon as he got home, despite education provided regarding safety/drivers evaluation. Continue goals above. Plan: Continue as per plan of care. Additional Information:     Barriers toward progress: None  Discharge recommendations:  [] Home independently  [x] Home with assistance []  24 hour supervision  [] ECF [] Other:  Continued Tx Upon Discharge: ? [] Yes [] No [x] TBD based on progress while on ARU [] Vital Stim indicated [] Other:   Estimated discharge date: 07/22/22    Interventions used this date:  [] Speech/Language Treatment  [] Instruction in HEP [] Group [x] Dysphagia Treatment [x] Cognitive Treatment   [] Other: Total Time Breakdown / Charges    Time in Time out Total Time / units   Cognitive Tx 1330 1350 20 min/ 1 unit    Speech Tx 0920  1350 0930  1400 10 min/ 1 unit   10 min/ 1 unit    Dysphagia Tx 0900 0920 20 min/ 1 unit      Electronically Signed by     Benjamin Ramirez. A CCC-SLP  Speech-Language Pathologist  [id number]

## 2022-07-15 NOTE — PROGRESS NOTES
Ferdinand Medley  7/15/2022  [id number]    Chief Complaint: Acute CVA (cerebrovascular accident) [facility])    Subjective:   No acute events overnight. Today Jenna Howard is seen in his room with therapy present. He reports that he is feeling well. He denies acute complaints at this time. ROS: denies f/c, n/v, cp, sob    Objective:  Patient Vitals for the past 24 hrs:   BP Temp Temp src Pulse Resp SpO2   07/15/22 1002 103/76 -- -- 88 -- 99 %   07/15/22 0730 112/79 98.2 °F (36.8 °C) Oral 82 16 96 %   07/14/22 2100 127/81 97.4 °F (36.3 °C) Oral 86 18 98 %   07/14/22 1419 111/75 -- -- 84 -- 98 %     Gen: No distress, pleasant. HEENT: Normocephalic, atraumatic. CV: RRR  Resp: Lungs CTAB  Abd: Soft, nontender   Ext: No edema. Neuro: Alert, oriented, appropriately interactive. Wt Readings from Last 3 Encounters:   06/29/22 145 lb 15.1 oz (66.2 kg)   02/14/22 145 lb (65.8 kg)       Laboratory data:   Lab Results   Component Value Date    WBC 4.6 07/14/2022    HGB 14.7 07/14/2022    HCT 41.9 07/14/2022    .2 (H) 07/14/2022     07/14/2022       Lab Results   Component Value Date/Time     07/14/2022 07:21 AM    K 4.3 07/14/2022 07:21 AM     07/14/2022 07:21 AM    CO2 27 07/14/2022 07:21 AM    BUN 15 07/14/2022 07:21 AM    CREATININE 0.7 07/14/2022 07:21 AM    GLUCOSE 115 07/14/2022 07:21 AM    CALCIUM 10.3 07/14/2022 07:21 AM        Therapy progress:  PT  Position Activity Restriction  Other position/activity restrictions: R sided weakness; up with assist  Objective     Sit to Stand:  Moderate Assistance  Stand to sit: Moderate Assistance  Bed to Chair: Moderate assistance (to L with cues for safety)  Device: Parallel Bars  Assistance: Minimal assistance  Distance: 8' and 20'  OT  PT Equipment Recommendations  Equipment Needed: No  Toilet - Technique: Ambulating  Equipment Used: Standard toilet (with L grab bar)  Toilet Transfers Comments: cues for safe hand placement; decreased proprioception when going to initate sit down to toilet   Assessment        SLP  Current Diet : Soft and Bite-Sized             Body mass index is 18.74 kg/m². Assessment and Plan:  Koby Hernandez is a 60year old male with a past medical history significant for left sided stroke due to ICA dissection (2018) without residual deficits, HLD, and nicotine dependence who presented to  on 6/26/22 with acute onset of right face, arm, and leg weakness, found to have acute CVA. He was admitted to [facility] on 6/29/22 due to functional deficits below his baseline. 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 Follow bowel movements. Enema or suppository if needed. Bladder: Check PVR x 3. [address] if PVR > 200ml or if any volume is > 500 ml. Sleep: Trazodone provided prn. Follow up appointments: PCP, Stroke clinic    Services: home health versus outpatient PT, OT ST  EDOD: 7/22/22    Madison Salas.  Dwight Gonzalez MD 7/15/2022, 1:00 PM

## 2022-07-15 NOTE — PROGRESS NOTES
Physical Therapy  Facility/Department: [facility]  Rehabilitation Physical Therapy Treatment Note    NAME: Nyasia Alvarado  : 1960 (63 y.o.)  MRN: [id number]  CODE STATUS: Full Code    Date of Service: 7/15/22       Restrictions:  Restrictions/Precautions: Fall Risk;General Precautions  Position Activity Restriction  Other position/activity restrictions: R sided weakness; up with assist     SUBJECTIVE  Subjective  Subjective: pt found sitting EOB this morning. Pain: pt reports general aches today            OBJECTIVE  Cognition  Arousal/Alertness: Appropriate responses to stimuli  Following Commands: Follows multistep commands consistently; Follows multistep commands with repitition  Attention Span: Attends with cues to redirect  Memory: Appears intact  Safety Judgement: Decreased awareness of need for assistance;Decreased awareness of need for safety  Problem Solving: Assistance required to generate solutions;Assistance required to identify errors made  Insights: Decreased awareness of deficits  Initiation: Requires cues for some  Sequencing: Requires cues for some  Cognition Comment: impulsive, cues to attend to R side of body  Orientation  Overall Orientation Status: Within Functional Limits  Orientation Level: Oriented to time;Oriented to situation;Oriented to person;Oriented to place    Functional Mobility     Pt found sitting EOB. Vitals assessed. STS from EOB with SBA. Pt states feeling stiff this morning and being quite tired. X300ft+ 200ft gait around ARU. Pt demos limping gait with reduced stance time on R. Pt indicates that he can feel his R knee hyperextending in stance. PT promotes seated rest break and dons ace wrap bracing to R knee. Pt immediately shows improved gait with symmetrical stepping and states that his knee feels better. Pt dons 2lb ankle weights to Les  Pt completes x7 box step in each direction with CGA. Fwd->side step>bwd>side step>repeat.  Pt initially shows difficulty coordinating steps and experiences small LOB but is able to improve with repetition and recover balance independently. Pt completes x2 min of marching with ankle weights and 1lb dummbells in hands with CGA. Pt cued to use excessive arm swing and marching motion in coordination. Pt has difficulty moving contralateral arm and leg simultaneously , especially R UE. Min A required to mobilize R UE. Dyn circuit exercise w/ CGA: sitting at low mat>stand w/ 1lb dumbbells>side step x3> OH press w/ dumbbells> side step back> sit> repeat. Pt completes x8 of the above circuit. Pt requires min-Mod A to maintain  on R UE and to raise above head. Pt voices fatigue after exercise. Standing 3ft from wall, pt cued to stand upright and toss ball at wall and catch the rebound with CGA. Pt completes x2 min. Pt has significant difficulty tossing and catching ball with R UE but is able to compensate with L UE. X12 stairs ascend/descend with CGA and BHR support. Pt utilizes step to pattern with stair mobility. Pt demos deficit in proprioception as he has difficulty correctly placing R LE at times. X10 tap ups onto 6 in step with each LE in an alternating pattern. Pt cued to not utilize arm support and focus on control of LE. Exercise is progressed to x5 step up onto stair and swing opposite LE to next 6in step. Pt initially shows good control and step height but at he fatigues begin to drag the R LE and struggles to get it fully on step. Ankle weights doffed  X400ft gait back to room with SBA. Pt cued to scan environment and avoid objects on R side. Pt left in recliner with all needs within reach.    ASSESSMENT/PROGRESS TOWARDS GOALS  Vital Signs  Heart Rate: 88  Heart Rate Source: Monitor  BP: 103/76  BP Location: Right upper arm  MAP (Calculated): 85  SpO2: 99 %  O2 Device: None (Room air)    Assessment  Assessment: pt seen this mornng by PT in order to address deficits in functional mobility, strength, endurance, and motor control. pt continues to be limited by R sided deficits and awareness along with dynamic balance deficits. pt voices discomfort in R knee at initiation of therapy and demos a limping gait. PT donns ace wrap to R knee and pt immediately improves gait mechanics. pt will continue to benefit from skilled therapy in order to address deficits. PT rec DC home with HHPT vs OPPT. Activity Tolerance: Patient tolerated treatment well  Discharge Recommendations: 24 hour supervision or assist;Patient would benefit from continued therapy after discharge  PT Equipment Recommendations  Equipment Needed: No    Goals  Patient Goals   Patient goals : \"to be able to walk again and use my R arm\"  Short Term Goals  Time Frame for Short term goals: 10 days (7/9/22)  Short term goal 1: Pt will perform bed mobility with min A  -7/08: goal met: SBA  Short term goal 2: Pt will perform transfers with min A  -7/08: goal met: CGA-Omero  Short term goal 3: Pt will ambulate 25' with quad cane and mod A  -7/05 Goal met  Short term goal 4: Pt will negotiate 4 steps with HR and mod A  -7/05 Goal met  Long Term Goals  Time Frame for Long term goals : 21 days (7/20/22)  Long term goal 1: Pt will perform bed mobility mod I  Long term goal 2: Pt will perform transfers with supervision  Long term goal 3: Pt will ambulate 48' with quad cane and CGA 7/05 update goal: Pt will ambulate 150' with RW and clamshell with supervision  Long term goal 4: Pt will propel wheelchair 150' mod I; 7/05 discontinue goal  Long term goal 5: Pt will negotiate 4 steps with HR and min A    PLAN OF CARE/SAFETY  Plan  Plan:  minutes of therapy at least 5 out of 7 days a week  Plan weeks: 21 days  Specific Instructions for Next Treatment: R katlyn strength training and continue mobility progression  Current Treatment Recommendations: Strengthening;ROM;Balance training;Functional mobility training;Transfer training; Endurance training; Wheelchair mobility training;Gait training;Stair training;Neuromuscular re-education;Manual Therapy - Soft Tissue Mobilization;Patient/Caregiver education & training; Safety education & training;Home exercise program;Equipment evaluation, education, & procurement; Modalities; Therapeutic activities;ADL/Self-care training;IADL training  Safety Devices  Type of Devices: All fall risk precautions in place;Call light within reach; Chair alarm in place;Gait belt;Nurse notified; Left in chair;Patient at risk for falls  Restraints  Restraints Initially in Place: No    EDUCATION  Education  Education Given To: Patient  Education Provided: Role of Therapy; Safety;Equipment; Mobility Training; Fall Prevention Strategies;Transfer Training;Plan of Care;Precautions; Home Exercise Program  Education Provided Comments: educated pt on fall prevention strategies, no carryover  Education Method: Demonstration;Verbal  Barriers to Learning: Cognition  Education Outcome: Continued education needed        Therapy Time   Individual Concurrent Group Co-treatment   Time In 0800         Time Out 0900         Minutes 60           Timed Code Treatment Minutes: [id number] [facility], [address] [place] [address], 07/15/22 at 10:09 AM

## 2022-07-15 NOTE — PROGRESS NOTES
Occupational Therapy  Facility/Department: [facility]  Rehabilitation Occupational Therapy Daily Treatment Note    Date: 7/15/22  Patient Name: Kaur Vasquez       Room: 1575/5187-38  MRN: [id number]  Account: [de-identified]   : 1960  (63 y.o.) Gender: male                    Past Medical History:  has a past medical history of CVA (cerebral vascular accident) ([address] Past Surgical History:   has no past surgical history on file. Restrictions  Restrictions/Precautions: Fall Risk;General Precautions  Other position/activity restrictions: R sided weakness; up with assist    Subjective  Subjective: Pt in recliner, pleasant, tired today, agreeable to OT session, /70, HR 86, O2 sats 95% on RA. Restrictions/Precautions: Fall Risk;General Precautions             Objective     Cognition  Overall Cognitive Status: Exceptions  Arousal/Alertness: Appropriate responses to stimuli  Following Commands: Follows multistep commands consistently; Follows multistep commands with repitition  Attention Span: Attends with cues to redirect  Memory: Appears intact  Safety Judgement: Decreased awareness of need for safety  Problem Solving: Assistance required to generate solutions;Assistance required to identify errors made  Insights: Decreased awareness of deficits  Initiation: Does not require cues  Sequencing: Requires cues for some  Orientation  Overall Orientation Status: Within Functional Limits   Perception  Overall Perceptual Status: Impaired  Unilateral Attention: Cues to attend to right side of body;Cues to attend right visual field  Initiation: Appears intact  Motor Planning: Appears intact  Perseveration: Not present     ADL  Feeding  Assistance Level: Set-up  Skilled Clinical Factors: Pt able to open salt/pepper packages but still requires assist to open drink containers and remove lids    Instrumental ADL's  Instrumental ADLs: Yes  Meal Prep  Meal Prep Level: Other  Meal Prep Level of Assistance:  Moderate assistance  Meal Preparation: assist for scooping and mixing due to difficulty with RUE, SBA for balance in kitchen, good safety with hot surfaces; fair sequencing  Light Housekeeping  Light Housekeeping Level: Other  Light Housekeeping Level of Assistance: Supervision  Light Housekeeping: standing at sink to wash dishes, fair use of RUE to grasp objects while LUE washed objects     Functional Mobility  Device:  (no AD)  Activity: To/From therapy gym;Retrieve items;Transport items  Assistance Level: Stand by assist;Contact guard assist  Skilled Clinical Factors: CGA at times with 1-2 LOB, SBA/SPV for majority of tasks  Transfers  Surface: To chair with arms;From chair with arms  Additional Factors: Set-up; Verbal cues; Increased time to complete;Hand placement cues  Device: Walker  Sit to Stand  Assistance Level: Supervision  Stand to Sit  Assistance Level: Supervision  Bed To/From Chair  Technique: Stand step  Assistance Level: Supervision   OT Exercises  Resistive Exercises: 2# dowel rossi x20 for elbow flexion, wrist flexion, wrist extension, shoulder flexion, shoulder horizontal abduction, chest press, forward rows, and backward rows with BUEs, squeezing 5# hand gym x20 RUE     Assessment  Assessment  Assessment: Pt agreeable to OT session. Pt performed functional transfers and mobility with CGA/SBA this date with good standing tolerance to stand for 6.5 minutes for NMR task and 18 minutes for kitchen task. Pt exhibited one LOB to R when stopping and turning to L, requiring min A to correct and min VCs throughout session for attention to R side but no other LOB noted. Pt demonstrated improved strength in RUE with ability to grasp 8/10 Jenga blocks without assist of E-stim and no assist to raise hand to shelf at chest/shoulder height. Pt demonstrated improved R thumb movement this date and grasping better but still with difficulty opening hand and wrist movement to grasp items.  Pt demonstrated good sequencing in kitchen but still requires mod A for cooking task due to poor use of RUE to open package, stir ingredients, scoop onto pan, and remove pan from oven. Continue POC. Activity Tolerance: Patient tolerated treatment well  Discharge Recommendations: 24 hour supervision or assist;Outpatient OT  OT Equipment Recommendations  Equipment Needed: Yes  Mobility Devices: ADL Assistive Devices  ADL Assistive Devices: Reacher;Sock-Aid Hard;Elastic Shoe Laces;Grab Bars - tub;Transfer Tub Bench  Safety Devices  Safety Devices in place: Yes  Type of devices: Call light within reach; All fall risk precautions in place; Patient at risk for falls;Gait belt;Nurse notified; Left in bed;Bed alarm in place    Patient Education  Education  Education Given To: Patient  Education Provided: Role of Therapy; ADL Function;Equipment; Mobility Training;Transfer Training;Energy Conservation; Safety;Precautions;Plan of Care;DME/Home Modifications; Home Exercise Program;Visual Perceptual Function; Fall Prevention Strategies;IADL Function  Education Provided Comments: disease specific: ADL katlyn techniques, dynamic vs standing balance, safety with foot placement and clearance, NMR with e-stim, visual scanning, R sided awareness, safety in kitchen  Education Method: Verbal;Demonstration  Barriers to Learning: Cognition  Education Outcome: Verbalized understanding;Continued education needed    Plan  Plan  Times per Week: 5 of 7 days  Current Treatment Recommendations: Strengthening;Balance training;Functional mobility training; Endurance training;Neuromuscular re-education;Positioning;Equipment evaluation, education, & procurement; Modalities; Patient/Caregiver education & training; Safety education & training;Self-Care / ADL; Home management training;Cognitive/Perceptual training;Coordination training;Sensory integraion    Goals  Short Term Goals  Time Frame for Short term goals: within 7 days by 7/6/22  Short Term Goal 1: Pt will perform toilet transfer with Mod A and LRAD- GOAL MET 7/11/22 Pt completed toilet transfer with CGA. Short Term Goal 2: Pt will perform LB dressing with Min A and AE PRN- GOAL MET 7/13/22 Pt performed LB dressing with min A. Short Term Goal 3: Pt will perform UB dressing with Min A and appropriate katlyn techniques- GOAL MET 7/7  Short Term Goal 4: Pt will perform functional dynamic balance activity in stance for >5 minutes with Min A and LRAD- GOAL MET 7/6  Short Term Goal 5: Pt will perform 2-3 grooming tasks with Min A for during tasks standing vs sitting with LRAD and Min A for balance. GOAL MET 7/11/22 Pt completed grooming tasks with min A. Long Term Goals  Time Frame for Long term goals : within 21 days by 7/20/22  Long Term Goal 1: Pt will perform TB dressing Mod I  Long Term Goal 2: Pt will perfrom TB bathing Mod I and AE PRN  Long Term Goal 3: Pt will perform simple IADL task in stance with LRAD for >7 minutes with supervision. GOAL MET 7/15/22 Pt completed IADL task with supervision and stood greater than 7 minutes. Long Term Goal 4: Pt will self feed with R hand with Min A  Long Term Goal 5: Pt will increase Block and Box score on R hand by >4 blocks- goal progressing as of assessment 7/7 R hand = 1 block in 60 seconds. GOAL MET 7/14/22 Pt transferred 8 blocks in Box and Blocks assessment.       Therapy Time   Individual Concurrent Group Co-treatment   Time In 1000         Time Out 1130         Minutes 90         Timed Code Treatment Minutes: Louis Suarezon Billy

## 2022-07-16 PROCEDURE — 1280000000 HC REHAB R&B

## 2022-07-16 PROCEDURE — 6370000000 HC RX 637 (ALT 250 FOR IP): Performed by: STUDENT IN AN ORGANIZED HEALTH CARE EDUCATION/TRAINING PROGRAM

## 2022-07-16 PROCEDURE — 6360000002 HC RX W HCPCS: Performed by: STUDENT IN AN ORGANIZED HEALTH CARE EDUCATION/TRAINING PROGRAM

## 2022-07-16 RX ADMIN — ASPIRIN 81 MG: 81 TABLET, CHEWABLE ORAL at 09:37

## 2022-07-16 RX ADMIN — ENOXAPARIN SODIUM 40 MG: 100 INJECTION SUBCUTANEOUS at 09:38

## 2022-07-16 RX ADMIN — POLYVINYL ALCOHOL 2 DROP: 14 SOLUTION/ DROPS OPHTHALMIC at 09:40

## 2022-07-16 RX ADMIN — POLYVINYL ALCOHOL 2 DROP: 14 SOLUTION/ DROPS OPHTHALMIC at 20:47

## 2022-07-16 RX ADMIN — TRAZODONE HYDROCHLORIDE 50 MG: 50 TABLET ORAL at 20:48

## 2022-07-16 RX ADMIN — ATORVASTATIN CALCIUM 10 MG: 10 TABLET, FILM COATED ORAL at 20:47

## 2022-07-16 ASSESSMENT — PAIN SCALES - GENERAL: PAINLEVEL_OUTOF10: 0

## 2022-07-16 NOTE — PROGRESS NOTES
Aayush Home  7/16/2022  [id number]    Chief Complaint: Acute CVA (cerebrovascular accident) [facility])    Subjective:   No acute events overnight. Today Cricket Dudley is seen in his room. He reports pain in his right lateral foot due to callus. Area appears to be small callous that is not open without any erythema. He denies other acute complaints at this time. ROS: denies f/c, n/v, cp, sob    Objective:  Patient Vitals for the past 24 hrs:   BP Temp Temp src Pulse Resp SpO2   07/16/22 0800 104/70 97.4 °F (36.3 °C) Axillary 88 16 97 %   07/15/22 2100 121/72 98 °F (36.7 °C) Oral 78 16 97 %     Gen: No distress, pleasant. HEENT: Normocephalic, atraumatic. CV: extremities well perfused  Resp: no respiratory distress  Abd: Soft, nontender   Ext: No edema. Neuro: Alert, oriented, appropriately interactive. Right hemiparesis    Wt Readings from Last 3 Encounters:   06/29/22 145 lb 15.1 oz (66.2 kg)   02/14/22 145 lb (65.8 kg)       Laboratory data:   Lab Results   Component Value Date    WBC 4.6 07/14/2022    HGB 14.7 07/14/2022    HCT 41.9 07/14/2022    .2 (H) 07/14/2022     07/14/2022       Lab Results   Component Value Date/Time     07/14/2022 07:21 AM    K 4.3 07/14/2022 07:21 AM     07/14/2022 07:21 AM    CO2 27 07/14/2022 07:21 AM    BUN 15 07/14/2022 07:21 AM    CREATININE 0.7 07/14/2022 07:21 AM    GLUCOSE 115 07/14/2022 07:21 AM    CALCIUM 10.3 07/14/2022 07:21 AM        Therapy progress:  PT  Position Activity Restriction  Other position/activity restrictions: R sided weakness; up with assist  Objective     Sit to Stand:  Moderate Assistance  Stand to sit: Moderate Assistance  Bed to Chair: Moderate assistance (to L with cues for safety)  Device: Parallel Bars  Assistance: Minimal assistance  Distance: 8' and 20'  OT  PT Equipment Recommendations  Equipment Needed: No  Toilet - Technique: Ambulating  Equipment Used: Standard toilet (with L grab bar)  Toilet Transfers Comments: cues for safe hand placement; decreased proprioception when going to initate sit down to toilet   Assessment        SLP  Current Diet : Soft and Bite-Sized             Body mass index is 18.74 kg/m². Assessment and Plan:  Sheryle Kell is a 60year old male with a past medical history significant for left sided stroke due to ICA dissection (2018) without residual deficits, HLD, and nicotine dependence who presented to  on 6/26/22 with acute onset of right face, arm, and leg weakness, found to have acute CVA. He was admitted to [facility] on 6/29/22 due to functional deficits below his baseline. 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 Follow bowel movements. Enema or suppository if needed. Bladder: Check PVR x 3. [facility] if PVR > 200ml or if any volume is > 500 ml. Sleep: Trazodone provided prn. Follow up appointments: PCP, Stroke clinic    Services: home health versus outpatient PT, OT, ST  EDOD: 7/22/22    [address] [place] [address].  Doris Graham MD 7/16/2022, 2:22 PM

## 2022-07-17 PROCEDURE — 6370000000 HC RX 637 (ALT 250 FOR IP): Performed by: STUDENT IN AN ORGANIZED HEALTH CARE EDUCATION/TRAINING PROGRAM

## 2022-07-17 PROCEDURE — 6360000002 HC RX W HCPCS: Performed by: STUDENT IN AN ORGANIZED HEALTH CARE EDUCATION/TRAINING PROGRAM

## 2022-07-17 PROCEDURE — 1280000000 HC REHAB R&B

## 2022-07-17 RX ADMIN — ASPIRIN 81 MG: 81 TABLET, CHEWABLE ORAL at 09:40

## 2022-07-17 RX ADMIN — POLYVINYL ALCOHOL 2 DROP: 14 SOLUTION/ DROPS OPHTHALMIC at 09:40

## 2022-07-17 RX ADMIN — ATORVASTATIN CALCIUM 10 MG: 10 TABLET, FILM COATED ORAL at 20:03

## 2022-07-17 RX ADMIN — TRAZODONE HYDROCHLORIDE 50 MG: 50 TABLET ORAL at 20:03

## 2022-07-17 RX ADMIN — ENOXAPARIN SODIUM 40 MG: 100 INJECTION SUBCUTANEOUS at 09:40

## 2022-07-17 ASSESSMENT — PAIN SCALES - GENERAL: PAINLEVEL_OUTOF10: 0

## 2022-07-17 NOTE — PLAN OF CARE
Problem: Safety - Adult  Goal: Free from fall injury  7/17/2022 1554 by Johnny Worrell, RN  Note: Instructed pt to use call light as needed with ambulation. Bed locked and in lowest position. Non-skid socks in place. Call light in reach. Problem: Skin/Tissue Integrity  Goal: Absence of new skin breakdown  Description:   Monitor for areas of redness and/or skin breakdown  Pt's buttocks red and blanchable. Encouraged pt to up and oob and to reposition self.     Outcome: Progressing     Problem: Nutrition Deficit:  Goal: Optimize nutritional status  Outcome: Progressing

## 2022-07-17 NOTE — PROGRESS NOTES
Rene Lake  7/17/2022  [id number]    Chief Complaint: Acute CVA (cerebrovascular accident) [facility])    Subjective:   No acute events overnight. Today Lainey Pabon is seen in his room eating lunch. He reports that he is feeling well and denies acute complaints. ROS: denies f/c, n/v, cp, sob    Objective:  Patient Vitals for the past 24 hrs:   BP Temp Temp src Pulse Resp SpO2 Weight   07/17/22 0745 101/70 97.8 °F (36.6 °C) Oral 82 16 95 % --   07/16/22 2047 114/72 97.9 °F (36.6 °C) Oral 86 18 95 % 146 lb 5.7 oz (66.4 kg)     Gen: No distress, pleasant. HEENT: Normocephalic, atraumatic. CV: extremities well perfused  Resp: no respiratory distress  Abd: Soft, nontender   Ext: No edema. Neuro: Alert, oriented, appropriately interactive. Right hemiparesis, seated on edge of bed without LOB    Wt Readings from Last 3 Encounters:   07/16/22 146 lb 5.7 oz (66.4 kg)   02/14/22 145 lb (65.8 kg)       Laboratory data:   Lab Results   Component Value Date    WBC 4.6 07/14/2022    HGB 14.7 07/14/2022    HCT 41.9 07/14/2022    .2 (H) 07/14/2022     07/14/2022       Lab Results   Component Value Date/Time     07/14/2022 07:21 AM    K 4.3 07/14/2022 07:21 AM     07/14/2022 07:21 AM    CO2 27 07/14/2022 07:21 AM    BUN 15 07/14/2022 07:21 AM    CREATININE 0.7 07/14/2022 07:21 AM    GLUCOSE 115 07/14/2022 07:21 AM    CALCIUM 10.3 07/14/2022 07:21 AM        Therapy progress:  PT  Position Activity Restriction  Other position/activity restrictions: R sided weakness; up with assist  Objective     Sit to Stand:  Moderate Assistance  Stand to sit: Moderate Assistance  Bed to Chair: Moderate assistance (to L with cues for safety)  Device: Parallel Bars  Assistance: Minimal assistance  Distance: 8' and 20'  OT  PT Equipment Recommendations  Equipment Needed: No  Toilet - Technique: Ambulating  Equipment Used: Standard toilet (with L grab bar)  Toilet Transfers Comments: cues for safe hand placement; decreased proprioception when going to initate sit down to toilet   Assessment        SLP  Current Diet : Soft and Bite-Sized             Body mass index is 18.79 kg/m². Assessment and Plan:  Gualberto Cole is a 60year old male with a past medical history significant for left sided stroke due to ICA dissection (2018) without residual deficits, HLD, and nicotine dependence who presented to  on 6/26/22 with acute onset of right face, arm, and leg weakness, found to have acute CVA. He was admitted to [facility] on 6/29/22 due to functional deficits below his baseline. 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 Follow bowel movements. Enema or suppository if needed. Bladder: Check PVR x 3. [address] if PVR > 200ml or if any volume is > 500 ml. Sleep: Trazodone provided prn. Follow up appointments: PCP, Stroke clinic    Services: home health versus outpatient PT, OT, ST  EDOD: 7/22/22    Kathrin Dumas.  You Valle MD 7/17/2022, 2:52 PM

## 2022-07-18 LAB
ANION GAP SERPL CALCULATED.3IONS-SCNC: 11 MMOL/L (ref 3–16)
BASOPHILS ABSOLUTE: 0.1 K/UL (ref 0–0.2)
BASOPHILS RELATIVE PERCENT: 0.9 %
BUN BLDV-MCNC: 14 MG/DL (ref 7–20)
CALCIUM SERPL-MCNC: 10.6 MG/DL (ref 8.3–10.6)
CHLORIDE BLD-SCNC: 97 MMOL/L (ref 99–110)
CO2: 28 MMOL/L (ref 21–32)
CREAT SERPL-MCNC: 0.8 MG/DL (ref 0.8–1.3)
EOSINOPHILS ABSOLUTE: 0.1 K/UL (ref 0–0.6)
EOSINOPHILS RELATIVE PERCENT: 1.7 %
GFR AFRICAN AMERICAN: >60
GFR NON-AFRICAN AMERICAN: >60
GLUCOSE BLD-MCNC: 164 MG/DL (ref 70–99)
HCT VFR BLD CALC: 45.5 % (ref 40.5–52.5)
HEMOGLOBIN: 15.4 G/DL (ref 13.5–17.5)
LYMPHOCYTES ABSOLUTE: 1.5 K/UL (ref 1–5.1)
LYMPHOCYTES RELATIVE PERCENT: 26.8 %
MCH RBC QN AUTO: 36 PG (ref 26–34)
MCHC RBC AUTO-ENTMCNC: 34 G/DL (ref 31–36)
MCV RBC AUTO: 105.9 FL (ref 80–100)
MONOCYTES ABSOLUTE: 0.6 K/UL (ref 0–1.3)
MONOCYTES RELATIVE PERCENT: 10.4 %
NEUTROPHILS ABSOLUTE: 3.3 K/UL (ref 1.7–7.7)
NEUTROPHILS RELATIVE PERCENT: 60.2 %
PDW BLD-RTO: 14.1 % (ref 12.4–15.4)
PLATELET # BLD: 284 K/UL (ref 135–450)
PMV BLD AUTO: 8.6 FL (ref 5–10.5)
POTASSIUM REFLEX MAGNESIUM: 4 MMOL/L (ref 3.5–5.1)
RBC # BLD: 4.29 M/UL (ref 4.2–5.9)
SODIUM BLD-SCNC: 136 MMOL/L (ref 136–145)
WBC # BLD: 5.5 K/UL (ref 4–11)

## 2022-07-18 PROCEDURE — 6370000000 HC RX 637 (ALT 250 FOR IP): Performed by: STUDENT IN AN ORGANIZED HEALTH CARE EDUCATION/TRAINING PROGRAM

## 2022-07-18 PROCEDURE — 97112 NEUROMUSCULAR REEDUCATION: CPT

## 2022-07-18 PROCEDURE — 97535 SELF CARE MNGMENT TRAINING: CPT

## 2022-07-18 PROCEDURE — 92526 ORAL FUNCTION THERAPY: CPT

## 2022-07-18 PROCEDURE — 1280000000 HC REHAB R&B

## 2022-07-18 PROCEDURE — 97116 GAIT TRAINING THERAPY: CPT

## 2022-07-18 PROCEDURE — 97129 THER IVNTJ 1ST 15 MIN: CPT

## 2022-07-18 PROCEDURE — 80048 BASIC METABOLIC PNL TOTAL CA: CPT

## 2022-07-18 PROCEDURE — 36415 COLL VENOUS BLD VENIPUNCTURE: CPT

## 2022-07-18 PROCEDURE — 92507 TX SP LANG VOICE COMM INDIV: CPT

## 2022-07-18 PROCEDURE — 97110 THERAPEUTIC EXERCISES: CPT

## 2022-07-18 PROCEDURE — 6360000002 HC RX W HCPCS: Performed by: STUDENT IN AN ORGANIZED HEALTH CARE EDUCATION/TRAINING PROGRAM

## 2022-07-18 PROCEDURE — 85025 COMPLETE CBC W/AUTO DIFF WBC: CPT

## 2022-07-18 RX ADMIN — ASPIRIN 81 MG: 81 TABLET, CHEWABLE ORAL at 07:41

## 2022-07-18 RX ADMIN — POLYVINYL ALCOHOL 2 DROP: 14 SOLUTION/ DROPS OPHTHALMIC at 07:42

## 2022-07-18 RX ADMIN — ENOXAPARIN SODIUM 40 MG: 100 INJECTION SUBCUTANEOUS at 07:41

## 2022-07-18 RX ADMIN — TRAZODONE HYDROCHLORIDE 50 MG: 50 TABLET ORAL at 20:18

## 2022-07-18 RX ADMIN — ATORVASTATIN CALCIUM 10 MG: 10 TABLET, FILM COATED ORAL at 20:18

## 2022-07-18 RX ADMIN — POLYVINYL ALCOHOL 2 DROP: 14 SOLUTION/ DROPS OPHTHALMIC at 20:18

## 2022-07-18 ASSESSMENT — PAIN SCALES - GENERAL
PAINLEVEL_OUTOF10: 0
PAINLEVEL_OUTOF10: 0

## 2022-07-18 NOTE — PROGRESS NOTES
Occupational Therapy  Facility/Department: [facility]  Rehabilitation Occupational Therapy Daily Treatment Note    Date: 22  Patient Name: Hilda Fulton       Room: 0085/7112-45  MRN: [id number]  Account: [de-identified]   : 1960  (63 y.o.) Gender: male                    Past Medical History:  has a past medical history of CVA (cerebral vascular accident) ([facility] [address] Past Surgical History:   has no past surgical history on file. Restrictions  Restrictions/Precautions: Fall Risk;General Precautions  Other position/activity restrictions: R sided weakness; up with assist    Subjective  Subjective: Pt in recliner, pleasant, feeling sore, disappointed about not moving very much this weekend, pain 2/10 generalized, /68, HR 87, O2 sats 98% on RA. Restrictions/Precautions: Fall Risk;General Precautions             Objective     Cognition  Overall Cognitive Status: Exceptions  Arousal/Alertness: Appropriate responses to stimuli  Following Commands: Follows multistep commands consistently; Follows multistep commands with repitition  Attention Span: Attends with cues to redirect  Memory: Appears intact  Safety Judgement: Decreased awareness of need for safety  Problem Solving: Assistance required to generate solutions;Assistance required to identify errors made  Insights: Decreased awareness of deficits  Initiation: Does not require cues  Sequencing: Requires cues for some  Orientation  Overall Orientation Status: Within Functional Limits   Perception  Overall Perceptual Status: Impaired  Unilateral Attention: Cues to attend right visual field  Initiation: Appears intact  Motor Planning: Appears intact  Perseveration: Not present     ADL  Grooming/Oral Hygiene  Assistance Level: Supervision  Skilled Clinical Factors: standing at sink for oral hygiene, improved ability to manage toothpaste without assist  Upper Extremity Bathing  Assistance Level: Supervision  Lower Extremity Bathing  Assistance Level: Supervision  Skilled Clinical Factors: while standing to bathe buttocks/back  Upper Extremity Dressing  Assistance Level: Set-up  Lower Extremity Dressing  Assistance Level: Minimal assistance  Skilled Clinical Factors: SPV when pulling over hips, min A to tie drawstring only  Putting On/Taking Off Footwear  Assistance Level: Set-up  Tub/Shower Transfers  Type: Shower  Transfer From: Standing without device  Transfer To: Tub transfer bench  Additional Factors: With handrails  Assistance Level: Stand by assist          Functional Mobility  Device:  (no AD)  Activity: To/From therapy gym; To/From bathroom  Assistance Level: Stand by assist  Transfers  Surface: To chair with arms;From chair with arms  Additional Factors: Set-up; Verbal cues; Increased time to complete;Hand placement cues  Sit to Stand  Assistance Level: Supervision  Stand to Sit  Assistance Level: Supervision  Bed To/From Chair  Technique: Stand step  Assistance Level: Supervision  Skilled Clinical Factors: no AD   OT Exercises  Resistive Exercises: 2# x20 shoulder flexion to 90*, elbow flexion, chest press BUE     Assessment  Assessment  Assessment: Pt agreeable to OT session. Pt completed BADLs with improved balance and safety with SPV/SBA for all mobility and standing tasks. Pt demonstrated improved use of RUE to assist with ADLs, including dressing and grooming. Pt demonstrated improved grasp/release with RUE to place and remove large beads from post with no assist of e-stim this date. Pt completed placing of 32 beads on post with RUE and removal of beads in simultaneous bimanual motion with cues to slowing LUE down. Pt completed BUE ther ex with improved RUE strength and minimal shoulder compensation. Continue POC.   Activity Tolerance: Patient tolerated treatment well  Discharge Recommendations: 24 hour supervision or assist;Outpatient OT  OT Equipment Recommendations  Equipment Needed: Yes  Mobility Devices: ADL Assistive Devices  ADL Assistive Devices: Reacher;Sock-Aid Hard;Elastic Shoe Laces;Grab Bars - tub;Transfer Tub Bench  Safety Devices  Safety Devices in place: Yes  Type of devices: Call light within reach; All fall risk precautions in place; Patient at risk for falls;Gait belt;Nurse notified; Left in bed;Bed alarm in place    Patient Education  Education  Education Given To: Patient  Education Provided: Role of Therapy; ADL Function;Equipment; Mobility Training;Transfer Training;Energy Conservation; Safety;Precautions;Plan of Care;DME/Home Modifications; Home Exercise Program;Visual Perceptual Function; Fall Prevention Strategies  Education Provided Comments: disease specific: ADL katlyn techniques, dynamic vs standing balance, safety with foot placement and clearance, NMR with e-stim, visual scanning, R sided awareness,  education/safety and restrictions  Education Method: Verbal;Demonstration  Barriers to Learning: Cognition  Education Outcome: Verbalized understanding;Continued education needed    Plan  Plan  Times per Week: 5 of 7 days  Current Treatment Recommendations: Strengthening;Balance training;Functional mobility training; Endurance training;Neuromuscular re-education;Positioning;Equipment evaluation, education, & procurement; Modalities; Patient/Caregiver education & training; Safety education & training;Self-Care / ADL; Home management training;Cognitive/Perceptual training;Coordination training;Sensory integraion    Goals  Short Term Goals  Time Frame for Short term goals: within 7 days by 7/6/22  Short Term Goal 1: Pt will perform toilet transfer with Mod A and LRAD- GOAL MET 7/11/22 Pt completed toilet transfer with CGA. Short Term Goal 2: Pt will perform LB dressing with Min A and AE PRN- GOAL MET 7/13/22 Pt performed LB dressing with min A.   Short Term Goal 3: Pt will perform UB dressing with Min A and appropriate katlyn techniques- GOAL MET 7/7  Short Term Goal 4: Pt will perform functional dynamic balance activity in stance for >5 minutes with Min A and LRAD- GOAL MET 7/6  Short Term Goal 5: Pt will perform 2-3 grooming tasks with Min A for during tasks standing vs sitting with LRAD and Min A for balance. GOAL MET 7/11/22 Pt completed grooming tasks with min A. Long Term Goals  Time Frame for Long term goals : within 21 days by 7/20/22  Long Term Goal 1: Pt will perform TB dressing Mod I  Long Term Goal 2: Pt will perfrom TB bathing Mod I and AE PRN  Long Term Goal 3: Pt will perform simple IADL task in stance with LRAD for >7 minutes with supervision. GOAL MET 7/15/22 Pt completed IADL task with supervision and stood greater than 7 minutes. Long Term Goal 4: Pt will self feed with R hand with Min A  Long Term Goal 5: Pt will increase Block and Box score on R hand by >4 blocks- goal progressing as of assessment 7/7 R hand = 1 block in 60 seconds. GOAL MET 7/14/22 Pt transferred 8 blocks in Box and Blocks assessment.         Therapy Time   Individual Concurrent Group Co-treatment   Time In 1582         Time Out 1135         Minutes 60         Timed Code Treatment Minutes: [address]

## 2022-07-18 NOTE — PROGRESS NOTES
Speech Language Pathology  [facility]: ACUTE REHAB UNIT  SPEECH-LANGUAGE PATHOLOGY      [x] Daily  [] Weekly Care Conference Note  [] Discharge    No De La Rosa      INB:[birth year]  TVM:[id number]  Rehab Dx/Hx: Acute CVA (cerebrovascular accident) ([facility] [address] [I63.9]    Precautions: falls  Home situation: lives with brother, manages own finances, did not take any meds PTA, active   ST Dx: [] Aphasia  [x] Dysarthria  [] Apraxia   [] Oropharyngeal dysphagia [x] Cognitive Impairment  [] Other:   Date of Admit: 6/29/2022  Room #: 0166/0166-01    Current functional status (updated daily):         Pt being seen for : [x] Speech/Language Treatment  [] Dysphagia Treatment [x] Cognitive Treatment  [] Other:  Communication: []WFL  [] Aphasia  [x] Dysarthria  [] Apraxia  [] Pragmatic Impairment [] Non-verbal  [] Hearing Loss  [] Other:   Cognition: [] WFL  [x] Mild  [] Moderate  [] Severe [] Unable to Assess  [] Other:  Memory: [] WFL  [x] Mild  [] Moderate  [] Severe [] Unable to Assess  [] Other:  Behavior: [x] Alert  [x] Cooperative  [x]  Pleasant  [] Confused  [] Agitated  [] Uncooperative  [] Distractible [] Motivated  [] Self-Limiting [] Anxious  [] Other:  Endurance:  [x] Adequate for participation in SLP sessions  [] Reduced overall  [] Lethargic  [] Other:  Safety: [x] No concerns at this time  [] Reduced insight into deficits  []  Reduced safety awareness [] Not following call light procedures  [] Unable to Assess  [] Other:    Current Diet Order:ADULT ORAL NUTRITION SUPPLEMENT; Lunch, Dinner; Frozen Oral Supplement  ADULT ORAL NUTRITION SUPPLEMENT; Breakfast, Dinner; Standard 4 oz Oral Supplement  ADULT DIET;  Regular  Swallowing Precautions: upright for all intake, stay upright for at least 30 mins after intake, small bites/sips, set-up assistance / distant supervision with intake, alternate bites/sips, slow rate of intake, general aspiration precautions and hold PO and contact SLP if s/s of aspiration or worsening respiratory status develop. Date: 7/18/2022      Tx session 1  [id number]  Giuseppe Jurado MA [facility] SLP  Tx session 2  [id number]  Misael Gaffney MA CCC-SLP    Total Timed Code Min 0 30   Total Treatment Minutes 30 30   Individual Treatment Minutes 30 30   Group Treatment Minutes 0 0   Co-Treat Minutes 0 0   Variance/Reason:  0 0   Pain Denies Denies   Pain Intervention [] RN notified  [] Repositioned  [] Intervention offered and patient declined  [x] N/A  [] Other: [] RN notified  [] Repositioned  [] Intervention offered and patient declined  [x] N/A  [] Other:   Subjective     Pt alert and oriented, cooperative and agreeable to participate in therapy. Pt seen sitting upright in bedside chair. Pt alert and oriented, cooperative and agreeable to participate in therapy. Pt seen sitting upright in bedside chair. Objective:  Goals     Dysphagia Goals:   Goal met 07/14/22. Pt is tolerating IDDSI 7 Regular, IDDSI 0 Thin, meds whole in puree as LRD. Goal met 07/14/22. Pt is tolerating IDDSI 7 Regular, IDDSI 0 Thin, meds whole in puree as LRD. Goal met 07/14/22. Goal met 07/14/22. Goal met 07/12/22. FEES completed. See separate speech therapy note for details. Goal met 07/12/22. FEES completed. See separate speech therapy note for details. Goal met 07/14/22. Pt has demonstrated overall improvement with laryngeal / pharyngeal strength; pt is tolerating regular solids and thin liquids. Goal met 07/14/22. *New goal added 07/01/22*  Goal 5. The pt will complete OME's for improved oral motor function, coordination, strength, and ROM in 10/10 trials, min cues. OME's completed with vital stim (NMES) in place:  -placement 4a at 9.5mA for 20 mins   -pt tolerated without any adverse reactions or side effects  -smile: x30  -pucker: x30  -smile/pucker: x30  -cheek puff hold for 3 secs: x30  -cheek/eye squint: x30  -open wide: x30  -pucker to R side: x30   Did not target.  Pt stated he just completed exercises in previous SLP session and would complete another round later that afternoon indep. Speech/Lang/Cog goals:  Short-term Goals  Timeframe for Short-term *goals extended through 07/22/22*    Goal 1: The pt will complete graded recall tasks with 90% accuracy, min-no cues. Did not target. Pt demonstrated recall of weekend and events of morning with 90% accuracy, given no cues. Goal 2: The pt will complete executive function tasks (i.e. planning, deductive reasoning, inferential, functional organization tasks) with 90% accuracy no cues. Did not target. Word deduction/executive functioning puzzle task given this date:  - Pt achieved 50% acc indep. - Pt needed mod verbal cues to reduce appearance of frustration and continue working- pt achieved 100% accuracy after mod-max cues. Goal 3: The pt will complete high-level problem-solving tasks (*particularly related to safety scenarios) with 95% accuracy, no cues. Did not target. Pt completed high level daily money problem solving task with 80% accuracy, and 100% accuracy in 2 separate trials. Pt self corrected x2 during trials. Goal 4: The pt will complete articulatory agility tasks with 70% accuracy, mod cues. Did not target. Did not target. Goal 5: The pt will effectively use compensatory speech strategies during structured speech tasks with 70% accuracy, mod cues. Pt's overall speech judged to be ~80% intelligible throughout conversations today with SLP. Pt indep self corrected, and implemented use of slow rate and over articulation. Pt reported he has been completing OME's, singing, and working on his speech strategies indep outside of tx sessions. Pt utilized SLOB (slow, loud, overaticulate, breath support) strategy during structured reading task.   - Pt read sentence by sentence, needed mod cues for loud + slow  - Pt used strategies 70% with mod cues  - Pt's intelligibility ~85% acc with strategies used during read aloud task. Other areas targeted: N/a N/A   Education:    Edu provided re: progress towards goals, speech strategies  SLP edu pt on: reasoning cog tx tasks, pt's therapy schedule for that date, compensatory speech strategies     Safety Devices: [x] Call light within reach  [x] Chair alarm activated  [] Bed alarm activated  [] Other: [x] Call light within reach  [x] Chair alarm activated  [] Bed alarm activated  [] Other:    Assessment: Session 1: Pt pleasant and cooperative, agreeable to participate. Pt tolerated vital stim (NMES) placement 4a at 9.5mA for 20 mins without any adverse reactions or side effects. Pt completed x30 of OME's with NMES in place indep. Pt's overall speech judged to be ~80% intelligible today during conversations with SLP, pt indep demonstrating appropriate use of slow rate and over articulation. Pt is making consistent progress towards all goals within POC and remains highly motivated to improve. Session 2: Pt alert and cooperative this date. Pt stated that his goal was to eat \"lunch\" this date, as he had a busy morning and did not get to eat his full breakfast. Pt denied wanting more food this date, and told the SLP that he was \"ready to begin tx. \" Pt stated events of morning with 90% accuracy indep. Pt stated frustration of not having therapy over the weekend, stating that he was \"atrophying. \" RN stopped in during session, pt voiced concerns to RN over use of R hand. RN provided counseling to pt, with pt verbalizing understanding. Pt able to complete 2/2 money tasks with 80%, and 100% acc indep. Pt able to use speech strategies with 70% accuracy, given mod cues to achieve 85% intelligibility during read aloud task. Pt motivated to improve on speech/cog goals. Continue with plan of care. Plan: Continue as per plan of care.       Additional Information:     Barriers toward progress: None  Discharge recommendations:  [] Home independently  [x] Home with assistance []  24 hour supervision  [] ECF [] Other:  Continued Tx Upon Discharge: ? [] Yes [] No [x] TBD based on progress while on ARU [] Vital Stim indicated [] Other:   Estimated discharge date: 07/22/22    Interventions used this date:  [] Speech/Language Treatment  [] Instruction in HEP [] Group [x] Dysphagia Treatment [x] Cognitive Treatment   [] Other: Total Time Breakdown / Charges    Time in Time out Total Time / units   Cognitive Tx 0930 0945 15 mins / 1 unit   Speech Tx 0945  0850 1000  0900 15 mins / 1 unit  10 min/ 1 unit    Dysphagia Tx 0830 0850 20 mins/ 1 unit      Electronically Signed by     Session 1:   Getachew Mendiola. A CCC-SLP  Speech-Language Pathologist  [id number]    Session 2:   Diana Payne. MACARIO Pierre  Speech Language Pathologist  [place]. [id number]

## 2022-07-18 NOTE — PROGRESS NOTES
Physical Therapy  Facility/Department: [facility]  Rehabilitation Physical Therapy Treatment Note    NAME: Miladys Morejon  : 1960 (60 y.o.)  MRN: [id number]  CODE STATUS: Full Code    Date of Service: 22       Restrictions:  Restrictions/Precautions: Fall Risk;General Precautions  Position Activity Restriction  Other position/activity restrictions: R sided weakness; up with assist     SUBJECTIVE  Subjective  Subjective: pt found sitting EOB this morning eating breakfast  Pain: pt reports general aches today            OBJECTIVE  Cognition  Arousal/Alertness: Appropriate responses to stimuli  Following Commands: Follows multistep commands consistently; Follows multistep commands with repitition  Attention Span: Attends with cues to redirect  Memory: Appears intact  Safety Judgement: Decreased awareness of need for safety  Problem Solving: Assistance required to generate solutions;Assistance required to identify errors made  Insights: Decreased awareness of deficits  Initiation: Does not require cues  Sequencing: Requires cues for some  Cognition Comment: impulsive, cues to attend to R side of body  Orientation  Overall Orientation Status: Within Functional Limits  Orientation Level: Oriented to time;Oriented to situation;Oriented to person;Oriented to place    Functional Mobility     Pt found sitting EOB today. Vitals assessed. STS from EOB w/o AD and SBA. Pt notes some unsteadiness when standing initially and states that he has not been up in a while. Gait x180ft to therapy gym SBA without AD. Pt demos limping gait pattern with pt avoiding Wbing on R LE. Pt reports that he can feel his R knee locking out with each step. PT notes excessive extension thrust in stance phase on the R. Pt begins to compensate with reduced step lengths and increased coronal plane motion about the trunk and hips. Pt has ace wrap donned to R knee for pt comfort throughout gait bout.      In // bars PT dons 2lb ankle weights. Pt completes x6 marching length of // bars w/ cues to not use handrails and CGA. Pt also cued to move slow and focus on maintaining balance throughout. Pt demos brief pause before each march to prepare legs for brief single leg stance. Pt displays unsteadiness when Wbing on R LE and turning due to tendency for narrow SERENE. In // bars, pt completes x10 step up onto blue airex with cues to alternate feet. Pt demos difficulty with placement of R LE and initially shows significant unsteadiness when stepping back with R LE. With repetition and cuing pt performance improves and he shows greater stability. In // bars pt completes x6 side stepping length of // bars with cues to not utilize handrails. Cues also provided to step high and focus on control pt states that it is more difficult going to the R than it is to the L. Ankle weights doffed  X180ft gait back to room through ARU SBA-CGA. Pt initially shows improved gait with lack of knee hyperextension and limping, but eventually he reports locking out of knee and begins to limp again with excessive hip and trunk rotation. Pt returned to room and states that knee just feels uncomfortable today. Pt left in recliner with all needs within reach and call alarm in place     2nd session:   Pt found sitting EOB. Pt completes STS from EOB with supv. X150ft gait to therapy gym with SBA. Pt continues to demo limping gait with R knee hyperextension in stance. PT assists pt with donning ace wrap around knee for support. Pt states that knee feels slightly better with wrap. Dynamic gait exercise with CGA. PT places cones around therapy gym on floor. Pt cued to  cones in order of the rainbow w/ R UE. Pt has difficulty coordinating R hand and wrist but is able to maintain squat while he picks up cones with CGA. Completes x10 min.      Dynamic standing exercise with CGA: pt tasked with reaching overhead to  cones with R UE then turn and squat down to place them on floor behind him. Pt completes x7 cones and notes initial inability to reach overhead but is able to complete with encouragement. Once again pt has difficulty coordinating and controlling R UE but is able to maintain balance when standing or squatting to stack cones. X300ft SBA gait back to room through ARU. Pt again demos limping gait with excessive knee hyperextension on R. Cues provided for heel strike and keeping knees slightly bent for gait. Pt returned to bed with all needs within reach. ASSESSMENT/PROGRESS TOWARDS GOALS  Vital Signs  Heart Rate: 85  Heart Rate Source: Monitor  BP: 116/83  BP Location: Right upper arm  MAP (Calculated): 94  SpO2: 100 %  O2 Device: None (Room air)    Assessment  Assessment: pt seen this AM by PT in order to address deficits in strength, endurance, gait and overall mobility. pt continues to demo R sided deficits. pt reports R knee discomfort with gait and PT notes hyperextension thrust in stance and terminal stance during gait. pt also begins to reduce his stance time on R LE and demos limping gait with weight shift to the L in order to reduce load on R. pt performance and mobility improves slightly as therapy session progresses. PT rec DC home with 24/7 and HHPT vs OPPT. 2nd session:  Pt seen this afternoon in order to progress mobility, strength, and endurance. Pt completes functional reaching tasks with emphasis on R UE use due to continued deficits in control and coordination. Pt demos need for CGA when reaching OH and squatting to the floor. Pt able to maintain squat for several minutes while attempting to properly place objects with R UE. Pt continues to demo limping gait with knee hyperextension this session.      Activity Tolerance: Patient tolerated treatment well  Discharge Recommendations: 24 hour supervision or assist;Patient would benefit from continued therapy after discharge  PT Equipment Recommendations  Equipment Needed: No    Goals  Patient Goals   Patient goals : \"to be able to walk again and use my R arm\"  Short Term Goals  Time Frame for Short term goals: 10 days (7/9/22)  Short term goal 1: Pt will perform bed mobility with min A  -7/08: goal met: SBA  Short term goal 2: Pt will perform transfers with min A  -7/08: goal met: CGA-Omero  Short term goal 3: Pt will ambulate 25' with quad cane and mod A  -7/05 Goal met  Short term goal 4: Pt will negotiate 4 steps with HR and mod A  -7/05 Goal met  Long Term Goals  Time Frame for Long term goals : 21 days (7/20/22)  Long term goal 1: Pt will perform bed mobility mod I  Long term goal 2: Pt will perform transfers with supervision  Long term goal 3: Pt will ambulate 48' with quad cane and CGA 7/05 update goal: Pt will ambulate 150' with RW and clamshell with supervision  Long term goal 4: Pt will propel wheelchair 150' mod I; 7/05 discontinue goal  Long term goal 5: Pt will negotiate 4 steps with HR and min A    PLAN OF CARE/SAFETY  Plan  Plan:  minutes of therapy at least 5 out of 7 days a week  Plan weeks: 21 days  Specific Instructions for Next Treatment: R katlyn strength training and continue mobility progression  Current Treatment Recommendations: Strengthening;ROM;Balance training;Functional mobility training;Transfer training; Endurance training; Wheelchair mobility training;Gait training;Stair training;Neuromuscular re-education;Manual Therapy - Soft Tissue Mobilization;Patient/Caregiver education & training; Safety education & training;Home exercise program;Equipment evaluation, education, & procurement; Modalities; Therapeutic activities;ADL/Self-care training;IADL training  Safety Devices  Type of Devices: All fall risk precautions in place;Call light within reach; Chair alarm in place;Gait belt;Nurse notified; Left in chair;Patient at risk for falls  Restraints  Restraints Initially in Place: No    EDUCATION  Education  Education Given To: Patient  Education Provided: Role of Therapy; Safety;Equipment; Mobility Training; Fall Prevention Strategies;Transfer Training;Plan of Care;Precautions; Home Exercise Program  Education Provided Comments: educated pt on fall prevention strategies, no carryover  Education Method: Demonstration;Verbal  Barriers to Learning: Cognition  Education Outcome: Continued education needed        Therapy Time   Individual Concurrent Group Co-treatment   Time In 0800         Time Out 0830         Minutes 30           Timed Code Treatment Minutes: 30 Minutes     Second Session Therapy Time:   Individual Concurrent Group Co-treatment   Time In 1330         Time Out 1400         Minutes 30           Timed Code Treatment Minutes:  30    Total Treatment Minutes:   [address] [facility], 07/18/22 at 9:11 AM

## 2022-07-18 NOTE — PROGRESS NOTES
Shahzad Lizzy  7/18/2022  [id number]    Chief Complaint: Acute CVA (cerebrovascular accident) [facility])    Subjective:   No acute events overnight. Today Nga Casillas is seen in his room working with Speech. He reports continued discomfort on his right lateral foot. He reports that this pain has been chronic and going on for several years. He denies other acute complaints at this time. ROS: denies f/c, n/v, cp, sob    Objective:  Patient Vitals for the past 24 hrs:   BP Temp Temp src Pulse Resp SpO2   07/18/22 0922 -- -- -- 85 -- --   07/18/22 0904 100/63 -- -- 88 -- 99 %   07/18/22 0730 116/83 98.1 °F (36.7 °C) Oral 85 17 100 %   07/17/22 2003 115/74 98.1 °F (36.7 °C) Oral 84 18 96 %     Gen: No distress, pleasant. Seated in chair  HEENT: Normocephalic, atraumatic. CV: extremities well perfused  Resp: no respiratory distress  Abd: Soft, nontender   Ext: No edema. Neuro: Alert, oriented, appropriately interactive. Right hemiparesis    Wt Readings from Last 3 Encounters:   07/16/22 146 lb 5.7 oz (66.4 kg)   02/14/22 145 lb (65.8 kg)       Laboratory data:   Lab Results   Component Value Date    WBC 5.5 07/18/2022    HGB 15.4 07/18/2022    HCT 45.5 07/18/2022    .9 (H) 07/18/2022     07/18/2022       Lab Results   Component Value Date/Time     07/18/2022 08:47 AM    K 4.0 07/18/2022 08:47 AM    CL 97 07/18/2022 08:47 AM    CO2 28 07/18/2022 08:47 AM    BUN 14 07/18/2022 08:47 AM    CREATININE 0.8 07/18/2022 08:47 AM    GLUCOSE 164 07/18/2022 08:47 AM    CALCIUM 10.6 07/18/2022 08:47 AM        Therapy progress:  PT  Position Activity Restriction  Other position/activity restrictions: R sided weakness; up with assist  Objective     Sit to Stand:  Moderate Assistance  Stand to sit: Moderate Assistance  Bed to Chair: Moderate assistance (to L with cues for safety)  Device: Parallel Bars  Assistance: Minimal assistance  Distance: 8' and 20'  OT  PT Equipment Recommendations  Equipment Needed: No  Toilet - Technique: Ambulating  Equipment Used: Standard toilet (with L grab bar)  Toilet Transfers Comments: cues for safe hand placement; decreased proprioception when going to initate sit down to toilet   Assessment        SLP  Current Diet : Soft and Bite-Sized             Body mass index is 18.79 kg/m². Assessment and Plan:  Logan Quijano is a 60year old male with a past medical history significant for left sided stroke due to ICA dissection (2018) without residual deficits, HLD, and nicotine dependence who presented to  on 6/26/22 with acute onset of right face, arm, and leg weakness, found to have acute CVA. He was admitted to [facility] on 6/29/22 due to functional deficits below his baseline. 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 Follow bowel movements. Enema or suppository if needed. Bladder: Check PVR x 3. [address] if PVR > 200ml or if any volume is > 500 ml. Sleep: Trazodone provided prn. Follow up appointments: PCP, Stroke clinic    Services: home health versus outpatient PT, OT, ST  EDOD: 7/22/22    Arpita Huang.  Brittny Issa MD 7/18/2022, 12:55 PM

## 2022-07-18 NOTE — PROGRESS NOTES
Comprehensive Nutrition Assessment    Type and Reason for Visit:  Reassess    Nutrition Recommendations/Plan:   Continue regular diet   Diet texture/liquid level per SLP recommendations  Encourage po intakes  Modify ONS to ensure BID  Monitor po intakes, nutrition adequacy, weights, pertinent labs, BMs     Malnutrition Assessment:  Malnutrition Status: Moderate malnutrition (06/30/22 1649)    Context:  Acute Illness     Findings of the 6 clinical characteristics of malnutrition:  Energy Intake:  No significant decrease in energy intake  Weight Loss:  Unable to assess     Body Fat Loss:  Mild body fat loss Orbital   Muscle Mass Loss:  Mild muscle mass loss Temples (temporalis), Clavicles (pectoralis & deltoids)    Nutrition Assessment:    Follow up: Pt stable nutritionally AEB PO intaks of % of meals, mostly % of meals. Pt upgraded from mildly thick liquids to thin liquids on 7/12. On regular diet. Pt enjoys food but pt does not like carrots, RD to add tray ticket for no carrots. Pt likes ensure compact, willing to trial regular ensure, RD to add. Wt stable. Pt does not like magic cup, RD to d/c. Continue to encourage PO intake, will continue to monitor. Nutrition Related Findings:    + BM yesterday. Wound Type: None       Current Nutrition Intake & Therapies:    Average Meal Intake: 51-75%, %  Average Supplements Intake: 51-75%, %  ADULT ORAL NUTRITION SUPPLEMENT; Lunch, Dinner; Frozen Oral Supplement  ADULT ORAL NUTRITION SUPPLEMENT; Breakfast, Dinner; Standard 4 oz Oral Supplement  ADULT DIET; Regular    Anthropometric Measures:  Height: 6' 2\" (188 cm)  Ideal Body Weight (IBW): 190 lbs (86 kg)       Current Body Weight: 145 lb 15.1 oz (66.2 kg), 76.3 % IBW. Weight Source: Bed Scale  Current BMI (kg/m2): 18.7  Usual Body Weight: 165 lb (74.8 kg) (per pt)  % Weight Change (Calculated): -12.1                    BMI Categories: Normal Weight (BMI 18.5-24. 9)    Estimated Daily Nutrient

## 2022-07-18 NOTE — PLAN OF CARE
Problem: Skin/Tissue Integrity  Goal: Absence of new skin breakdown  Description: 1. Monitor for areas of redness and/or skin breakdown  2. Assess vascular access sites hourly  3. Every 4-6 hours minimum:  Change oxygen saturation probe site  4. Every 4-6 hours:  If on nasal continuous positive airway pressure, respiratory therapy assess nares and determine need for appliance change or resting period.   Outcome: Progressing Towards Goal

## 2022-07-19 PROCEDURE — 97110 THERAPEUTIC EXERCISES: CPT

## 2022-07-19 PROCEDURE — 97116 GAIT TRAINING THERAPY: CPT

## 2022-07-19 PROCEDURE — 6370000000 HC RX 637 (ALT 250 FOR IP): Performed by: STUDENT IN AN ORGANIZED HEALTH CARE EDUCATION/TRAINING PROGRAM

## 2022-07-19 PROCEDURE — 97032 APPL MODALITY 1+ESTIM EA 15: CPT

## 2022-07-19 PROCEDURE — 92507 TX SP LANG VOICE COMM INDIV: CPT

## 2022-07-19 PROCEDURE — 97112 NEUROMUSCULAR REEDUCATION: CPT

## 2022-07-19 PROCEDURE — 97129 THER IVNTJ 1ST 15 MIN: CPT

## 2022-07-19 PROCEDURE — 1280000000 HC REHAB R&B

## 2022-07-19 PROCEDURE — 6360000002 HC RX W HCPCS: Performed by: STUDENT IN AN ORGANIZED HEALTH CARE EDUCATION/TRAINING PROGRAM

## 2022-07-19 PROCEDURE — 97130 THER IVNTJ EA ADDL 15 MIN: CPT

## 2022-07-19 RX ADMIN — ATORVASTATIN CALCIUM 10 MG: 10 TABLET, FILM COATED ORAL at 20:54

## 2022-07-19 RX ADMIN — ACETAMINOPHEN 650 MG: 325 TABLET ORAL at 20:54

## 2022-07-19 RX ADMIN — ASPIRIN 81 MG: 81 TABLET, CHEWABLE ORAL at 07:45

## 2022-07-19 RX ADMIN — POLYVINYL ALCOHOL 2 DROP: 14 SOLUTION/ DROPS OPHTHALMIC at 20:54

## 2022-07-19 RX ADMIN — TRAZODONE HYDROCHLORIDE 50 MG: 50 TABLET ORAL at 20:54

## 2022-07-19 RX ADMIN — ENOXAPARIN SODIUM 40 MG: 100 INJECTION SUBCUTANEOUS at 07:45

## 2022-07-19 RX ADMIN — POLYVINYL ALCOHOL 2 DROP: 14 SOLUTION/ DROPS OPHTHALMIC at 07:46

## 2022-07-19 ASSESSMENT — PAIN DESCRIPTION - ONSET: ONSET: GRADUAL

## 2022-07-19 ASSESSMENT — PAIN DESCRIPTION - LOCATION: LOCATION: SHOULDER

## 2022-07-19 ASSESSMENT — PAIN DESCRIPTION - ORIENTATION: ORIENTATION: RIGHT

## 2022-07-19 ASSESSMENT — PAIN DESCRIPTION - DESCRIPTORS: DESCRIPTORS: ACHING;DISCOMFORT

## 2022-07-19 ASSESSMENT — PAIN DESCRIPTION - FREQUENCY: FREQUENCY: INTERMITTENT

## 2022-07-19 ASSESSMENT — PAIN SCALES - GENERAL: PAINLEVEL_OUTOF10: 3

## 2022-07-19 NOTE — PROGRESS NOTES
placement; decreased proprioception when going to initate sit down to toilet   Assessment        SLP  Current Diet : Soft and Bite-Sized             Body mass index is 18.79 kg/m². Assessment and Plan:  Jeffery Medina is a 60year old male with a past medical history significant for left sided stroke due to ICA dissection (2018) without residual deficits, HLD, and nicotine dependence who presented to  on 6/26/22 with acute onset of right face, arm, and leg weakness, found to have acute CVA. He was admitted to [facility] on 6/29/22 due to functional deficits below his baseline. 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 Follow bowel movements. Enema or suppository if needed. Bladder: Check PVR x 3. [address] if PVR > 200ml or if any volume is > 500 ml. Sleep: Trazodone provided prn. Follow up appointments: PCP, Stroke clinic    Services: home health versus outpatient PT, OT, ST  EDOD: 7/22/22    Wilfred Knox.  José Antonio Robles MD 7/19/2022, 1:47 PM

## 2022-07-19 NOTE — PATIENT CARE CONFERENCE
[facility]  Inpatient Rehabilitation  Weekly Team Conference Note    Date: 2022  Patient Name: Nyasia Alvarado        MRN: [id number]    : 1960  (63 y.o.)  Gender: male   Referring Practitioner: Aroldo Pavon MD  Diagnosis: acute CVA      Interventions to be utilized toward barriers to discharge, per discipline:  NURSING  Nursing observed barriers to dc: Impulsivity, Communication deficit, Upper extremity weakness, Lower extremity weakness, and Medication managment  Nursing interventions: Assist with ADLs, medication administration  Family Education: yes  Fall Risk:  Yes      PHYSICAL THERAPY  Physical therapy observed barriers to dc:    Baseline: pt lived at home and was Ind in all mobility and self-care   Current level: pt is supv for STS transfer, SBA for gait and stairs, and Mod I for bed mobility. Barriers to DC: pt lacks safety awareness and continues to have deficits along R side with control, coordination, and strength. Pt demos impulsivity and lack of awareness at times, especially on R side, that have led to instances of LOB. Needs in order to achieve dc home/next level of care: pt needs to be Mod I with all mobility.  PT rec DC home with OPPT and  assist.      Physical therapy interventions:   Current Treatment Recommendations: Strengthening, ROM, Balance training, Functional mobility training, Transfer training, Endurance training, Wheelchair mobility training, Gait training, Stair training, Neuromuscular re-education, Manual Therapy - Soft Tissue Mobilization, Patient/Caregiver education & training, Safety education & training, Home exercise program, Equipment evaluation, education, & procurement, Modalities, Therapeutic activities, ADL/Self-care training, IADL training    PT Goals:            Short Term Goals  Time Frame for Short term goals: 10 days (22)  Short term goal 1: Pt will perform bed mobility with min A  -: goal met: SBA  Short term goal 2: Pt will perform transfers with min A  -7/08: goal met: CGA-Omero  Short term goal 3: Pt will ambulate 25' with quad cane and mod A  -7/05 Goal met  Short term goal 4: Pt will negotiate 4 steps with HR and mod A  -7/05 Goal met            Long Term Goals  Time Frame for Long term goals : 21 days (7/20/22)  Long term goal 1: Pt will perform bed mobility mod I  Long term goal 2: Pt will perform transfers with supervision  Long term goal 3: Pt will ambulate 48' with quad cane and CGA 7/05 update goal: Pt will ambulate 150' with RW and clamshell with supervision  Long term goal 4: Pt will propel wheelchair 150' mod I; 7/05 discontinue goal  Long term goal 5: Pt will negotiate 4 steps with HR and min A    PT Assessment:    Recommendation:   PT Equipment Recommendations  Equipment Needed: No      Assessment  Assessment: pt seen this AM in order to address deficits in mobility. pt again demos hyperextension in stance phase on R LE which is bothersome to him. PT dons ace wrap around R knee for pt comfort which helps initially but loses benefit quickly. PT then dons DF, knee flexion wrap with thera band. pt reports enjoying the assist this provides but after ~50ft of gait, pt reports sharp R thigh pain that occurs with each step, this resolves with a short break but occurs again after a few steps. pt is overall SBA for gait and supv-SBA for transfers today.  PT rec DC to HHPT vs OPPT with 24/7 assist.  Activity Tolerance: Patient tolerated treatment well  Discharge Recommendations: 24 hour supervision or assist;Patient would benefit from continued therapy after discharge  PT Equipment Recommendations  Equipment Needed: No     OCCUPATIONAL THERAPY  Occupational therapy observed barriers to dc:    Baseline: independent with no AD              Current level: SBA/CGA mobility, CGA/SBA LB ADLs, R hemiparesis              Barriers to DC: safety awareness, decreased balance              Needs in order to achieve dc home/next level of care: confirm 24/7 supervision at home (per pt report he lives with his brother); Toileting - 3-in-1 Commode;Grab Bars - shower; Shower Chair with back; Reacher; Sock-Aid Hard; Elastic Shoe Laces    Occupational Therapy interventions:  Current Treatment Recommendations: Strengthening, Balance training, Functional mobility training, Endurance training, Neuromuscular re-education, Positioning, Equipment evaluation, education, & procurement, Modalities, Patient/Caregiver education & training, Safety education & training, Self-Care / ADL, Home management training, Cognitive/Perceptual training, Coordination training, Sensory integraion    OT Goals:  Patient Goals   Patient goals : \"to get back to normal\"  Short Term Goals  Time Frame for Short term goals: within 7 days by 7/6/22  Short Term Goal 1: Pt will perform toilet transfer with Mod A and LRAD- GOAL MET 7/11/22 Pt completed toilet transfer with CGA. Short Term Goal 2: Pt will perform LB dressing with Min A and AE PRN- GOAL MET 7/13/22 Pt performed LB dressing with min A. Short Term Goal 3: Pt will perform UB dressing with Min A and appropriate katlyn techniques- GOAL MET 7/7  Short Term Goal 4: Pt will perform functional dynamic balance activity in stance for >5 minutes with Min A and LRAD- GOAL MET 7/6  Short Term Goal 5: Pt will perform 2-3 grooming tasks with Min A for during tasks standing vs sitting with LRAD and Min A for balance. GOAL MET 7/11/22 Pt completed grooming tasks with min A. Long Term Goals  Time Frame for Long term goals : within 21 days by 7/20/22  Long Term Goal 1: Pt will perform TB dressing Mod I  Long Term Goal 2: Pt will perfrom TB bathing Mod I and AE PRN  Long Term Goal 3: Pt will perform simple IADL task in stance with LRAD for >7 minutes with supervision. GOAL MET 7/15/22 Pt completed IADL task with supervision and stood greater than 7 minutes.   Long Term Goal 4: Pt will self feed with R hand with Min A  Long Term Goal 5: Pt will increase Block and Box score on R hand by >4 blocks- goal progressing as of assessment 7/7 R hand = 1 block in 60 seconds. GOAL MET 7/14/22 Pt transferred 8 blocks in Box and Blocks assessment. OT Assessment:  Assessment  Assessment: Pt agreeable to OT session. Pt demonstrated improved grasp/release of RUE and improved elbow extension with cues. Pt assisted with grasp/release for start of task with e-stim but improved with grasp to not use e-stim and able to grasp/release well with cues to attention to task. Pt knocking over items a few times with RUE when not attending fully to hand. Pt performed weight bearing while standing to place LLE on disco-sit and leaning to R when reaching out of SERENE. Pt  stood for 2.5 minutes, 1 minute, 1 minute, and 2.5 minutes when transferring Squigz from R to L. Pt continues to require cues for safety and assist when sitting back on mat, up to mod A when losing balance on disco-sit, and decreased safety and insight. Continue POC. Activity Tolerance: Patient tolerated treatment well  Discharge Recommendations: 24 hour supervision or assist;Outpatient OT  OT Equipment Recommendations  Equipment Needed: Yes  Mobility Devices: ADL Assistive Devices  ADL Assistive Devices: Reacher;Sock-Aid Hard;Elastic Shoe Laces;Grab Bars - tub;Transfer Tub Bench  Safety Devices  Safety Devices in place: Yes  Type of devices: All fall risk precautions in place; Patient at risk for falls;Gait belt;Nurse notified; Left in chair (left with PT)    SPEECH THERAPY   Speech therapy observed barriers to dc:    Baseline: lives with brother, manages own finances, was not taking any meds PTA, active               Current level: mod dysarthria, mild high level cog               Barriers to DC: reduced speech intelligibility, reduced insight/safety              Needs in order to achieve dc home/next level of care: carryover of compensatory strategies for improved speech intelligibility, carryover of compensatory strategies for improved cognition, improved safety/insight     Speech Therapy interventions:  Dysphagia: All dysphagia goals met   Speech/Language/Cognition: Compensatory strategy training and carryover, recall/STM, problem solving, reasoning, exec function, thought organization, attention, speech strategies for improved intelligibility       Dysphagia Goals:  Timeframe for Long-term Goals: 21 days (7/20/22)  Goal 1: The pt will tolerate safest and least restrictive diet without clinical s/s of aspiration or change in respiratory status. 07/14: Goal met   Short-term Goals  Timeframe for Short-term Goals: 14 days (7/13/22)  Goal 1: The pt will complete laryngeal/pharyngeal strengthening exercises in 10/10 trials, min cues. 07/14: Goal met   Goal 2. The patient will tolerate recommended diet without observed clinical signs of aspiration. 07/14: Goal met   Goal 3. The patient/caregiver will demonstrate understanding of compensatory strategies for improved swallowing safety. 07/14: Goal met   Goal 4. The patient will tolerate instrumental swallowing procedure. 07/12 - Goal Met - FEES completed   Goal 5. The pt will complete OME's for improved oral motor function, coordination, strength, and ROM in 10/10 trials, min cues. 07/19: progressing, ongoing     Speech/Language/Cog Goals:  Timeframe for Long-term Goals: 21 days (7/20/22)  Goal 1: The pt will effectively use reviewed compensatory strategies for improved safety and independence upon d/c. 07/14: progressing, ongoing  Goal 2: The pt will effectively use reviewed compensatory speech strategies for improved speech intelligibility in conversation with unfamiliar listener. 07/14: progressing, ongoing  Short-term Goals  Short-term Goals  goals extended through 07/22/22*  Goal 1: The pt will complete graded recall tasks with 90% accuracy, min-no cues.  07/14: progressing, ongoing  Goal 2: The pt will complete executive function tasks (i.e. planning, deductive reasoning, inferential, functional organization tasks) with 90% accuracy no cues. 07/14: progressing, ongoing  Goal 3: The pt will complete high-level problem-solving tasks (*particularly related to safety scenarios) with 95% accuracy, no cues. 07/14: progressing, ongoing  Goal 4: The pt will complete articulatory agility tasks with 70% accuracy, mod cues. 07/14: progressing, ongoing  Goal 5: The pt will effectively use compensatory speech strategies during structured speech tasks with 70% accuracy, mod cues. 07/14: progressing, ongoing   *goals extended through 07/22/22*    ST Assessment:  Pt pleasant and cooperative, agreeable to participate. Pt completed the [address] today to assess cognitive linguistic function, scoring a 23/25 (writing portion omitted) Pt missed 2 of the 5 words to be recalled after a delay, but improved to 5.5 given category cue. Pt tolerated vital stim (NMES) at 7.5mA for 30 mins completing OME's x25 tolerating without any adverse reactions or side effects. Pt completed 10+ word sentences with 90% intelligibility. Pt continues to demonstrate improved with structured speech tasks and use of strategies, but min-mod cues required for consistent carryover of strategies within conversation. Pt is making good progress towards all goals and remains motivated to improve. Continue goals above. NUTRITION  Weight: 146 lb (66.2 kg) / Body mass index is 18.75 kg/m². Diet Order: ADULT DIET; Regular  ADULT ORAL NUTRITION SUPPLEMENT; Breakfast, Dinner; Standard High Calorie/High Protein Oral Supplement  PO Meals Eaten (%): 76 - 100%  Education: No recommendation       CASE MANAGEMENT  Assessment: 60 yr old male. Dx:Acute CVA (cerebrovascular accident). Independent PLOF. Lives with brother in a 2 level home with a level entry into home from the back. Pt has rolling walker, cane and manual wc at home.  Therapy recommendations are 24 hour supervision or assist;Patient would benefit from continued therapy after discharge outpatient therapy. DME for discharge tub transfer bench. Interdisciplinary Goals:   1.) Pt will indep implement use of speech strategies for improved intelligibility across all disciplines   2.) pt will complete x200ft gait with Mod I  3.) Pt will complete toileting with mod I.      []  Family Training discussed at conference and to be scheduled. Discharge Plan   Estimated discharge date: 7/22/2022  Destination: Home with home health  Pass:No   Services at Discharge: Home health PT/OT/ST and nursing. Equipment at Discharge: Shower chair, and RW with clam shell    Team Members Present at Conference:  : Fabien Martinez RN    Occupational Therapist: Eryn Fong, OTR/L  Physical Therapist: Radha James SPT, Alexus Quinonez PT, DPT  Speech Therapist: Giuseppe Jurado, [facility] SLP   Nurse: Wenceslao Madrigal RN  Dietician: Jillian Jones RD, LD  : Littie Hammans, OTR/L  Psychiatry: N/A    Family members present at conference: No      I led this team conference and I approve the established interdisciplinary plan of care as documented within the medical record of [facility].     MD: Electronically signed by Janine Urbina MD on 7/20/2022 at 12:16 PM

## 2022-07-19 NOTE — DISCHARGE INSTR - COC
Continuity of Care Form    Patient Name: Guy Balderas   :  1960  MRN:  [id number]    Admit date:  2022  Discharge date:  2022    Code Status Order: Full Code   Advance Directives:     Admitting Physician:  Paulo Self MD  PCP: Coy Santos    Discharging Nurse: Antonio Romero Unit/Room#: 3874/2584-05  Discharging Unit Phone Number: [phone]    Emergency Contact:   Extended Emergency Contact Information  Primary Emergency Contact: [address]   Mobile Phone: [phone]  Relation: Brother/Sister  Secondary Emergency Contact: Ary Bernal  Relation: Parent    Past Surgical History:  No past surgical history on file. Immunization History: There is no immunization history on file for this patient. Active Problems:  Patient Active Problem List   Diagnosis Code    Acute CVA (cerebrovascular accident) ([facility] [address] I63.9    Moderate malnutrition ([facility] [address] E44.0       Isolation/Infection:   Isolation            No Isolation          Patient Infection Status       None to display            Nurse Assessment:  Last Vital Signs: /71   Pulse 93   Temp 97.4 °F (36.3 °C) (Oral)   Resp 16   Ht 6' 2\" (1.88 m)   Wt 146 lb 5.7 oz (66.4 kg)   SpO2 93%   BMI 18.79 kg/m²     Last documented pain score (0-10 scale): Pain Level: 0  Last Weight:   Wt Readings from Last 1 Encounters:   22 146 lb 5.7 oz (66.4 kg)     Mental Status:  oriented, alert, coherent, logical, thought processes intact, and able to concentrate and follow conversation    IV Access:  - None    Nursing Mobility/ADLs:  Walking   Independent  Transfer  Independent  Bathing  Independent  Dressing  Independent  Toileting  Independent  Feeding   [facility] Delivery   whole    Wound Care Documentation and Therapy:        Elimination:  Continence:    Bowel: Yes  Bladder: Yes  Urinary Catheter: None   Colostomy/Ileostomy/Ileal Conduit: No       Date of Last BM: 7/22/22    Intake/Output Summary (Last 24 hours) at 7/19/2022 1212  Last data filed at 7/19/2022 0909  Gross per 24 hour   Intake 780 ml   Output --   Net 780 ml     I/O last 3 completed shifts: In: 5 [P.O.:840]  Out: -     Safety Concerns: At Risk for Falls    Impairments/Disabilities:      Speech and Vision    Nutrition Therapy:  Current Nutrition Therapy:   - Oral Diet:  General    Routes of Feeding: Oral  Liquids: Thin Liquids  Daily Fluid Restriction: no  Last Modified Barium Swallow with Video (Video Swallowing Test): not done    Treatments at the Time of Hospital Discharge:   Respiratory Treatments:   Oxygen Therapy:  is not on home oxygen therapy. Ventilator:    - No ventilator support    Rehab Therapies:   Weight Bearing Status/Restrictions: No weight bearing restrictions  Other Medical Equipment (for information only, NOT a DME order):     Other Treatments:     Patient's personal belongings (please select all that are sent with patient):  None    RN SIGNATURE:  Electronically signed by Torres Samuel RN on 7/22/22 at 2:45 PM EDT    CASE MANAGEMENT/SOCIAL WORK SECTION    Inpatient Status Date: 06/29/2022    Readmission Risk Assessment Score:  Readmission Risk              Risk of Unplanned Readmission:  [id number]         Discharging to Facility/ [place] [address]   [address] [facility]   [phone]    / signature: Electronically signed by Nancy Huffman RN on 7/22/22 at 10:05 AM EDT    PHYSICIAN SECTION    Prognosis: Good    Condition at Discharge: Stable    Rehab Potential (if transferring to Rehab): Good    Recommended Labs or Other Treatments After Discharge:   - Follow up with your family doctor and the Stroke clinic  - continue aspirin and statin  - No driving until cleared by a physician    Physician Certification: I certify the above information and transfer of Buell Lanes is necessary for the continuing treatment of the diagnosis listed and that he requires Home Care for less 30 days.      Update Admission H&P: No change in H&P    PHYSICIAN SIGNATURE:  Electronically signed by Jennifer Kaminski MD on 7/21/22 at 8:50 PM EDT

## 2022-07-19 NOTE — PROGRESS NOTES
Speech Language Pathology  [facility]: ACUTE REHAB UNIT  SPEECH-LANGUAGE PATHOLOGY      [x] Daily  [] Weekly Care Conference Note  [] Discharge    Keri Taylor      :0[birth year]  FLL:[id number]  Rehab Dx/Hx: Acute CVA (cerebrovascular accident) ([facility] [address] [I63.9]    Precautions: falls  Home situation: lives with brother, manages own finances, did not take any meds PTA, active   ST Dx: [] Aphasia  [x] Dysarthria  [] Apraxia   [] Oropharyngeal dysphagia [x] Cognitive Impairment  [] Other:   Date of Admit: 6/29/2022  Room #: 0166/0166-01    Current functional status (updated daily):         Pt being seen for : [x] Speech/Language Treatment  [] Dysphagia Treatment [x] Cognitive Treatment  [] Other:  Communication: []WFL  [] Aphasia  [x] Dysarthria  [] Apraxia  [] Pragmatic Impairment [] Non-verbal  [] Hearing Loss  [] Other:   Cognition: [] WFL  [x] Mild  [] Moderate  [] Severe [] Unable to Assess  [] Other:  Memory: [] WFL  [x] Mild  [] Moderate  [] Severe [] Unable to Assess  [] Other:  Behavior: [x] Alert  [x] Cooperative  [x]  Pleasant  [] Confused  [] Agitated  [] Uncooperative  [] Distractible [] Motivated  [] Self-Limiting [] Anxious  [] Other:  Endurance:  [x] Adequate for participation in SLP sessions  [] Reduced overall  [] Lethargic  [] Other:  Safety: [x] No concerns at this time  [] Reduced insight into deficits  []  Reduced safety awareness [] Not following call light procedures  [] Unable to Assess  [] Other:    Current Diet Order:ADULT DIET; Regular  ADULT ORAL NUTRITION SUPPLEMENT; Breakfast, Dinner; Standard High Calorie/High Protein Oral Supplement  Swallowing Precautions: upright for all intake, stay upright for at least 30 mins after intake, small bites/sips, set-up assistance / distant supervision with intake, alternate bites/sips, slow rate of intake, general aspiration precautions and hold PO and contact SLP if s/s of aspiration or worsening respiratory status develop. acc  -other executive function tasks were not targeted d/t pt's inability to write with dominant hand     Goal 3: The pt will complete high-level problem-solving tasks (*particularly related to safety scenarios) with 95% accuracy, no cues. Did not target. Goal 4: The pt will complete articulatory agility tasks with 70% accuracy, mod cues. Reading aloud 10+ word sentences:  -90% acc indep improving to 100% acc given min cues for slow rate. Pt indep implemented use of over articulation       Goal 5: The pt will effectively use compensatory speech strategies during structured speech tasks with 70% accuracy, mod cues. See goal 4 above. Other areas targeted: MOCA:  -23/25 (writing portion omitted)      Education:    [facility] provided results of [facility], speech strategies for improved intelligibility       Safety Devices: [x] Call light within reach  [x] Chair alarm activated  [] Bed alarm activated  [] Other: [] Call light within reach  [] Chair alarm activated  [] Bed alarm activated  [] Other:    Assessment: Pt pleasant and cooperative, agreeable to participate. Pt completed the [facility] today to assess cognitive linguistic function, scoring a 23/25 (writing portion omitted) Pt missed 2 of the 5 words to be recalled after a delay, but improved to 5.5 given category cue. Pt tolerated vital stim (NMES) at 7.5mA for 30 mins completing OME's x25 tolerating without any adverse reactions or side effects. Pt completed 10+ word sentences with 90% intelligibility. Pt continues to demonstrate improved with structured speech tasks and use of strategies, but min-mod cues required for consistent carryover of strategies within conversation. Pt is making good progress towards all goals and remains motivated to improve. Continue goals above. Plan: Continue as per plan of care.       Additional Information:     Barriers toward progress: None  Discharge recommendations:  [] Home independently  [x] Home with assistance []  24 hour supervision  [] ECF [] Other:  Continued Tx Upon Discharge: ? [x] Yes [] No [] TBD based on progress while on ARU [] Vital Stim indicated [] Other:   Estimated discharge date: 07/22/22    Interventions used this date:  [] Speech/Language Treatment  [] Instruction in HEP [] Group [] Dysphagia Treatment [x] Cognitive Treatment   [] Other: Total Time Breakdown / Charges    Time in Time out Total Time / units   Cognitive Tx 0900 0930 30 min/ 2 units    Speech Tx 0930 1000 30 min/ 1 unit    Dysphagia Tx -- -- --     Electronically Signed by     Jolinda Landau. A [facility]3]-SLP  Speech-Language Pathologist  [phone]

## 2022-07-19 NOTE — PROGRESS NOTES
Occupational Therapy  Facility/Department: [address] [place] [address]  Rehabilitation Occupational Therapy Daily Treatment Note    Date: 22  Patient Name: Zeus Johnson       Room: [facility]  MRN: [id number]  Account: [de-identified]   : 1960  (63 y.o.) Gender: male                    Past Medical History:  has a past medical history of CVA (cerebral vascular accident) ([facility] [address] Past Surgical History:   has no past surgical history on file. Restrictions  Restrictions/Precautions: Fall Risk;General Precautions  Other position/activity restrictions: R sided weakness; up with assist    Subjective  Subjective: Pt in bed, feeling better than this AM with PT, no pain, agreeable to OT session. /77, HR 94, O2 sats 99% on RA. Restrictions/Precautions: Fall Risk;General Precautions             Objective     Cognition  Overall Cognitive Status: Exceptions  Arousal/Alertness: Appropriate responses to stimuli  Following Commands: Follows multistep commands consistently; Follows multistep commands with repitition  Attention Span: Attends with cues to redirect  Memory: Appears intact  Safety Judgement: Decreased awareness of need for safety  Problem Solving: Assistance required to generate solutions;Assistance required to identify errors made  Insights: Decreased awareness of deficits  Initiation: Does not require cues  Sequencing: Requires cues for some  Orientation  Overall Orientation Status: Within Functional Limits   Perception  Overall Perceptual Status: Impaired  Unilateral Attention: Cues to attend right visual field  Initiation: Appears intact  Motor Planning: Appears intact  Perseveration: Not present     ADL          Electrical Stimulation     Electrical Stimulation Location Right; Hand; Wrist   Electrical Stimulation Action Bioness: Program G for 5 minutes, Program D for x7 [facility] then removed for remainder of task   Electrical Stimulation Parameters Extension-28mA, Flexion- 21mA, thenar flexion- 22mA   Electrical Stimulation Goals Neuromuscular Facilitation; Strength        Functional Mobility  Activity: To/From therapy gym  Assistance Level: Stand by assist  Skilled Clinical Factors: no LOB with mobility  Bed Mobility  Overall Assistance Level: Modified Independent  Supine to Sit  Assistance Level: Modified independent  Transfers  Surface: To chair with arms;From chair with arms;From bed; To mat  Additional Factors: Set-up; Verbal cues; Increased time to complete;Hand placement cues  Device:  (no AD)  Sit to Stand  Assistance Level: Stand by assist  Stand to Sit  Assistance Level: Contact guard assist  Skilled Clinical Factors: to sit back on mat table after balance task  Bed To/From Chair  Technique: Stand step  Assistance Level: Supervision   Weight Bearing  Response To Weight Bearing Technique: weight bearing to RLE while standing on disco-sit on LLE and leaning to R when standing  OT Exercises  Dynamic Sitting Balance Exercises: x20 with 4# medicine ball for obliques, core rotation, and chest press     Assessment  Assessment  Assessment: Pt agreeable to OT session. Pt demonstrated improved grasp/release of RUE and improved elbow extension with cues. Pt assisted with grasp/release for start of task with e-stim but improved with grasp to not use e-stim and able to grasp/release well with cues to attention to task. Pt knocking over items a few times with RUE when not attending fully to hand. Pt performed weight bearing while standing to place LLE on disco-sit and leaning to R when reaching out of SERENE. Pt  stood for 2.5 minutes, 1 minute, 1 minute, and 2.5 minutes when transferring Squigz from R to L. Pt continues to require cues for safety and assist when sitting back on mat, up to mod A when losing balance on disco-sit, and decreased safety and insight. Continue POC.   Activity Tolerance: Patient tolerated treatment well  Discharge Recommendations: 24 hour supervision or assist;Outpatient OT  OT Equipment Recommendations  Equipment Needed: Yes  Mobility Devices: ADL Assistive Devices  ADL Assistive Devices: Reacher;Sock-Aid Hard;Elastic Shoe Laces;Grab Bars - tub;Transfer Tub Bench  Safety Devices  Safety Devices in place: Yes  Type of devices: All fall risk precautions in place; Patient at risk for falls;Gait belt;Nurse notified; Left in chair (left with PT)    Patient Education  Education  Education Given To: Patient  Education Provided: Role of Therapy; ADL Function;Equipment; Mobility Training;Transfer Training;Energy Conservation; Safety;Precautions;Plan of Care;DME/Home Modifications; Home Exercise Program;Visual Perceptual Function; Fall Prevention Strategies  Education Provided Comments: disease specific: ADL katlyn techniques, dynamic vs standing balance, safety with foot placement and clearance, NMR with e-stim, visual scanning, R sided awareness,  education/safety and restrictions, weight bearing on RLE when reaching across body  Education Method: Verbal;Demonstration  Barriers to Learning: Cognition  Education Outcome: Verbalized understanding;Continued education needed    Plan  Plan  Times per Week: 5 of 7 days  Current Treatment Recommendations: Strengthening;Balance training;Functional mobility training; Endurance training;Neuromuscular re-education;Positioning;Equipment evaluation, education, & procurement; Modalities; Patient/Caregiver education & training; Safety education & training;Self-Care / ADL; Home management training;Cognitive/Perceptual training;Coordination training;Sensory integraion    Goals  Short Term Goals  Time Frame for Short term goals: within 7 days by 7/6/22  Short Term Goal 1: Pt will perform toilet transfer with Mod A and LRAD- GOAL MET 7/11/22 Pt completed toilet transfer with CGA. Short Term Goal 2: Pt will perform LB dressing with Min A and AE PRN- GOAL MET 7/13/22 Pt performed LB dressing with min A.   Short Term Goal 3: Pt will perform UB dressing with Min A and appropriate katlyn techniques- GOAL MET 7/7  Short Term Goal 4: Pt will perform functional dynamic balance activity in stance for >5 minutes with Min A and LRAD- GOAL MET 7/6  Short Term Goal 5: Pt will perform 2-3 grooming tasks with Min A for during tasks standing vs sitting with LRAD and Min A for balance. GOAL MET 7/11/22 Pt completed grooming tasks with min A. Long Term Goals  Time Frame for Long term goals : within 21 days by 7/20/22  Long Term Goal 1: Pt will perform TB dressing Mod I  Long Term Goal 2: Pt will perfrom TB bathing Mod I and AE PRN  Long Term Goal 3: Pt will perform simple IADL task in stance with LRAD for >7 minutes with supervision. GOAL MET 7/15/22 Pt completed IADL task with supervision and stood greater than 7 minutes. Long Term Goal 4: Pt will self feed with R hand with Min A  Long Term Goal 5: Pt will increase Block and Box score on R hand by >4 blocks- goal progressing as of assessment 7/7 R hand = 1 block in 60 seconds. GOAL MET 7/14/22 Pt transferred 8 blocks in Box and Blocks assessment.       Therapy Time   Individual Concurrent Group Co-treatment   Time In 1230         Time Out 1330         Minutes 60         Timed Code Treatment Minutes: [address]

## 2022-07-19 NOTE — PROGRESS NOTES
Physical Therapy  Facility/Department: [facility]  Rehabilitation Physical Therapy Treatment Note    NAME: Rene Lake  : 1960 (60 y.o.)  MRN: [id number]  CODE STATUS: Full Code    Date of Service: 22       Restrictions:  Restrictions/Precautions: Fall Risk;General Precautions  Position Activity Restriction  Other position/activity restrictions: R sided weakness; up with assist     SUBJECTIVE  Subjective  Subjective: pt found sitting EOB this morning eating breakfast  Pain: pt reports general aches today            OBJECTIVE  Cognition  Arousal/Alertness: Appropriate responses to stimuli  Following Commands: Follows multistep commands consistently; Follows multistep commands with repitition  Attention Span: Attends with cues to redirect  Memory: Appears intact  Safety Judgement: Decreased awareness of need for safety  Problem Solving: Assistance required to generate solutions;Assistance required to identify errors made  Insights: Decreased awareness of deficits  Initiation: Does not require cues  Sequencing: Requires cues for some  Cognition Comment: impulsive, cues to attend to R side of body  Orientation  Overall Orientation Status: Within Functional Limits  Orientation Level: Oriented to time;Oriented to situation;Oriented to person;Oriented to place    Functional Mobility     Pt found sitting EOB this AM. Vitals assessed. STS from EOB to RW with SBA. Pt states that its the first time he has been up this morning and that he needs some time to get moving. Pt also notes discomfort in R knee upon standing. PT dons ace wrap around R knee for comfort     X100ft gait to therapy gym with SBA. Pt initially demos improved gait mechanics with slight limp on R side. However, pt regresses and demos significant limp by end of gait with reduced heel strike, stance time, step length, and height on R. Pt remains standing for 5 min while PT dons knee flexion assist wrap.      X200ft+100ft gait around ARU with knee flexion assist wrap donned and SBA-CGA. Pt notes feeling good with the wrap on and that he does not feel his knee kicking back into extension any more. However, pt eventually reports a sharp pain in lateral R thigh with each stance phase on R. Pt notes feeling like his knee could buckle by the end. Pt has prolonged seated rest break and attempted again, pt notes feeling better initially but an increase in thigh pain by the end. X8 up and over 6 inch box in // bars with SBA. Pt cued to stand up completely on box and control his descent off of the box. Pt voices need for a break after 8 repetitions due to increased R thigh pain. Pt completes with alternating legs. X100ft gait back to room with SBA. Pt continues to be limited by R knee/thigh discomfort. Pt continues to demo limping gait that worsens as gait bout progresses. Pt left in recliner with all needs within reach and call alarm in place. 2nd session:  Pt found in therapy gym with OT present. Pt notes that knee is feeling better but his R shoulder is sore following OT session. Pt completes short gait x50ft for PT to assess gait. PT notes reduced knee extension and limping throughout gait bout and pt states that it is still uncomfortable but not painful. Pt also voices absence of pain in thigh this afternoon. Dyn standing exercise with CGA: pt tosses large blue therapy ball to aide-> aide bounces ball back-> pt catches and repeats. Pt completes x10 catches and tosses. Pt is limited by R UE deficits in catching and tossing ball but pt is able to maintain balance and make compensations with L UE to allow him to be successful. Dyn standing exercise with SBA. Pt standing with neutral SERENE holding large blue therapy ball-> squat down and touch ball on ground-> stand up -> lift ball to shoulder height-> repeat. Pt completes x10 repetitions of above exercise.  Pt unnable to lift ball overhead today due to soreness in R shoulder but pt able (7/9/22)  Short term goal 1: Pt will perform bed mobility with min A  -7/08: goal met: SBA  Short term goal 2: Pt will perform transfers with min A  -7/08: goal met: CGA-Omero  Short term goal 3: Pt will ambulate 25' with quad cane and mod A  -7/05 Goal met  Short term goal 4: Pt will negotiate 4 steps with HR and mod A  -7/05 Goal met  Long Term Goals  Time Frame for Long term goals : 21 days (7/20/22)  Long term goal 1: Pt will perform bed mobility mod I  Long term goal 2: Pt will perform transfers with supervision  Long term goal 3: Pt will ambulate 48' with quad cane and CGA 7/05 update goal: Pt will ambulate 150' with RW and clamshell with supervision  Long term goal 4: Pt will propel wheelchair 150' mod I; 7/05 discontinue goal  Long term goal 5: Pt will negotiate 4 steps with HR and min A    PLAN OF CARE/SAFETY  Plan  Plan:  minutes of therapy at least 5 out of 7 days a week  Plan weeks: 21 days  Specific Instructions for Next Treatment: R katlyn strength training and continue mobility progression  Current Treatment Recommendations: Strengthening;ROM;Balance training;Functional mobility training;Transfer training; Endurance training; Wheelchair mobility training;Gait training;Stair training;Neuromuscular re-education;Manual Therapy - Soft Tissue Mobilization;Patient/Caregiver education & training; Safety education & training;Home exercise program;Equipment evaluation, education, & procurement; Modalities; Therapeutic activities;ADL/Self-care training;IADL training  Safety Devices  Type of Devices: All fall risk precautions in place;Call light within reach; Chair alarm in place;Gait belt;Nurse notified; Left in chair;Patient at risk for falls  Restraints  Restraints Initially in Place: No    EDUCATION  Education  Education Given To: Patient  Education Provided: Role of Therapy; Safety;Equipment; Mobility Training; Fall Prevention Strategies;Transfer Training;Plan of Care;Precautions; Home Exercise Program  Education Provided Comments: educated pt on fall prevention strategies, no carryover  Education Method: Demonstration;Verbal  Barriers to Learning: Cognition  Education Outcome: Continued education needed        Therapy Time   Individual Concurrent Group Co-treatment   Time In 0800         Time Out 0830         Minutes 30           Timed Code Treatment Minutes: 30 Minutes     Second Session Therapy Time:   Individual Concurrent Group Co-treatment   Time In 1330         Time Out 1400         Minutes 30           Timed Code Treatment Minutes:  30    Total Treatment Minutes:   60  Bhavesh Median, 07/19/22 at 10:03 AM

## 2022-07-19 NOTE — PLAN OF CARE
Problem: Skin/Tissue Integrity  Goal: Absence of new skin breakdown  Description: 1. Monitor for areas of redness and/or skin breakdown  2. Assess vascular access sites hourly  3. Every 4-6 hours minimum:  Change oxygen saturation probe site  4. Every 4-6 hours:  If on nasal continuous positive airway pressure, respiratory therapy assess nares and determine need for appliance change or resting period.   7/19/2022 0949 by Jossie Monge RN  Outcome: Progressing Towards Goal     Problem: Safety - Adult  Goal: Free from fall injury  7/19/2022 0949 by Jossie Monge RN  Outcome: Progressing Towards Goal

## 2022-07-19 NOTE — PLAN OF CARE
Problem: Safety - Adult  Goal: Free from fall injury  7/19/2022 0034 by Yandy Botello RN  Outcome: Progressing Towards Goal  7/18/2022 1036 by Jermaine Arias RN  Outcome: Progressing Towards Goal     Problem: Pain  Goal: Verbalizes/displays adequate comfort level or baseline comfort level  7/19/2022 0034 by Yandy Botello RN  Outcome: Progressing Towards Goal  7/18/2022 1036 by Jermaine Arias RN  Outcome: Progressing Towards Goal

## 2022-07-20 PROCEDURE — 6370000000 HC RX 637 (ALT 250 FOR IP): Performed by: STUDENT IN AN ORGANIZED HEALTH CARE EDUCATION/TRAINING PROGRAM

## 2022-07-20 PROCEDURE — 97112 NEUROMUSCULAR REEDUCATION: CPT

## 2022-07-20 PROCEDURE — 6360000002 HC RX W HCPCS: Performed by: STUDENT IN AN ORGANIZED HEALTH CARE EDUCATION/TRAINING PROGRAM

## 2022-07-20 PROCEDURE — 97530 THERAPEUTIC ACTIVITIES: CPT

## 2022-07-20 PROCEDURE — 97129 THER IVNTJ 1ST 15 MIN: CPT

## 2022-07-20 PROCEDURE — 92507 TX SP LANG VOICE COMM INDIV: CPT

## 2022-07-20 PROCEDURE — 97130 THER IVNTJ EA ADDL 15 MIN: CPT

## 2022-07-20 PROCEDURE — 97116 GAIT TRAINING THERAPY: CPT

## 2022-07-20 PROCEDURE — 1280000000 HC REHAB R&B

## 2022-07-20 PROCEDURE — 97110 THERAPEUTIC EXERCISES: CPT

## 2022-07-20 RX ADMIN — POLYVINYL ALCOHOL 2 DROP: 14 SOLUTION/ DROPS OPHTHALMIC at 08:07

## 2022-07-20 RX ADMIN — ENOXAPARIN SODIUM 40 MG: 100 INJECTION SUBCUTANEOUS at 08:04

## 2022-07-20 RX ADMIN — POLYVINYL ALCOHOL 2 DROP: 14 SOLUTION/ DROPS OPHTHALMIC at 20:22

## 2022-07-20 RX ADMIN — TRAZODONE HYDROCHLORIDE 50 MG: 50 TABLET ORAL at 20:22

## 2022-07-20 RX ADMIN — ASPIRIN 81 MG: 81 TABLET, CHEWABLE ORAL at 08:05

## 2022-07-20 RX ADMIN — ATORVASTATIN CALCIUM 10 MG: 10 TABLET, FILM COATED ORAL at 20:22

## 2022-07-20 ASSESSMENT — PAIN DESCRIPTION - PAIN TYPE: TYPE: CHRONIC PAIN

## 2022-07-20 ASSESSMENT — PAIN SCALES - GENERAL: PAINLEVEL_OUTOF10: 1

## 2022-07-20 ASSESSMENT — PAIN DESCRIPTION - ORIENTATION: ORIENTATION: RIGHT

## 2022-07-20 ASSESSMENT — PAIN - FUNCTIONAL ASSESSMENT: PAIN_FUNCTIONAL_ASSESSMENT: ACTIVITIES ARE NOT PREVENTED

## 2022-07-20 ASSESSMENT — PAIN DESCRIPTION - LOCATION: LOCATION: SHOULDER

## 2022-07-20 NOTE — PLAN OF CARE
Problem: Safety - Adult  Goal: Free from fall injury  7/19/2022 2328 by Chris Dutta RN  Outcome: Progressing  7/19/2022 0949 by Rossy Mathew RN  Outcome: Progressing     Problem: Pain  Goal: Verbalizes/displays adequate comfort level or baseline comfort level  Outcome: Progressing     Problem: Nutrition Deficit:  Goal: Optimize nutritional status  Outcome: Progressing

## 2022-07-20 NOTE — PROGRESS NOTES
Miladys Morejon  7/20/2022  [id number]    Chief Complaint: Acute CVA (cerebrovascular accident) [facility])    Subjective:   No acute events overnight. Today Zoila Caputo is seen in the gym working with therapy. He reports that he is feeling well and denies acute complaints at this time. ROS: denies f/c, n/v, cp, sob    Objective:  Patient Vitals for the past 24 hrs:   BP Temp Temp src Pulse Resp SpO2 Weight   07/20/22 1221 112/76 -- -- 90 -- 97 % --   07/20/22 0730 112/76 97.3 °F (36.3 °C) Oral 90 18 97 % 146 lb (66.2 kg)   07/19/22 2045 122/84 97.5 °F (36.4 °C) Oral 83 16 97 % --     Gen: No distress, pleasant. HEENT: Normocephalic, atraumatic. CV: extremities well perfused  Resp: no respiratory distress  Abd: Soft, nontender   Ext: No edema. Neuro: Alert, oriented, appropriately interactive. Right hemiparesis, dysarthria. Remains standing without LOB    Wt Readings from Last 3 Encounters:   07/20/22 146 lb (66.2 kg)   02/14/22 145 lb (65.8 kg)       Laboratory data:   Lab Results   Component Value Date    WBC 5.5 07/18/2022    HGB 15.4 07/18/2022    HCT 45.5 07/18/2022    .9 (H) 07/18/2022     07/18/2022       Lab Results   Component Value Date/Time     07/18/2022 08:47 AM    K 4.0 07/18/2022 08:47 AM    CL 97 07/18/2022 08:47 AM    CO2 28 07/18/2022 08:47 AM    BUN 14 07/18/2022 08:47 AM    CREATININE 0.8 07/18/2022 08:47 AM    GLUCOSE 164 07/18/2022 08:47 AM    CALCIUM 10.6 07/18/2022 08:47 AM        Therapy progress:  PT  Position Activity Restriction  Other position/activity restrictions: R sided weakness; up with assist  Objective     Sit to Stand:  Moderate Assistance  Stand to sit: Moderate Assistance  Bed to Chair: Moderate assistance (to L with cues for safety)  Device: Parallel Bars  Assistance: Minimal assistance  Distance: 8' and 20'  OT  PT Equipment Recommendations  Equipment Needed: No  Toilet - Technique: Ambulating  Equipment Used: Standard toilet (with L grab bar)  Toilet Transfers Comments: cues for safe hand placement; decreased proprioception when going to initate sit down to toilet   Assessment        SLP  Current Diet : Soft and Bite-Sized             Body mass index is 18.75 kg/m². Assessment and Plan:  Julien Leon is a 60year old male with a past medical history significant for left sided stroke due to ICA dissection (2018) without residual deficits, HLD, and nicotine dependence who presented to  on 6/26/22 with acute onset of right face, arm, and leg weakness, found to have acute CVA. He was admitted to [facility] on 6/29/22 due to functional deficits below his baseline. 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 Follow bowel movements. Enema or suppository if needed. Bladder: Check PVR x 3. [facility] if PVR > 200ml or if any volume is > 500 ml. Sleep: Trazodone provided prn. Follow up appointments: PCP, Stroke clinic    Services: home health PT, OT, ST  EDOD: 7/22/22    Interdisciplinary team conference was held today with entire rehab treatment team including PT, OT, SLP, Dietician, RN, and SW. Discussion focused on progress toward rehab goals and discharge planning. Barriers: endurance, pain, comorbidities. Total treatment time >35 min with greater than 50% spent in care coordination. [facility]  Forest Hernandez MD 7/20/2022, 6:41 PM

## 2022-07-20 NOTE — PROGRESS NOTES
Physical Therapy  Facility/Department: [facility]  Rehabilitation Physical Therapy Treatment Note    NAME: Marcial Jessica  : 1960 (60 y.o.)  MRN: [id number]  CODE STATUS: Full Code    Date of Service: 22       Restrictions:  Restrictions/Precautions: Fall Risk;General Precautions  Position Activity Restriction  Other position/activity restrictions: R sided weakness; up with assist     SUBJECTIVE  Subjective  Subjective: pt found sitting EOB this morning  Pain: pt reports general aches today          OBJECTIVE  Cognition  Arousal/Alertness: Appropriate responses to stimuli  Following Commands: Follows multistep commands consistently; Follows multistep commands with repitition  Attention Span: Attends with cues to redirect  Memory: Appears intact  Safety Judgement: Decreased awareness of need for safety  Problem Solving: Assistance required to generate solutions;Assistance required to identify errors made  Insights: Decreased awareness of deficits  Initiation: Does not require cues  Sequencing: Requires cues for some  Cognition Comment: impulsive, cues to attend to R side of body  Orientation  Overall Orientation Status: Within Functional Limits  Orientation Level: Oriented to time;Oriented to situation;Oriented to person;Oriented to place    Functional Mobility     Pt found sitting EOB this AM. Pt vitals assessed. STS from EOB with SBA. Pt maintains standing balance while PT dons ace wrap to R knee and dorsiflexion, knee flexion, and hip flexion assist wrap. Pt voices improved comfort with wraps applied. X300ft+ 300ft gait to lobby and back w/o AD and supv. Pt continues to show lack of awareness to R side today but is able to avoid any collisions with walls or furniture with cuing. Pt notes improved tolerance to gait with assist wraps donned. Pt demos improved step length and stance time on R LE during gait today. Cues provided for heel strike and increased step height today.      In therapy gym, 3lb ankle weights donned. Pt completes standing there ex w/o UE support and CGA: including side stepping, marching, and calf raises, and HS curls. Pt initially takes several moments to establish balance for each exercise but with repetition is able to increase pace and control. Pt completes x1 min of each intervention today. X10 STS from standard chair. Pt cued to avoid use of Ues with STS and to establish standing balance before returning to seated. Pt begins to plop into chair and PT provides cuing for slow and controlled descent back into sitting. X12 stairs ascent/descent with HR support and SBA. Pt completes with step to pattern. Pt demos good awareness with slowed, purposeful movement throughout. When descending pt requires cues to control foot placement of R LE. Standing at stairs, pt completes tap ups onto step with alternating legs SBA and 1 HR support . Pt cued to get foot completely on step and back off without scuffing. Pt has greater difficulty with R  LE as he lacks strength and control to lift it off of step. Standing at stairs with one foot on step and other on floor. Pt completes step through onto next step with lower foot and then back to the ground. Pt completes x10 repetitions on both legs. Pt cued to stand up tall and to control foot placement on swing. Pt notes increased difficulty when stepping through with R LE today. Holding 4lb theraball in B UE, pt completes x10 squat to tap ball on 6 inch step and back up into tall, upright posture. Pt requires SBA. Pt cued to go slow and focus on posture throughout. X150ft gait back to room with supv-SBA. Pt still has assist wraps donned. Pt continues to demo improved gait mechanics. Cues provided for improved heel strike and quiet steps as pt tends to stomp feet. Pt left sitting EOB with all needs within reach.      ASSESSMENT/PROGRESS TOWARDS GOALS  Vital Signs  Heart Rate: 90  BP: 112/76  BP Location: Right upper arm  MAP (Calculated): 88  SpO2: 97 %  O2 Device: None (Room air)    Assessment  Assessment: pt seen this AM by PT in order to address deficits in gait, transfers, balance, and overal mobiltiy. pt donned with ACE wrap around knee and knee flexion assist wrap prior to start of session. pt notes significant improvement in mobiltiy and states that it makes his knee feel much better. pt demos signficant improvement in gait mechanics as pt is better able to ambulate with symmetrical step lengths and w/o hyperextension or limping on the R. pt able to ambulate 300ft+300ft with SBA, and STS with supv-SBA. PT rec DC home with HHPT vs OPPT at this time.   Activity Tolerance: Patient tolerated treatment well  Discharge Recommendations: 24 hour supervision or assist;Patient would benefit from continued therapy after discharge  PT Equipment Recommendations  Equipment Needed: No    Goals  Patient Goals   Patient goals : \"to be able to walk again and use my R arm\"  Short Term Goals  Time Frame for Short term goals: 10 days (7/9/22)  Short term goal 1: Pt will perform bed mobility with min A  -7/08: goal met: SBA  Short term goal 2: Pt will perform transfers with min A  -7/08: goal met: CGA-Omero  Short term goal 3: Pt will ambulate 25' with quad cane and mod A  -7/05 Goal met  Short term goal 4: Pt will negotiate 4 steps with HR and mod A  -7/05 Goal met  Long Term Goals  Time Frame for Long term goals : 21 days (7/20/22)  Long term goal 1: Pt will perform bed mobility mod I- GOAL MET  Long term goal 2: Pt will perform transfers with supervision- GOAL NOT MET- pt at times requires SBA for transfers due to instability  Long term goal 3: Pt will ambulate 48' with quad cane and CGA 7/05 update goal: Pt will ambulate 150' with RW and clamshell with supervision: GOAL MET- pt able to ambulate up to 500ft without AD  Long term goal 4: Pt will propel wheelchair 150' mod I; 7/05 discontinue goal  Long term goal 5: Pt will negotiate 4 steps with HR and min A- GOAL MET, pt ascends x12 steps with SBA and HR support    PLAN OF CARE/SAFETY  Plan  Plan:  minutes of therapy at least 5 out of 7 days a week  Plan weeks: 21 days  Specific Instructions for Next Treatment: R katlyn strength training and continue mobility progression  Current Treatment Recommendations: Strengthening;ROM;Balance training;Functional mobility training;Transfer training; Endurance training; Wheelchair mobility training;Gait training;Stair training;Neuromuscular re-education;Manual Therapy - Soft Tissue Mobilization;Patient/Caregiver education & training; Safety education & training;Home exercise program;Equipment evaluation, education, & procurement; Modalities; Therapeutic activities;ADL/Self-care training;IADL training  Safety Devices  Type of Devices: All fall risk precautions in place;Call light within reach; Chair alarm in place;Gait belt;Nurse notified; Left in chair;Patient at risk for falls  Restraints  Restraints Initially in Place: No    EDUCATION  Education  Education Given To: Patient  Education Provided: Role of Therapy; Safety;Equipment; Mobility Training; Fall Prevention Strategies;Transfer Training;Plan of Care;Precautions; Home Exercise Program  Education Provided Comments: educated pt on fall prevention strategies, no carryover  Education Method: Demonstration;Verbal  Barriers to Learning: Cognition  Education Outcome: Continued education needed        Therapy Time   Individual Concurrent Group Co-treatment   Time In 1000         Time Out 1100         Minutes 60           Timed Code Treatment Minutes: [id number] [facility], 21 [facility], 07/20/22 at 12:38 PM

## 2022-07-20 NOTE — PROGRESS NOTES
Occupational Therapy  Facility/Department: [facility]  Rehabilitation Occupational Therapy Daily Treatment Note    Date: 22  Patient Name: Madison Rojo       Room: 4247/5295-74  MRN: [id number]  Account: [de-identified]   : 1960  (63 y.o.) Gender: male      Past Medical History:  has a past medical history of CVA (cerebral vascular accident) ([address] Past Surgical History:   has no past surgical history on file. Restrictions  Restrictions/Precautions: Fall Risk;General Precautions  Other position/activity restrictions: R sided weakness; up with assist    Subjective  Subjective: Pt in bed upon entry, denies pain, agreeable to OT session. /85, HR 90, O2 RA 97%. Restrictions/Precautions: Fall Risk;General Precautions     Objective   Cognition  Overall Cognitive Status: Exceptions  Arousal/Alertness: Appropriate responses to stimuli  Following Commands:  Follows multistep commands with repitition  Attention Span: Attends with cues to redirect  Memory: Appears intact  Safety Judgement: Decreased awareness of need for safety  Problem Solving: Assistance required to generate solutions;Assistance required to identify errors made  Insights: Decreased awareness of deficits  Initiation: Does not require cues  Sequencing: Requires cues for some  Cognition Comment: cues to attend to R side of body  Orientation  Overall Orientation Status: Within Functional Limits  Orientation Level: Oriented to time;Oriented to situation;Oriented to person;Oriented to place   Perception  Overall Perceptual Status: Impaired  Unilateral Attention: Cues to attend right visual field (during visual search activity)  Initiation: Appears intact  Motor Planning: Appears intact  Perseveration: Not present   Fine motor: improved finger flex/exten on RUE to grasp/release pegs without assist of estim     Functional Mobility  Device:  (no AD)  Activity: To/From therapy gym  Assistance Level: Stand by assist/occasional CGA  Skilled Clinical Factors: no LOB with mobility; cues during visual search task to attend to R side  Transfers  Surface: To chair with arms;From chair with arms;From bed; To mat; To bed  Additional Factors: Verbal cues; Increased time to complete  Device:  (no AD)  Sit to Stand  Assistance Level: Stand by assist  Stand to Sit  Assistance Level: Stand by assist  Skilled Clinical Factors: to sit back on mat following FM task   OT Exercises  Resistive Exercises: 3# x20 shoulder flexion to 90*, elbow flexion, chest press, rows, and side<>side BUE     Electrical Stimulation     Electrical Stimulation Location Right; Hand; Wrist   Electrical Stimulation Action Bioness: Program G for 5 minutes   Electrical Stimulation Parameters Extension-28mA, Flexion- 21mA, thenar flexion- 22mA   Electrical Stimulation Goals Neuromuscular Facilitation; Strength       Assessment  Assessment  Assessment: Pt agreeable to OT session. Pt progressed to 3# dowel rossi during UE therex to increase independence in ADLs, requiring 0-2 rest breaks to complete 20 reps each. Pt demonstrated improved grasp/release of R hand during fine motor task on this date. Pt able to  nine large pegs and place into foam holes to replicate a pattern while standing. Extra time and cueing was required to stop use of L hand to provide aid. Pt removed pegs from holes with R hand, grasping pegs in between the second and third digits. Pt required SBA to perform functional transfers. SBA/CGA and verbal cues required to complete visual search task around therapy gym to support attention to R side during mobility. Cont POC. Activity Tolerance: Patient tolerated treatment well  Discharge Recommendations: 24 hour supervision or assist;S Level 1  Safety Devices  Safety Devices in place: Yes  Type of devices: All fall risk precautions in place; Patient at risk for falls;Gait belt;Nurse notified; Bed alarm in place; Left in bed    Patient Education  Education  Education Given To: Patient  Education Provided: Role of Therapy; ADL Function;Equipment; Mobility Training;Transfer Training;Energy Conservation; Safety;Precautions;Plan of Care;DME/Home Modifications; Home Exercise Program;Visual Perceptual Function; Fall Prevention Strategies  Education Provided Comments: disease specific: ADL katlyn techniques, dynamic vs standing balance, safety with foot placement and clearance, NMR with e-stim, visual scanning, R sided awareness  Education Method: Verbal;Demonstration  Barriers to Learning: Cognition  Education Outcome: Verbalized understanding;Continued education needed    Plan  Plan  Times per Week: 5 of 7 days  Times per Day: Daily  Current Treatment Recommendations: Strengthening;Balance training;Functional mobility training; Endurance training;Neuromuscular re-education;Positioning;Equipment evaluation, education, & procurement; Modalities; Patient/Caregiver education & training; Safety education & training;Self-Care / ADL; Home management training;Cognitive/Perceptual training;Coordination training;Sensory integraion    Goals  Patient Goals   Patient goals : \"to get back to normal\"  Short Term Goals  Time Frame for Short term goals: within 7 days by 7/6/22  Short Term Goal 1: Pt will perform toilet transfer with Mod A and LRAD- GOAL MET 7/11/22 Pt completed toilet transfer with CGA. Short Term Goal 2: Pt will perform LB dressing with Min A and AE PRN- GOAL MET 7/13/22 Pt performed LB dressing with min A. Short Term Goal 3: Pt will perform UB dressing with Min A and appropriate katlyn techniques- GOAL MET 7/7  Short Term Goal 4: Pt will perform functional dynamic balance activity in stance for >5 minutes with Min A and LRAD- GOAL MET 7/6  Short Term Goal 5: Pt will perform 2-3 grooming tasks with Min A for during tasks standing vs sitting with LRAD and Min A for balance. GOAL MET 7/11/22 Pt completed grooming tasks with min A.   Long Term Goals  Time Frame for Long term goals : within 21 days by 7/20/22 - cont to d/c date to 7/22  Long Term Goal 1: Pt will perform TB dressing Mod I  Long Term Goal 2: Pt will perfrom TB bathing Mod I and AE PRN  Long Term Goal 3: Pt will perform simple IADL task in stance with LRAD for >7 minutes with supervision. GOAL MET 7/15/22 Pt completed IADL task with supervision and stood greater than 7 minutes. Long Term Goal 4: Pt will self feed with R hand with Min A  Long Term Goal 5: Pt will increase Block and Box score on R hand by >4 blocks- goal progressing as of assessment 7/7 R hand = 1 block in 60 seconds. GOAL MET 7/14/22 Pt transferred 8 blocks in Box and Blocks assessment.       Therapy Time   Individual Concurrent Group Co-treatment   Time In 1230         Time Out [address]son, OTR/L

## 2022-07-20 NOTE — PROGRESS NOTES
Speech Language Pathology  [facility]: ACUTE REHAB UNIT  SPEECH-LANGUAGE PATHOLOGY      [x] Daily  [] Weekly Care Conference Note  [] Discharge    Isaiah Allen      SJK:8/77[birth year]  CLV:[id number]  Rehab Dx/Hx: Acute CVA (cerebrovascular accident) ([facility] [address] [I63.9]    Precautions: falls  Home situation: lives with brother, manages own finances, did not take any meds PTA, active   ST Dx: [] Aphasia  [x] Dysarthria  [] Apraxia   [] Oropharyngeal dysphagia [x] Cognitive Impairment  [] Other:   Date of Admit: 6/29/2022  Room #: 0166/0166-01    Current functional status (updated daily):         Pt being seen for : [x] Speech/Language Treatment  [] Dysphagia Treatment [x] Cognitive Treatment  [] Other:  Communication: []WFL  [] Aphasia  [x] Dysarthria  [] Apraxia  [] Pragmatic Impairment [] Non-verbal  [] Hearing Loss  [] Other:   Cognition: [] WFL  [x] Mild  [] Moderate  [] Severe [] Unable to Assess  [] Other:  Memory: [] WFL  [x] Mild  [] Moderate  [] Severe [] Unable to Assess  [] Other:  Behavior: [x] Alert  [x] Cooperative  [x]  Pleasant  [] Confused  [] Agitated  [] Uncooperative  [] Distractible [] Motivated  [] Self-Limiting [] Anxious  [] Other:  Endurance:  [x] Adequate for participation in SLP sessions  [] Reduced overall  [] Lethargic  [] Other:  Safety: [x] No concerns at this time  [] Reduced insight into deficits  []  Reduced safety awareness [] Not following call light procedures  [] Unable to Assess  [] Other:    Current Diet Order:ADULT DIET; Regular  ADULT ORAL NUTRITION SUPPLEMENT; Breakfast, Dinner; Standard High Calorie/High Protein Oral Supplement  Swallowing Precautions: upright for all intake, stay upright for at least 30 mins after intake, small bites/sips, set-up assistance / distant supervision with intake, alternate bites/sips, slow rate of intake, general aspiration precautions and hold PO and contact SLP if s/s of aspiration or worsening respiratory status develop. Date: 7/20/2022      Tx session 1  [id number] Tx session 2  [id number]   Total Timed Code Min 30 0   Total Treatment Minutes 30 30   Individual Treatment Minutes 30 30   Group Treatment Minutes 0 0   Co-Treat Minutes 0 0   Variance/Reason:  0 0   Pain Denies    Pain Intervention [] RN notified  [] Repositioned  [] Intervention offered and patient declined  [x] N/A  [] Other: [] RN notified  [] Repositioned  [] Intervention offered and patient declined  [] N/A  [] Other:   Subjective     Pt alert and oriented, cooperative and agreeable to participate in therapy. Pt seen sitting upright in bedside chair. Pt alert and oriented, cooperative and agreeable to participate in therapy. Pt seen sitting upright in bedside chair. Objective:  Goals     Dysphagia Goals:   Goal met 07/14/22. Pt is tolerating IDDSI 7 Regular, IDDSI 0 Thin, meds whole in puree as LRD. Goal met 07/14/22. Pt is tolerating IDDSI 7 Regular, IDDSI 0 Thin, meds whole in puree as LRD. Goal met 07/14/22. Goal met 07/14/22. Goal met 07/12/22. FEES completed. See separate speech therapy note for details. Goal met 07/12/22. Goal met 07/14/22. Goal met 07/14/22. *New goal added 07/01/22*  Goal 5. The pt will complete OME's for improved oral motor function, coordination, strength, and ROM in 10/10 trials, min cues. Did not target. OME's completed with vital stim (NMES) in place:  -placement 4a at 9.5mA for 20 mins   -pt tolerated without any adverse reactions or side effects  -smile: x30  -pucker: x30  -smile/pucker: x30  -cheek puff hold for 5 secs: x30  -cheek/eye squint: x30  -open wide: x30  -pucker to R side: x30     Speech/Lang/Cog goals:  Short-term Goals  Timeframe for Short-term *goals extended through 07/22/22*    Goal 1: The pt will complete graded recall tasks with 90% accuracy, min-no cues. Did not target. Did not target.     Goal 2: The pt will complete executive function tasks (i.e. planning, deductive reasoning, inferential, functional organization tasks) with 90% accuracy no cues. Time word problems:  -100% acc indep    Med management:   -will receive meds from [facility] (currently only taking two meds)  -discussed daily routine to recall taking medications, pt able to verbalize understanding   Did not target. Goal 3: The pt will complete high-level problem-solving tasks (*particularly related to safety scenarios) with 95% accuracy, no cues. Discussed use of life alert when pt returns home.  to provide paperwork to pt. Pt reported he isn't sure if it will work at his home or not because of cellular service, but is willing to try. Also discussed completing drivers evaluation at [facility],  to provide information to pt about this as well. Finance management: pt pays bills over the phone or goes to the bank to withdraw money, brother will be able to assist with this when pt returns home. Did not target. Goal 4: The pt will complete articulatory agility tasks with 70% accuracy, mod cues. Did not target. Did not target. Goal 5: The pt will effectively use compensatory speech strategies during structured speech tasks with 70% accuracy, mod cues. Did not target. Speech strategies targeted during conversations with SLP, min cues required for slow rate and over articulation. Other areas targeted: N/A N/A   Education:    Edu provided re: med management, life alert Edu provided re: speech strategies, OME's    Safety Devices: [x] Call light within reach  [x] Chair alarm activated  [] Bed alarm activated  [] Other: [x] Call light within reach  [x] Chair alarm activated  [] Bed alarm activated  [] Other:    Assessment: Tx session 1: Pt pleasant and cooperative, agreeable to participate. Discussed med management, finance management, safety situations and use of life alert when pt returns home.  Pt agreeable to life alert and completing drivers evaluation (case manager to provide pt information about both of these). SLP and pt discussed a daily routine/system that pt could implement at home to recall taking daily medications and brother is available to assist with finances if needed. Tx session 2: Pt pleasant and cooperative, agreeable to participate. Pt tolerated NMES placement 4a at 9.5mA for 20 mins, completing OME's x30 each, without any adverse reactions or side effects. Pt required occasional min cues for slow rate for improved speech intelligibility. Pt continues to make good progress towards all goals within POC and remains motivated to improve. Continue goals above. Plan: Continue as per plan of care. Additional Information:     Barriers toward progress: None  Discharge recommendations:  [] Home independently  [x] Home with assistance []  24 hour supervision  [] ECF [] Other:  Continued Tx Upon Discharge: ? [x] Yes [] No [] TBD based on progress while on ARU [] Vital Stim indicated [] Other:   Estimated discharge date: 07/22/22    Interventions used this date:  [] Speech/Language Treatment  [] Instruction in HEP [] Group [] Dysphagia Treatment [x] Cognitive Treatment   [] Other: Total Time Breakdown / Charges    Time in Time out Total Time / units   Cognitive Tx 0800 0830 30 min/ 2 units    Speech Tx 0900 0930 30 min/ 1 unit    Dysphagia Tx -- -- --     Electronically Signed by     Jamal Jerez. A CCC-SLP  Speech-Language Pathologist  [id number]

## 2022-07-21 LAB
ANION GAP SERPL CALCULATED.3IONS-SCNC: 10 MMOL/L (ref 3–16)
BASOPHILS ABSOLUTE: 0 K/UL (ref 0–0.2)
BASOPHILS RELATIVE PERCENT: 0.5 %
BUN BLDV-MCNC: 13 MG/DL (ref 7–20)
CALCIUM SERPL-MCNC: 10.3 MG/DL (ref 8.3–10.6)
CHLORIDE BLD-SCNC: 100 MMOL/L (ref 99–110)
CO2: 28 MMOL/L (ref 21–32)
CREAT SERPL-MCNC: 0.7 MG/DL (ref 0.8–1.3)
EOSINOPHILS ABSOLUTE: 0.1 K/UL (ref 0–0.6)
EOSINOPHILS RELATIVE PERCENT: 1.8 %
GFR AFRICAN AMERICAN: >60
GFR NON-AFRICAN AMERICAN: >60
GLUCOSE BLD-MCNC: 161 MG/DL (ref 70–99)
HCT VFR BLD CALC: 43.4 % (ref 40.5–52.5)
HEMOGLOBIN: 15.1 G/DL (ref 13.5–17.5)
LYMPHOCYTES ABSOLUTE: 1.5 K/UL (ref 1–5.1)
LYMPHOCYTES RELATIVE PERCENT: 29.1 %
MCH RBC QN AUTO: 36.2 PG (ref 26–34)
MCHC RBC AUTO-ENTMCNC: 34.7 G/DL (ref 31–36)
MCV RBC AUTO: 104 FL (ref 80–100)
MONOCYTES ABSOLUTE: 0.7 K/UL (ref 0–1.3)
MONOCYTES RELATIVE PERCENT: 13.1 %
NEUTROPHILS ABSOLUTE: 2.9 K/UL (ref 1.7–7.7)
NEUTROPHILS RELATIVE PERCENT: 55.5 %
PDW BLD-RTO: 14 % (ref 12.4–15.4)
PLATELET # BLD: 260 K/UL (ref 135–450)
PMV BLD AUTO: 8 FL (ref 5–10.5)
POTASSIUM REFLEX MAGNESIUM: 3.9 MMOL/L (ref 3.5–5.1)
RBC # BLD: 4.17 M/UL (ref 4.2–5.9)
SODIUM BLD-SCNC: 138 MMOL/L (ref 136–145)
WBC # BLD: 5.2 K/UL (ref 4–11)

## 2022-07-21 PROCEDURE — 80048 BASIC METABOLIC PNL TOTAL CA: CPT

## 2022-07-21 PROCEDURE — 97116 GAIT TRAINING THERAPY: CPT

## 2022-07-21 PROCEDURE — 97535 SELF CARE MNGMENT TRAINING: CPT

## 2022-07-21 PROCEDURE — 1280000000 HC REHAB R&B

## 2022-07-21 PROCEDURE — 6370000000 HC RX 637 (ALT 250 FOR IP): Performed by: STUDENT IN AN ORGANIZED HEALTH CARE EDUCATION/TRAINING PROGRAM

## 2022-07-21 PROCEDURE — 92507 TX SP LANG VOICE COMM INDIV: CPT

## 2022-07-21 PROCEDURE — 97112 NEUROMUSCULAR REEDUCATION: CPT

## 2022-07-21 PROCEDURE — 97530 THERAPEUTIC ACTIVITIES: CPT

## 2022-07-21 PROCEDURE — 6360000002 HC RX W HCPCS: Performed by: STUDENT IN AN ORGANIZED HEALTH CARE EDUCATION/TRAINING PROGRAM

## 2022-07-21 PROCEDURE — 36415 COLL VENOUS BLD VENIPUNCTURE: CPT

## 2022-07-21 PROCEDURE — 85025 COMPLETE CBC W/AUTO DIFF WBC: CPT

## 2022-07-21 PROCEDURE — 97129 THER IVNTJ 1ST 15 MIN: CPT

## 2022-07-21 RX ORDER — ASPIRIN 81 MG/1
81 TABLET, CHEWABLE ORAL DAILY
Qty: 30 TABLET | Refills: 3 | Status: SHIPPED | OUTPATIENT
Start: 2022-07-22

## 2022-07-21 RX ORDER — ATORVASTATIN CALCIUM 10 MG/1
10 TABLET, FILM COATED ORAL DAILY
Qty: 30 TABLET | Refills: 3 | Status: SHIPPED | OUTPATIENT
Start: 2022-07-21

## 2022-07-21 RX ADMIN — ACETAMINOPHEN 650 MG: 325 TABLET ORAL at 22:14

## 2022-07-21 RX ADMIN — TRAZODONE HYDROCHLORIDE 50 MG: 50 TABLET ORAL at 22:15

## 2022-07-21 RX ADMIN — ENOXAPARIN SODIUM 40 MG: 100 INJECTION SUBCUTANEOUS at 09:03

## 2022-07-21 RX ADMIN — ATORVASTATIN CALCIUM 10 MG: 10 TABLET, FILM COATED ORAL at 22:15

## 2022-07-21 RX ADMIN — POLYVINYL ALCOHOL 2 DROP: 14 SOLUTION/ DROPS OPHTHALMIC at 09:17

## 2022-07-21 RX ADMIN — ASPIRIN 81 MG: 81 TABLET, CHEWABLE ORAL at 09:02

## 2022-07-21 ASSESSMENT — PAIN DESCRIPTION - DESCRIPTORS: DESCRIPTORS: ACHING;DISCOMFORT

## 2022-07-21 ASSESSMENT — PAIN DESCRIPTION - ORIENTATION: ORIENTATION: RIGHT

## 2022-07-21 ASSESSMENT — PAIN DESCRIPTION - LOCATION: LOCATION: SHOULDER

## 2022-07-21 ASSESSMENT — PAIN SCALES - GENERAL
PAINLEVEL_OUTOF10: 0
PAINLEVEL_OUTOF10: 2

## 2022-07-21 NOTE — DISCHARGE SUMMARY
Physical Therapy  Discharge Summary    Nydia Colmenares  XPM:[id number]  BJD:3/92/[birth year]147  Treatment Diagnosis: impaired functional mobility  Diagnosis: acute CVA    Restrictions/Precautions  Restrictions/Precautions: Fall Risk, General Precautions           Position Activity Restriction  Other position/activity restrictions: R sided weakness; up with assist     Goals:                  Short Term Goals  Time Frame for Short term goals: 10 days (7/9/22)  Short term goal 1: Pt will perform bed mobility with min A  -7/08: goal met: SBA  Short term goal 2: Pt will perform transfers with min A  -7/08: goal met: CGA-Omero  Short term goal 3: Pt will ambulate 25' with quad cane and mod A  -7/05 Goal met  Short term goal 4: Pt will negotiate 4 steps with HR and mod A  -7/05 Goal met            Long Term Goals  Time Frame for Long term goals : 21 days (7/20/22)  Long term goal 1: Pt will perform bed mobility mod I- GOAL MET  Long term goal 2: Pt will perform transfers with supervision- GOAL MET (7/21)-  Long term goal 3: Pt will ambulate 48' with quad cane and CGA 7/05 update goal: Pt will ambulate 150' with RW and clamshell with supervision: GOAL MET- pt able to ambulate up to 500ft without AD  Long term goal 4: Pt will propel wheelchair 150' mod I; 7/05 discontinue goal  Long term goal 5: Pt will negotiate 4 steps with HR and min A- GOAL MET, pt ascends x12 steps with SBA and HR support    Pt. Met 4/4 short term goals and 5/5 long term goals. Pt. Currently ambulates 500+ feet with supv. and no A/D  Up/down 12 steps with step to pattern and one HR support supv   Up/down curb step with supv  Sit to/from stand with Mod I- supv. Bed mobility with Ind. Recommend DC home with HHPT in order to progress functional mobility and safely progress to complete independence  Pt. Safe to return return home with assistance from family as needed.    Electronically signed by Lucho Tomas on 7/21/2022 at 2:58 PM

## 2022-07-21 NOTE — PROGRESS NOTES
Speech Language Pathology  [facility]: ACUTE REHAB UNIT  SPEECH-LANGUAGE PATHOLOGY      [x] Daily  [] Weekly Care Conference Note  [x] Discharge    Taniya Soto      JOSE:[birth year]  UKS:[id number]  Rehab Dx/Hx: Acute CVA (cerebrovascular accident) ([facility] [address] [I63.9]    Precautions: falls  Home situation: lives with brother, manages own finances, did not take any meds PTA, active   ST Dx: [] Aphasia  [x] Dysarthria  [] Apraxia   [] Oropharyngeal dysphagia [x] Cognitive Impairment  [] Other:   Date of Admit: 6/29/2022  Room #: 0166/0166-01    Current functional status (updated daily):         Pt being seen for : [x] Speech/Language Treatment  [] Dysphagia Treatment [x] Cognitive Treatment  [] Other:  Communication: []WFL  [] Aphasia  [x] Dysarthria  [] Apraxia  [] Pragmatic Impairment [] Non-verbal  [] Hearing Loss  [] Other:   Cognition: [] WFL  [x] Mild  [] Moderate  [] Severe [] Unable to Assess  [] Other:  Memory: [] WFL  [x] Mild  [] Moderate  [] Severe [] Unable to Assess  [] Other:  Behavior: [x] Alert  [x] Cooperative  [x]  Pleasant  [] Confused  [] Agitated  [] Uncooperative  [] Distractible [] Motivated  [] Self-Limiting [] Anxious  [] Other:  Endurance:  [x] Adequate for participation in SLP sessions  [] Reduced overall  [] Lethargic  [] Other:  Safety: [x] No concerns at this time  [] Reduced insight into deficits  []  Reduced safety awareness [] Not following call light procedures  [] Unable to Assess  [] Other:    Current Diet Order:ADULT DIET; Regular  ADULT ORAL NUTRITION SUPPLEMENT; Breakfast, Dinner; Standard High Calorie/High Protein Oral Supplement  Swallowing Precautions: upright for all intake, stay upright for at least 30 mins after intake, small bites/sips, set-up assistance / distant supervision with intake, alternate bites/sips, slow rate of intake, general aspiration precautions and hold PO and contact SLP if s/s of aspiration or worsening respiratory status develop. Date: 7/21/2022      Tx session 1  8424-1242 DISCHARGE SUMMARY   Total Timed Code Min 10    Total Treatment Minutes 60    Individual Treatment Minutes 60    Group Treatment Minutes 0    Co-Treat Minutes 0    Variance/Reason:  0    Pain Denies    Pain Intervention [] RN notified  [] Repositioned  [] Intervention offered and patient declined  [x] N/A  [] Other: [] RN notified  [] Repositioned  [] Intervention offered and patient declined  [] N/A  [] Other:   Subjective     Pt alert and oriented, cooperative and agreeable to participate in therapy. Pt seen sitting upright in bedside chair. Objective:  Goals     Dysphagia Goals:   Goal met 07/14/22. Pt is tolerating IDDSI 7 Regular, IDDSI 0 Thin, meds whole in puree as LRD. Goal met 07/14/22. Pt is tolerating IDDSI 7 Regular, IDDSI 0 Thin, meds whole in puree as LRD. Goal met 07/14/22. Goal met 07/14/22. Goal met 07/12/22. FEES completed. See separate speech therapy note for details. Goal met 07/12/22. Goal met 07/14/22. Goal met 07/14/22. *New goal added 07/01/22*  Goal 5. The pt will complete OME's for improved oral motor function, coordination, strength, and ROM in 10/10 trials, min cues. OME's completed with vital stim (NMES) in place:  -placement 4a at 8.0mA for 40 mins   -pt tolerated without any adverse reactions or side effects  -smile: x40  -pucker: x40  -smile/pucker: x40  -cheek puff hold for 5 secs: x40  -cheek/eye squint: x40  -open wide: x40  -pucker to R side: x40   Goal met. Pt tolerated NMES without any adverse reactions or side effects. Pt able to complete OME's with occasional cues to slow down to improve precise articulator movements. Overall improved ROM, strength, and coordination noted on R side. Recommending pt continue completion of OME's for improved oral motor function.     Speech/Lang/Cog goals:  Short-term Goals  Timeframe for Short-term *goals extended through 07/22/22*    Goal 1: The pt will complete graded recall tasks with 90% accuracy, min-no cues. Did not target. Goal met. Pt able to demonstrate appropriate carryover and use of compensatory strategies for improved recall. Goal 2: The pt will complete executive function tasks (i.e. planning, deductive reasoning, inferential, functional organization tasks) with 90% accuracy no cues. Discussed follow up appts, med management, finance management, life alert, and ongoing ST services. Goal met. Recommending assist with meds and finances by juaner, pt in agreement. Goal 3: The pt will complete high-level problem-solving tasks (*particularly related to safety scenarios) with 95% accuracy, no cues. Did not target today. Goal largely met. Pt continues to present with reduced insight at times negatively impacting safety and independence. Goal 4: The pt will complete articulatory agility tasks with 70% accuracy, mod cues. Tongue twisters\"  -80% acc indep improving to 100% acc given mod cues for use of slow rate and over articulation    Goal met. Pt continues to benefit from cues for use of speech strategies for improved intelligibility, but overall improvement noted with self correction and indep carryover. Goal 5: The pt will effectively use compensatory speech strategies during structured speech tasks with 70% accuracy, mod cues. See goal 4 above. Goal met. Other areas targeted: N/A    Education:    Edu provided re: progress towards goals, speech strategies, ongoing ST services      Safety Devices: [x] Call light within reach  [x] Chair alarm activated  [] Bed alarm activated  [] Other: [] Call light within reach  [] Chair alarm activated  [] Bed alarm activated  [] Other:    Assessment: Tx session: Pt pleasant and cooperative, agreeable to participate. Pt tolerated NMES placement 4a at 8.0mA for 30 mins, completing OME's x40 without any adverse reactions or side effects.  Pt completed tongue twisters with 80% acc indep improving to 100% acc given mod cues for use of slow rate and over articulation. Discussed progress towards goals, ongoing ST services, med/finance management, compensatory strategies. Discharge summary: Pt always pleasant and cooperative, motivated to improve. Pt met all goals within POC. Pt tolerating IDDSI Level 7 regular solids with thin liquids, meds whole with water as LRD. Pt has met all cognitive goals within POC, however reduced insight at times. Pt will require assist with med/finance management. Pt continues to present with mod dysarthria and would benefit from ongoing ST services to target oral motor function. Pt with improved use of speech strategies for improved intelligibility. Ongoing ST services recommended at d/c.    Plan: Continue as per plan of care. Additional Information:     Barriers toward progress: None  Discharge recommendations:  [] Home independently  [x] Home with assistance []  24 hour supervision  [] ECF [] Other:  Continued Tx Upon Discharge: ? [x] Yes [] No [] TBD based on progress while on ARU [] Vital Stim indicated [] Other:   Estimated discharge date: 07/22/22    Interventions used this date:  [] Speech/Language Treatment  [] Instruction in HEP [] Group [] Dysphagia Treatment [x] Cognitive Treatment   [] Other: Total Time Breakdown / Charges    Time in Time out Total Time / units   Cognitive Tx 0950 1000 10 min/ 1 unit    Speech Tx 0900 0950 50 min/ 1 unit    Dysphagia Tx -- -- --     Electronically Signed by     Jenni Mak A CCC-SLP  Speech-Language Pathologist  [id number]

## 2022-07-21 NOTE — PROGRESS NOTES
Occupational Therapy  Facility/Department: [facility]  Rehabilitation Occupational Therapy Daily Treatment Note    Date: 22  Patient Name: Gael Chandler       Room: 6646/7944-79  MRN: [id number]  Account: [de-identified]   : 1960  (63 y.o.) Gender: male          Past Medical History:  has a past medical history of CVA (cerebral vascular accident) ([address] Past Surgical History:   has no past surgical history on file. Restrictions  Restrictions/Precautions: Fall Risk;General Precautions  Other position/activity restrictions: R sided weakness; up with assist    Subjective  Subjective: Pt in bed upon entry, denies pain, agreeable to OT session. /73, HR 82, O2 97%  Restrictions/Precautions: Fall Risk;General Precautions      Objective     Cognition  Overall Cognitive Status: Exceptions  Arousal/Alertness: Appropriate responses to stimuli  Following Commands: Follows multistep commands with repitition  Attention Span: Attends with cues to redirect  Memory: Appears intact  Safety Judgement: Decreased awareness of need for assistance  Problem Solving: Assistance required to identify errors made  Insights: Decreased awareness of deficits  Initiation: Does not require cues  Sequencing: Requires cues for some  Cognition Comment: cues to attend to R side of body  Orientation  Overall Orientation Status: Within Functional Limits         ADL  Feeding  Assistance Level: Set-up  Skilled Clinical Factors: Pt able to open pudding container and drink cover, assistance required to open salt/pepper packages  Grooming/Oral Hygiene  Assistance Level: Supervision  Skilled Clinical Factors: standing at sink for oral hygiene, ability to manage toothpaste without assist  Upper Extremity Bathing  Assistance Level:  Independent  Skilled Clinical Factors: increased time to apply soap onto washcloth  Lower Extremity Bathing  Assistance Level: Supervision  Skilled Clinical Factors: while standing to bathe buttocks/back  Upper notified; Bed alarm in place; Left in bed    Patient Education  Education  Education Given To: Patient  Education Provided: Role of Therapy; ADL Function;Equipment; Mobility Training;Transfer Training;Energy Conservation; Safety;Precautions;Plan of Care;DME/Home Modifications; Home Exercise Program;Visual Perceptual Function; Fall Prevention Strategies  Education Provided Comments: disease specific: ADL katlyn techniques, dynamic vs standing balance, safety with foot placement and clearance, visual scanning, R sided awareness  Education Method: Verbal;Demonstration  Barriers to Learning: Cognition  Education Outcome: Verbalized understanding;Continued education needed    Plan  Plan  Times per Week: 5 of 7 days  Current Treatment Recommendations: Strengthening;Balance training;Functional mobility training; Endurance training;Neuromuscular re-education;Positioning;Equipment evaluation, education, & procurement; Modalities; Patient/Caregiver education & training; Safety education & training;Self-Care / ADL; Home management training;Cognitive/Perceptual training;Coordination training;Sensory integraion    Goals  Patient Goals   Patient goals : \"to get back to normal\"  Short Term Goals  Time Frame for Short term goals: within 7 days by 7/6/22  Short Term Goal 1: Pt will perform toilet transfer with Mod A and LRAD- GOAL MET 7/11/22 Pt completed toilet transfer with CGA. Short Term Goal 2: Pt will perform LB dressing with Min A and AE PRN- GOAL MET 7/13/22 Pt performed LB dressing with min A. Short Term Goal 3: Pt will perform UB dressing with Min A and appropriate katlyn techniques- GOAL MET 7/7  Short Term Goal 4: Pt will perform functional dynamic balance activity in stance for >5 minutes with Min A and LRAD- GOAL MET 7/6  Short Term Goal 5: Pt will perform 2-3 grooming tasks with Min A for during tasks standing vs sitting with LRAD and Min A for balance. GOAL MET 7/11/22 Pt completed grooming tasks with min A.   Long Term Goals  Time Frame for Long term goals : within 21 days by 7/20/22 - cont to d/c date to 7/22  Long Term Goal 1: Pt will perform TB dressing Mod I - 7/21 Pt supervision/setup A for LB dressing  Long Term Goal 2: Pt will perfrom TB bathing Mod I and AE PRN - 7/21 Pt supervision for bathing  Long Term Goal 3: Pt will perform simple IADL task in stance with LRAD for >7 minutes with supervision. GOAL MET 7/15/22 Pt completed IADL task with supervision and stood greater than 7 minutes. Long Term Goal 4: Pt will self feed with R hand with Min A - goal not met, pt can complete meal with set up A with use of L hand, eats less than 50% using R hand  Long Term Goal 5: Pt will increase Block and Box score on R hand by >4 blocks- goal progressing as of assessment 7/7 R hand = 1 block in 60 seconds. GOAL MET 7/14/22 Pt transferred 8 blocks in Box and Blocks assessment.       Therapy Time   Individual Concurrent Group Co-treatment   Time In 1030         Time Out 1200         Minutes 90         Timed Code Treatment Minutes: 90 Minutes       Aayush Rodriguez OTR/L

## 2022-07-21 NOTE — PROGRESS NOTES
Physical Therapy  Facility/Department: [facility]  Rehabilitation Physical Therapy Treatment Note    NAME: Maria Cortés  : 1960 (60 y.o.)  MRN: [id number]  CODE STATUS: Full Code    Date of Service: 22       Restrictions:  Restrictions/Precautions: Fall Risk;General Precautions  Position Activity Restriction  Other position/activity restrictions: R sided weakness; up with assist     SUBJECTIVE  Subjective  Subjective: pt found sitting EOB this afternoon  Pain: pt reports general aches today          OBJECTIVE  Cognition  Overall Cognitive Status: Exceptions  Arousal/Alertness: Appropriate responses to stimuli  Following Commands: Follows multistep commands with repitition  Attention Span: Attends with cues to redirect  Memory: Appears intact  Safety Judgement: Decreased awareness of need for assistance  Problem Solving: Assistance required to identify errors made  Insights: Decreased awareness of deficits  Initiation: Does not require cues  Sequencing: Requires cues for some  Cognition Comment: cues to attend to R side of body  Orientation  Overall Orientation Status: Within Functional Limits  Orientation Level: Oriented to time;Oriented to situation;Oriented to person;Oriented to place    Functional Mobility     Pt found sitting EOB. Vitals assessed. PT performs IRF-MATTHEW assesment for bed mobility, functional transfers, gait, stairs, and picking up object from floor. Pt is grossly Ind for bed mobility, Azael -Supv for transfers, and supv for gait stairs, and picking up object from floor. Pt does show some tendencies for unsafe behavior due to lack of insight and awareness of deficits. -pt completes car transfer w/ supv. Pt initially sits down by throwing leg into car first and demos lack of control. PT cues pt on safe technique to turn and sit first and then move legs into car. Pt voices understanding   -x200ft gait w/o AD and supv.  Pt has mild limping gait w/o bracing and demos excessive knee extension in stance on R LE.    -pt able to squat to floor and  bean bag from floor w/ Mod I-supv. Pt moves quickly and PT cues to go slow and be sure that he is steady before making any dynamic movements such as squatting to  and object. PT dons dorsiflexion, knee flexion, and hip flexion assist wrapping to R LE.   X400ft gait with supv. PT notes that pt has improved gait mechanics and pt voices significant improvement in comfort with mobility. Pt is able to take more symmetrical steps with lack of limping and excessive hyperextension in stance on the R. Dyn standing exercise with SBA: pt holding 6lb therapy ball in B UE and 3lb ankle weights donned. pt cued to squat down and tap ball onto 6in step, stand up tall, step up onto box with alternating feet and repeat. Pt completes x12 repetitions. Continued struggle to hold onto ball with R UE but pt shows good stability and control throughout. Dynamic gait activity with SBA: pt cued to walk across room, 20ft, and grab cones off of shelf at shoulder height w/ R  UE. Pt then walks back to start point, squat down, and place cones on the floor in upright position. Pt then repeats by picking up cones off of ground and onto shelf. Pt continues to struggle with grasp and reaching with R UE but shows increased ability to navigate narrow spaces, squat, and stand up with no assist from PT. Pt completes for x6 cones    X200 ft gait back to room with supv. Pt left sitting EOB with all needs within reach and call alarm in place. ASSESSMENT/PROGRESS TOWARDS GOALS  Vital Signs  Heart Rate: 80  Heart Rate Source: Monitor  BP: 124/80  BP Location: Left upper arm  MAP (Calculated): 94.67  SpO2: 94 %  O2 Device: None (Room air)    Assessment  Assessment: pt seen this afternoon for DC assessment and continued intervention to address deficits in mobiltiy and dynamic balance.  pt continues to prefer having theraband assisted knee flexion wrap donned to R LE. PT uir wrap following DC assessment. pt grossly supv. for gait up to 400ft aw/o AD and trasnsfers w/o AD. pts gait is significantly improved when knee flexion wrap is donned due to reduced hyperextension and limping during gait. PT rec DC home with HHPT vs OPPT  Activity Tolerance: Patient tolerated treatment well  Discharge Recommendations: 24 hour supervision or assist;Patient would benefit from continued therapy after discharge  PT Equipment Recommendations  Equipment Needed: No    Goals  Patient Goals   Patient goals : \"to be able to walk again and use my R arm\"  Short Term Goals  Time Frame for Short term goals: 10 days (7/9/22)  Short term goal 1: Pt will perform bed mobility with min A  -7/08: goal met: SBA  Short term goal 2: Pt will perform transfers with min A  -7/08: goal met: CGA-Omero  Short term goal 3: Pt will ambulate 25' with quad cane and mod A  -7/05 Goal met  Short term goal 4: Pt will negotiate 4 steps with HR and mod A  -7/05 Goal met  Long Term Goals  Time Frame for Long term goals : 21 days (7/20/22)  Long term goal 1: Pt will perform bed mobility mod I- GOAL MET  Long term goal 2: Pt will perform transfers with supervision- GOAL MET (7/21)-  Long term goal 3: Pt will ambulate 48' with quad cane and CGA 7/05 update goal: Pt will ambulate 150' with RW and clamshell with supervision: GOAL MET- pt able to ambulate up to 500ft without AD  Long term goal 4: Pt will propel wheelchair 150' mod I; 7/05 discontinue goal  Long term goal 5: Pt will negotiate 4 steps with HR and min A- GOAL MET, pt ascends x12 steps with SBA and HR support    PLAN OF CARE/SAFETY  Plan  Plan:  minutes of therapy at least 5 out of 7 days a week  Plan weeks: 21 days  Specific Instructions for Next Treatment: R katlyn strength training and continue mobility progression  Current Treatment Recommendations: Strengthening;ROM;Balance training;Functional mobility training;Transfer training; Endurance training; Wheelchair mobility training;Gait training;Stair training;Neuromuscular re-education;Manual Therapy - Soft Tissue Mobilization;Patient/Caregiver education & training; Safety education & training;Home exercise program;Equipment evaluation, education, & procurement; Modalities; Therapeutic activities;ADL/Self-care training;IADL training  Safety Devices  Type of Devices: All fall risk precautions in place;Call light within reach; Chair alarm in place;Gait belt;Nurse notified; Left in chair;Patient at risk for falls  Restraints  Restraints Initially in Place: No    EDUCATION  Education  Education Given To: Patient  Education Provided: Role of Therapy; Safety;Equipment; Mobility Training; Fall Prevention Strategies;Transfer Training;Plan of Care;Precautions; Home Exercise Program  Education Provided Comments: educated pt on fall prevention strategies, no carryover  Education Method: Demonstration;Verbal  Barriers to Learning: Cognition  Education Outcome: Continued education needed        Therapy Time   Individual Concurrent Group Co-treatment   Time In 1230         Time Out 1330         Minutes 60           Timed Code Treatment Minutes: [id number] [facility], [address] [place] [address], 07/21/22 at 2:42 PM

## 2022-07-21 NOTE — PROGRESS NOTES
Occupational Therapy  Discharge Summary     Sadie Tafoya  HAV:[id number]  OBS:[birth year]  Treatment Diagnosis: impaired functional mobility  Diagnosis: acute CVA    Restrictions/Precautions  Restrictions/Precautions: Fall Risk, General Precautions           Position Activity Restriction  Other position/activity restrictions: R sided weakness; up with assist     Goals:   Patient Goals   Patient goals : \"to get back to normal\"  Short Term Goals  Time Frame for Short term goals: within 7 days by 7/6/22  Short Term Goal 1: Pt will perform toilet transfer with Mod A and LRAD- GOAL MET 7/11/22 Pt completed toilet transfer with CGA. Short Term Goal 2: Pt will perform LB dressing with Min A and AE PRN- GOAL MET 7/13/22 Pt performed LB dressing with min A. Short Term Goal 3: Pt will perform UB dressing with Min A and appropriate katlyn techniques- GOAL MET 7/7  Short Term Goal 4: Pt will perform functional dynamic balance activity in stance for >5 minutes with Min A and LRAD- GOAL MET 7/6  Short Term Goal 5: Pt will perform 2-3 grooming tasks with Min A for during tasks standing vs sitting with LRAD and Min A for balance. GOAL MET 7/11/22 Pt completed grooming tasks with min A. Long Term Goals  Time Frame for Long term goals : within 21 days by 7/20/22 - cont to d/c date to 7/22  Long Term Goal 1: Pt will perform TB dressing Mod I - goal not met 7/21 Pt supervision/setup A for LB dressing  Long Term Goal 2: Pt will perfrom TB bathing Mod I and AE PRN - goal not met 7/21 Pt supervision for bathing  Long Term Goal 3: Pt will perform simple IADL task in stance with LRAD for >7 minutes with supervision. GOAL MET 7/15/22 Pt completed IADL task with supervision and stood greater than 7 minutes.   Long Term Goal 4: Pt will self feed with R hand with Min A - goal not met 7/21 pt currently eats 50% of meal using R hand, is set up A with use of L hand  Long Term Goal 5: Pt will increase Block and Box score on R hand by >4 blocks- goal progressing as of assessment 7/7 R hand = 1 block in 60 seconds. GOAL MET 7/14/22 Pt transferred 8 blocks in Box and Blocks assessment. Pt. Met 5/5 short term goals and 2/5 long term goals. Pt will d/c home with assist from family and continued skilled OT services to facilitated return to PLOF and independence in ADLs. ADL:  Feeding  Assistance Level: Set-up  Skilled Clinical Factors: Pt able to open pudding container and drink cover, assistance required to open salt/pepper packages  Grooming/Oral Hygiene  Assistance Level: Supervision  Skilled Clinical Factors: standing at sink for oral hygiene, ability to manage toothpaste without assist  Upper Extremity Bathing  Assistance Level: Independent  Skilled Clinical Factors: increased time to apply soap onto washcloth  Lower Extremity Bathing  Assistance Level: Supervision  Skilled Clinical Factors: while standing to bathe buttocks/back  Upper Extremity Dressing  Assistance Level: Independent  Skilled Clinical Factors: while seated  Lower Extremity Dressing  Assistance Level: Supervision;Set-up  Skilled Clinical Factors: SPV to pull over hips, pant ties done beforehand  Putting On/Taking Off Footwear  Assistance Level: Independent  Skilled Clinical Factors: required increased time  Toileting  Assistance Level: Supervision;Set-up  Skilled Clinical Factors: to tie drawstring on pants after toileting  Toilet Transfers  Equipment: Standard toilet;Grab bars  Assistance Level: Stand by assist  Tub/Shower Transfers  Type: Shower  Transfer To: Tub transfer bench  Additional Factors: With handrails  Assistance Level: Supervision          Functional Mobility  Device:  (no AD)  Activity: To/From therapy gym; To/From bathroom; Retrieve items;Transport items  Assistance Level: Stand by assist  Bed Mobility  Overall Assistance Level: Modified Independent  Transfers  Surface: To chair with arms;From chair with arms;From bed; To mat; To bed;Standard toilet  Device:  (no AD)  Stand to Sit  Assistance Level: Supervision  Skilled Clinical Factors: cues for safety   OT Exercises  Motor Control/Coordination: Gross grasp Jenga block stacking, 5 stacks of 4-7 blocks each       Assessment:   Assessment  Assessment: Pt agreeable to OT session. Pt required supervision A during shower. Pt completed UB dressing independently, LB dressing with setup/supervision, with assistance to tie pant fasteners, independent in footwear donning/doffing. Pt completed grasping/dynamic standing activity to stack Jenga blocks in 7 towers with 4-7 blocks each; pt stood for 15 mins during activity. Recommend d/c home with 24hr assist and HHOT S1. Activity Tolerance: Patient tolerated treatment well  Discharge Recommendations: 24 hour supervision or assist;S Level 1  Safety Devices  Safety Devices in place: Yes  Type of devices: All fall risk precautions in place; Patient at risk for falls;Gait belt;Nurse notified; Bed alarm in place; Left in bed    Equipment Recommendations:  N/a         Signs and Symptoms of Delirium (from CAM)  A. Acute Onset Mental Status Change  Is there evidence of an acute change in mental status from the patient's baseline? [x] 0. No [] 1. Yes    B. Inattention: Did the patient have difficulty focusing attention, for example being easily distractible or having difficulty keeping track of what was being said?  [] 0. Behavior not present    [] 1. Behavior continuously present, does not fluctuate   [x] 2. Behavior present, fluctuates (comes and goes, changes in severity)    C. Disorganized thinking: Was the patient's thinking disorganized or incoherent (rambling or irrelevant conversation, unclear or illogical flow of ideas, or unpredictable switching from subject to subject)? [x] 0. Behavior not present    [] 1. Behavior continuously present, does not fluctuate   [] 2. Behavior present, fluctuates (comes and goes, changes in severity)    D.  Altered level of consciousness: Did the patient have altered level of consciousness as indicated by any of the following criteria? Vigilant: startled easily to any sound or touch  Lethargic: repeatedly dozed off when being asked questions, but responded to voice or touch  Stuporous: very difficult to arouse and keep aroused for the interview  Comatose: could not be aroused  [x] 0. Behavior not present    [] 1. Behavior continuously present, does not fluctuate   [] 2.  Behavior present, fluctuates (comes and goes, changes in severity)      Electronically signed by Luis Hilton OT on 7/21/2022 at 2:52 PM

## 2022-07-22 VITALS
TEMPERATURE: 97.7 F | OXYGEN SATURATION: 95 % | WEIGHT: 146.6 LBS | DIASTOLIC BLOOD PRESSURE: 68 MMHG | SYSTOLIC BLOOD PRESSURE: 99 MMHG | BODY MASS INDEX: 18.82 KG/M2 | RESPIRATION RATE: 18 BRPM | HEIGHT: 74 IN | HEART RATE: 82 BPM

## 2022-07-22 PROCEDURE — 6360000002 HC RX W HCPCS: Performed by: STUDENT IN AN ORGANIZED HEALTH CARE EDUCATION/TRAINING PROGRAM

## 2022-07-22 PROCEDURE — 6370000000 HC RX 637 (ALT 250 FOR IP): Performed by: STUDENT IN AN ORGANIZED HEALTH CARE EDUCATION/TRAINING PROGRAM

## 2022-07-22 RX ADMIN — POLYVINYL ALCOHOL 2 DROP: 14 SOLUTION/ DROPS OPHTHALMIC at 10:28

## 2022-07-22 RX ADMIN — ASPIRIN 81 MG: 81 TABLET, CHEWABLE ORAL at 10:27

## 2022-07-22 RX ADMIN — ENOXAPARIN SODIUM 40 MG: 100 INJECTION SUBCUTANEOUS at 10:27

## 2022-07-22 ASSESSMENT — PAIN SCALES - GENERAL
PAINLEVEL_OUTOF10: 0

## 2022-07-22 NOTE — CARE COORDINATION
Case Management Discharge Summary  [facility] - Acute Rehab Unit    [facility]     Rehab Dx/Hx: Acute CVA (cerebrovascular accident) [facility]) [I63.9]       Today's Date: 7/22/2022    Discharge to:  Home with home care   Pre-certification completed:  [x] No       []      [] N/A   Hospital Exemption Notification (HENS) completed:  [x] No       []      [] N/A   IMM given:  N/A       New Durable Medical Equipment ordered/agency:  DME script sent to [place] PCP office    Transportation:  Family to p/u pt       Confirmed discharge plan with:  Patient: [] No       [x] yes  Family:  [] No       [x]  yes  Name:Ramon Chaparro (Brother/Sister)   Contact number: [phone]    Facility/Agency, name:  [address] [place] [address] [address] [facility] [address] [facility]   [phone]:  INDIO/AVS faxed    RN, name: Kofi Trejo RN       Has lack of transportation kept you from medical appointments, meetings, work, or ADL's? [] Yes, it has kept me from medical appointments or from getting my meds  [] Yes, It has kept me from non-medical meetings, appointments, work, or getting things I need  [x] No  [] Pt unable to respond  [] Pt declines to respond     How often do you need to have someone help you when you read instructions, pamphlets, or other written material from your doctor or pharmacy? [] Never                             [] Always  [x] Rarely                            [] Patient declines to respond  [] Sometimes                    [] Patient unable to respond  [] Often       Note: Discharging nurse to complete INDIO, reconcile AVS, and place final copy with patient's discharge packet. RN to ensure that written prescriptions for  Level II medications are sent with patient to the facility as per protocol. All prescriptions, DME and Home care orders have been faxed to [place] PCP.     Signature:  Alvaro Rowland RN

## 2022-07-22 NOTE — PROGRESS NOTES
Discharge: Pt discharged to home as per order. Scripts sent to [place] by case management plus instructions given. Pt verbalized understanding. Denied questions.

## 2022-07-22 NOTE — PROGRESS NOTES
ACUTE REHAB UNIT: [facility] [address] [place] [address]     Rehab Dx/Hx: Acute CVA (cerebrovascular accident) [facility]) [I63.9]   GEL:[birth year]  MARY:[id number]  Date of Admit: 6/29/2022   Date of Discharge: 7/22/2022    Subjective:   Order for patient discharge. Reviewed discharge summary (AVS) with patient and _______ including medications, physical instructions (safety) and diet. Pt in stable condition. Reconciled Medication List - Patient:   Medications were reviewed by RN at time of discharge with patient and/or family:  []  Via EMR   []  Via health information exchange  []  Verbal (e.g. in person, telephone, video conferencing)  []  Paper-based (e.g. fax, copies, printouts)   []  Other Methods (e.g. texting, email, CDs)    Reconciled Medication List - Subsequent provider:   [] No, current reconciled medication list not provided to the subsequent provider.  [] Yes, current reconciled medication list provided to the subsequent provider. [] Via EMR    [] Via health information exchange  [] Verbal (e.g. in person, telephone, video conferencing)  [] Paper-based (e.g. fax, copies, printouts)   [] Other Methods (e.g. texting, email, CDs)    Discharge disposition:  Pt was discharged via:  []  Wheelchair  []  Stretcher  []  Safe ambulation and use of assistive device  []  Other:     Pt was discharged to:  []  Private car  []  Transportation service to next destination  []  Other:     Discharge destination:  []  Home  []  [facility]  []  [facility] [address]  []  Other:        Discharge BIMS:  Number of word repeated after first attempt:  []  0. None []  1. One []  2. Two [x]  3. Three    Able to report correct year:  []  0. Missed by >5 years, or no answer  []  1. Missed by 2-5 years  []  2. Missed by 1 year  [x]  3. Correct    Able to report correct month:   []  0. Missed by >1 month, or no answer  []  1. Missed by 6 days to 1 month  [x]  2.  Accurate within 5 days    Able to report correct day of the week:  []  0. Incorrect or no answer  [x]  1. Correct    Recall:   Able to recall \"sock\":  []  0. No could not recall  []  1. Yes, after cueing  [x]  2. Yes, no cue required  Able to recall \"blue\":  []  0. No could not recall  []  1. Yes, after cueing  [x]  2. Yes, no cue required  Able to recall \"bed\":  []  0. No could not recall  [x]  1. Yes, after cueing  []  2. Yes, no cue required      Patient Mood Interview    Symptom Presence  0. No (enter 0 in column 2)  1. Yes (enter 0-3 in column 2)  9. No response (leave column 2 blank  Symptom Frequency  0. Never or 1 day  1. 2-6 days (several days)  2. 7-11 days (half or more days)  3. 12-14 days (nearly everyday) 1. Symptom Presence 2. Symptom Frequency    Enter scores in boxes   Little interest or pleasure in doing things   0    Feeling down, depressed, or hopeless   0    If either A or B is coded 2 or 3, CONTINUE asking the questions below. If not, END the interview. Trouble falling or staying asleep, or sleeping too much   0    Feeling tired or having little energy   0    Poor appetite or overeating   0    Feeling bad about yourself - or that you are a failure or have let yourself or your family down   0    Trouble concentrating on things, such as reading the newspaper or watching television   0    Moving or speaking so slowly that other people could have noticed. Or the opposite- being so fidgety or restless that you have been moving around a lot more than usual.   0    Thoughts that you would be better off dead, or of hurting yourself in some way.   0    Total Severity: Add scores for all frequency responses in column 2       Social isolation (from [employer])  How often do you feel lonely or isolated from those around you? [x] 0. Never  [] 1. Rarely  [] 2. Sometimes  [] 3. Often  [] 4. Always  [] 8. Patient unable to respond.            Discharging Nurse Signature:  Kristy Calabrese RN

## 2022-07-22 NOTE — PROGRESS NOTES
IRF MATTHEW Discharge [address] Acute Rehab Unit    [facility]     Rehab Dx/Hx: Acute CVA (cerebrovascular accident) [facility]) [I63.9]   :1960  [facility]:[id number]  Date of Admit: 2022     Pain Effect on Sleep:  Over the past 5 days, how much of the time has pain made it hard for you to sleep at night? [x]  0. Does not apply - I have not had any pain or hurting in the past 5 days  []  1. Rarely or not at all  []  2. Occasionally  []  3. Frequently  []  4. Almost constantly  []  8. Unable to answer    Over the past 5 days, how often have you limited your participation in rehabilitation therapy sessions due to pain? [x]  0. Does not apply - I have not received rehabilitation therapy in the past 5 days  []  1. Rarely or not at all  []  2. Occasionally  []  3. Frequently  []  4. Almost constantly  []  8. Unable to answer    Pain Interference with Day-to-Day Activities:  Over the past 5 days, how often have you limited your day-to-day activities (excluding rehabilitation therapy session)? [x]  1. Rarely or not at all  []  2. Occasionally  []  3. Frequently  []  4. Almost constantly  []  8. Unable to answer      High-Risk Drug Classes: Use and Indication   Is taking: Check if the pt is taking any medications by pharmacological classification  Indication noted: If column 1 is checked, check if there is an indication noted for all meds in the drug class Is taking  (check all that apply) Indication noted (check all that apply)   Antipsychotic [] []   Anticoagulant [] []   Antibiotic [] []   Opioid [] []   Antiplatelet [] []   Hypoglycemic (including insulin) [] []   None of the above [x] [x]     Special Treatments, Procedures, and Programs   Check all of the following treatments, procedures, and programs that apply on admission. On admission (check all that apply)   Cancer Treatments    A1. Chemotherapy []   A2. IV []   A3. Oral []   A10.  Other []   B1. Radiation []   Respiratory Therapies C1. Oxygen Therapy []   C2. Continuous []   C3. Intermittent []   C4. High-concentration []   D1. Suctioning []   D2. Scheduled []   D3. As needed []   E1. Tracheostomy Care []   F1. Invasive Mechanical Ventilator (ventilator or respirator) []   G1. Non-invasive Mechanical Ventilator []   G2. BiPAP []   G3. CPAP []   Other    H1. IV Medications []   H2. Vasoactive medications []   H3. Antibiotics []   H4. Anticoagulation []   H10. Other []   I1. Transfusions []   J1. Dialysis []   J2. Hemodialysis []   J3. Peritoneal dialysis []   O1. IV access []   O2. Peripheral []   O3. Midline []   O4.  Central (PICC, tunneled, port) []   None of the above [x]     Signature:

## 2022-07-22 NOTE — PROGRESS NOTES
Madison Rojo  7/21/2022  [id number]    Chief Complaint: Acute CVA (cerebrovascular accident) [facility])    Subjective:   No acute events overnight. Today Issa Garcia is seen in his room. He reports that he is feeling well and denies acute complaints. He reports that he has an appointment at the [place] on Monday. He is looking forward to discharge home tomorrow. ROS: denies f/c, n/v, cp, sob    Objective:  Patient Vitals for the past 24 hrs:   BP Temp Temp src Pulse Resp SpO2 Weight   07/21/22 1435 124/80 -- -- 80 -- 94 % --   07/21/22 0830 103/66 97.1 °F (36.2 °C) Oral 87 16 93 % --   07/21/22 0445 -- -- -- -- -- -- 146 lb 9.6 oz (66.5 kg)     Gen: No distress, pleasant. HEENT: Normocephalic, atraumatic. CV: extremities well perfused  Resp: no respiratory distress  Abd: Soft, nontender   Ext: No edema. Neuro: Alert, oriented, appropriately interactive. Improved right hemiparesis, dysarthria. Wt Readings from Last 3 Encounters:   07/21/22 146 lb 9.6 oz (66.5 kg)   02/14/22 145 lb (65.8 kg)       Laboratory data:   Lab Results   Component Value Date    WBC 5.2 07/21/2022    HGB 15.1 07/21/2022    HCT 43.4 07/21/2022    .0 (H) 07/21/2022     07/21/2022       Lab Results   Component Value Date/Time     07/21/2022 08:36 AM    K 3.9 07/21/2022 08:36 AM     07/21/2022 08:36 AM    CO2 28 07/21/2022 08:36 AM    BUN 13 07/21/2022 08:36 AM    CREATININE 0.7 07/21/2022 08:36 AM    GLUCOSE 161 07/21/2022 08:36 AM    CALCIUM 10.3 07/21/2022 08:36 AM        Therapy progress:  PT  Position Activity Restriction  Other position/activity restrictions: R sided weakness; up with assist  Objective     Sit to Stand:  Moderate Assistance  Stand to sit: Moderate Assistance  Bed to Chair: Moderate assistance (to L with cues for safety)  Device: Parallel Bars  Assistance: Minimal assistance  Distance: 8' and 20'  OT  PT Equipment Recommendations  Equipment Needed: No  Toilet - Technique: Ambulating  Equipment Used: Standard toilet (with L grab bar)  Toilet Transfers Comments: cues for safe hand placement; decreased proprioception when going to initate sit down to toilet   Assessment        SLP  Current Diet : Soft and Bite-Sized             Body mass index is 18.82 kg/m². Assessment and Plan:  Trisha Lagunas is a 60year old male with a past medical history significant for left sided stroke due to ICA dissection (2018) without residual deficits, HLD, and nicotine dependence who presented to  on 6/26/22 with acute onset of right face, arm, and leg weakness, found to have acute CVA. He was admitted to [facility] on 6/29/22 due to functional deficits below his baseline. Acute CVA  - acute infarct involving the left corona radiata and posterior left putamen  - etiology suspected to be small vessel  - s/p tPA  - with right hemiparesis, dysarthria, dysphagia  - secondary stroke prevention with asa and statin  - follow up in stroke clinic outpatient  - diet has been upgraded to regular  - PT, OT, ST     Hyperbilirubinemia  - suspected due to [place] Syndrome  - follow up with PCP     Nicotine Dependence  - quit smoking 2 years ago but still wearing patches  - continue nicotine patch    Dry eyes  - eye drops    Right foot callus   - monitor     Bowels: Schedule stool softener. Follow bowel movements. Enema or suppository if needed. Bladder: Check PVR x 3. [address] if PVR > 200ml or if any volume is > 500 ml. Sleep: Trazodone provided prn. Follow up appointments: PCP, Stroke clinic    Services: home health PT, OT, ST  EDOD: 7/22/22    Kristin Wood.  Sharlene Alvarez MD 7/21/2022, 8:47 PM

## 2022-07-25 NOTE — H&P
Physical Medicine & Rehabilitation  Discharge Summary     Patient Identification:  Sarika Keenan  : 1960  Admit date: 2022  Discharge date: 2022   Attending provider: Lidia Rodrigez. Tammi Raphael MD       Primary care provider: Katarina Santillan     Discharge Diagnoses:   Patient Active Problem List   Diagnosis    Acute CVA (cerebrovascular accident) ([facility] [address]    Moderate malnutrition ([facility] [address]       History of Present Illness/Acute Hospital Course:  Sarika Keenan is a 60year old male with a past medical history significant for left sided stroke due to ICA dissection () without residual deficits, HLD, and nicotine dependence who presented to  on 22 with acute onset of right face, arm, and leg weakness. Initial NIH was 9. CT head was negative for acute process. He was given tPA. MRI brain revealed acute infarct involving the left corona radiata and posterior left putamen. His hospital course was complicated by hyperbilirubinemia. He was admitted to [facility] on 22 due to functional deficits below his baseline. Inpatient Rehabilitation Course:   Sarika Keenan is a 60 y.o. male admitted to inpatient rehabilitation on 2022 with Acute CVA (cerebrovascular accident) ([facility] [address] The patient participated in an aggressive multidisciplinary inpatient rehabilitation program involving 3 hours of therapy per day, at least 5 days per week. He made good progress with regard to ADLs, transfers, gait, speech. Now overall stand by assist to modified independent. Discharging home with family. Home care services and DME ordered as below. Patient will follow-up with PCP and Neurology.      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 Respirations non-labored. CV: No murmurs noted. RRR. Abd: Soft, nontender. NABS+  Ext: No edema, no varicosities  Neuro: Alert, fully oriented, appropriate   MSK: No joint abnormalities noted     Discharge Functional Status:    Physical therapy:  Bed Mobility:  Overall Assistance Level: Stand By Assist  Sit>supine:  Assistance Level: Stand by assist  Supine>sit:  Assistance Level: Stand by assist  Skilled Clinical Factors: Pt assisted from quadruped to R sidelying with mod(A)x2 then assisted from supine to sitting EOM with mod(A)x1 after rest break  Transfers:  Surface: From chair with arms, To chair with arms  Additional Factors: Verbal cues, Hand placement cues (verbal cues to turn all the way around before trying to sit)  Device:  (none this session)  Sit>stand:  Assistance Level: Contact guard assist  Skilled Clinical Factors: cues for hand placement and RLE placement and safety of overall technique  Stand>sit:  Assistance Level: Contact guard assist  Skilled Clinical Factors: cues for hand placement and sequencing; poor eccentric control. pt req Omero from w/ t/f from softer surfaces (chair, bed), but CGA for firmer surfaces.   Bed<>chair  Technique: Stand pivot  Assistance Level: Contact guard assist  Stand Pivot:     Lateral transfer:     Car transfer:  Assistance Level: Contact guard assist  Skilled Clinical Factors: pt requires cues for safe technique  Ambulation:  Surface: Level surface  Device: Rolling walker (rw w/ R clamshell UE support)  Distance: 150' x 2  Activity: Within Unit  Activity Comments: Short ambulation walks w/o AD  of 10' or less several times during treatment w/ cga/min A  Assistance Level: Contact guard assist, Minimal assistance  Gait Deviations: Decreased step length right, Decreased heel strike right, Decreased weight shift bilateral  Skilled Clinical Factors: improved steadiness using rw vs NAD  Stairs:  Stair Height: 6''  Device: One handrail  Number of Stairs: 12  Additional Factors: Verbal cues, Hand placement cues, Non-reciprocal going up, Non-reciprocal going down, Increased time to complete  Assistance Level: Contact guard assist, Minimal assistance  Skilled Clinical Factors: cues for safety and sequencing stairs  Skilled Clinical Factors - Comments: increased time and effort to bring RLE onto step  Curb: Wheelchair:  Surface: Level surface  Device: Standard wheelchair  Additional Factors: Set-up, Verbal cues, Hand placement cues, Increased time to complete  Assistance Level for Propulsion: Stand by assist  Propulsion Method: Left lower extremity, Right lower extremity  Propulsion Quality: Slow velocity, Decreased fluidity  Propulsion Distance: 15 ft from parallel bars to stairs  Skilled Clinical Factors: Pt was able to complete brake managements on w/c with verbal cues and hand over hand initially to manage right break progressing to SBA  Assessment:  Assessment: pt seen this afternoon for DC assessment and continued intervention to address deficits in mobiltiy and dynamic balance. pt continues to prefer having theraband assisted knee flexion wrap donned to R LE. PT uri wrap following DC assessment. pt grossly supv. for gait up to 400ft aw/o AD and trasnsfers w/o AD. pts gait is significantly improved when knee flexion wrap is donned due to reduced hyperextension and limping during gait.  PT rec DC home with HHPT vs OPPT  Activity Tolerance: Patient tolerated treatment well  Discharge Recommendations: 24 hour supervision or assist, Patient would benefit from continued therapy after discharge      Occupational therapy:   Feeding  Assistance Level: Set-up  Skilled Clinical Factors: Pt able to open pudding container and drink cover, assistance required to open salt/pepper packages  Grooming/Oral Hygiene  Assistance Level: Supervision  Skilled Clinical Factors: standing at sink for oral hygiene, ability to manage toothpaste without assist  UE Bathing  Assistance Level: Independent  Skilled Clinical Factors: increased time to apply soap onto washcloth  LE Bathing  Assistance Level: Supervision  Skilled Clinical Factors: while standing to bathe buttocks/back  UE Dressing  Assistance Level: Independent  Skilled Clinical Factors: while seated  LE Dressing  Assistance Level: Supervision, Set-up  Skilled Clinical Factors: SPV to pull over hips, pant ties done beforehand  Putting On/Taking Off Footwear  Equipment Provided: Sock aid  Assistance Level: Independent  Skilled Clinical Factors: required increased time  Toileting  Assistance Level: Supervision, Set-up  Skilled Clinical Factors: to tie drawstring on pants after toileting  Transfers: Toilet Transfers  Technique: Stand step  Equipment: Standard toilet, Grab bars  Additional Factors: With handrails  Assistance Level: Stand by assist  Tub/Shower Transfers  Type: Shower  Transfer From: Standing without device  Transfer To: Tub transfer bench  Additional Factors: With handrails  Assistance Level: Supervision  Skilled Clinical Factors: min A to step over tub, Pt states he will be showering at mother's house in walk-in shower until he is stronger. IADLs:  Meal Prep  Meal Prep Level: Other  Meal Prep Level of Assistance:  Moderate assistance  Meal Preparation: assist for scooping and mixing due to difficulty with RUE, SBA for balance in kitchen, good safety with hot surfaces; fair sequencing  Money Management     Light Housekeeping  Light Housekeeping Level: Other  Light Housekeeping Level of Assistance: Supervision  Light Housekeeping: standing at sink to wash dishes, fair use of RUE to grasp objects while LUE washed objects  [address] [facility] [employer]     Assessment:  Assessment: Pt agreeable to OT session. Pt required supervision A during shower. Pt completed UB dressing independently, LB dressing with setup/supervision, with assistance to tie pant fasteners, independent in footwear donning/doffing. Pt completed grasping/dynamic standing activity to stack Jenga blocks in 7 towers with 4-7 blocks each; pt stood for 15 mins during activity. Recommend d/c home with 24hr assist and HHOT S1. Activity Tolerance: Patient tolerated treatment well  Discharge Recommendations: 24 hour supervision or assist, S Level 1    Speech therapy:    Speech Therapy Diagnosis  Cognitive Diagnosis: Mild cognitive deficit / reduced insight into deficits, poor safety awareness  Speech Diagnosis: Moderate-severe dysarthria         Significant Diagnostics:   Lab Results   Component Value Date    CREATININE 0.7 (L) 07/21/2022    BUN 13 07/21/2022     07/21/2022    K 3.9 07/21/2022     07/21/2022    CO2 28 07/21/2022       Lab Results   Component Value Date    WBC 5.2 07/21/2022    HGB 15.1 07/21/2022    HCT 43.4 07/21/2022    .0 (H) 07/21/2022     07/21/2022       Disposition:  home    Services: home health PT, OT, ST, nursing  DME: Shower chair, and RW with clam shell    Discharge Condition: Stable    Follow-up:  See after visit summary from hospitalization    Discharge Medications:     Medication List        START taking these medications      aspirin 81 MG chewable tablet  Take 1 tablet by mouth in the morning.   Replaces: aspirin 325 MG tablet            CONTINUE taking these medications      atorvastatin 10 MG tablet  Commonly known as: LIPITOR  Take 1 tablet by mouth in the morning. STOP taking these medications      aspirin 325 MG tablet  Replaced by: aspirin 81 MG chewable tablet               Where to Get Your Medications        You can get these medications from any pharmacy    Bring a paper prescription for each of these medications  aspirin 81 MG chewable tablet  atorvastatin 10 MG tablet           I spent over 35 minutes on this discharge encounter between counseling, coordination of care, and medication reconciliation. To comply with [id number] [facility] R[facility]:   Discharge order placed in advance to facilitate patients discharge needs.       Kunal Perera MD

## 2022-07-28 NOTE — DISCHARGE SUMMARY
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 Respirations non-labored. CV: No murmurs noted. RRR. Abd: Soft, nontender. NABS+  Ext: No edema, no varicosities  Neuro: Alert, fully oriented, appropriate  MSK: No joint abnormalities noted    Discharge Functional Status:    Physical therapy:  Bed Mobility:  Overall Assistance Level: Stand By Assist  Sit>supine:  Assistance Level: Stand by assist  Supine>sit:  Assistance Level: Stand by assist  Skilled Clinical Factors: Pt assisted from quadruped to R sidelying with mod(A)x2 then assisted from supine to sitting EOM with mod(A)x1 after rest break  Transfers:  Surface: From chair with arms, To chair with arms  Additional Factors: Verbal cues, Hand placement cues (verbal cues to turn all the way around before trying to sit)  Device:  (none this session)  Sit>stand:  Assistance Level: Contact guard assist  Skilled Clinical Factors: cues for hand placement and RLE placement and safety of overall technique  Stand>sit:  Assistance Level: Contact guard assist  Skilled Clinical Factors: cues for hand placement and sequencing; poor eccentric control. pt req Omero from w/ t/f from softer surfaces (chair, bed), but CGA for firmer surfaces.   Bed<>chair  Technique: Stand pivot  Assistance Level: Contact guard assist  Stand Pivot:     Lateral transfer:     Car transfer:  Assistance Level: Contact guard assist  Skilled Clinical Factors: pt requires cues for safe technique  Ambulation:  Surface: Level surface  Device: Housekeeping Level: Other  Light Housekeeping Level of Assistance: Supervision  Light Housekeeping: standing at sink to wash dishes, fair use of RUE to grasp objects while LUE washed objects  [address] [facility]     Assessment:  Assessment: Pt agreeable to OT session. Pt required supervision A during shower. Pt completed UB dressing independently, LB dressing with setup/supervision, with assistance to tie pant fasteners, independent in footwear donning/doffing. Pt completed grasping/dynamic standing activity to stack Jenga blocks in 7 towers with 4-7 blocks each; pt stood for 15 mins during activity. Recommend d/c home with 24hr assist and HHOT S1. Activity Tolerance: Patient tolerated treatment well  Discharge Recommendations: 24 hour supervision or assist, S Level 1    Speech therapy:    Speech Therapy Diagnosis  Cognitive Diagnosis: Mild cognitive deficit / reduced insight into deficits, poor safety awareness  Speech Diagnosis: Moderate-severe dysarthria         Significant Diagnostics:   Lab Results   Component Value Date    CREATININE 0.7 (L) 07/21/2022    BUN 13 07/21/2022     07/21/2022    K 3.9 07/21/2022     07/21/2022    CO2 28 07/21/2022       Lab Results   Component Value Date    WBC 5.2 07/21/2022    HGB 15.1 07/21/2022    HCT 43.4 07/21/2022    .0 (H) 07/21/2022     07/21/2022       Disposition:  home     Services: home health PT, OT, ST, nursing  DME: Shower chair, and RW with clam shell    Discharge Condition: Stable    Follow-up:  See after visit summary from hospitalization    Discharge Medications:     Medication List        START taking these medications      aspirin 81 MG chewable tablet  Take 1 tablet by mouth in the morning. Replaces: aspirin 325 MG tablet            CONTINUE taking these medications      atorvastatin 10 MG tablet  Commonly known as: LIPITOR  Take 1 tablet by mouth in the morning.             STOP taking these medications      aspirin 325 MG tablet  Replaced by: aspirin 81 MG chewable tablet               Where to Get Your Medications        You can get these medications from any pharmacy    Bring a paper prescription for each of these medications  aspirin 81 MG chewable tablet  atorvastatin 10 MG tablet           I spent over 35 minutes on this discharge encounter between counseling, coordination of care, and medication reconciliation. To comply with Gertrudis WEINBERGII.[id number]:   Discharge order placed in advance to facilitate patients discharge needs.       Akiko Posada MD